# Patient Record
Sex: MALE | Race: WHITE | NOT HISPANIC OR LATINO | Employment: OTHER | URBAN - METROPOLITAN AREA
[De-identification: names, ages, dates, MRNs, and addresses within clinical notes are randomized per-mention and may not be internally consistent; named-entity substitution may affect disease eponyms.]

---

## 2017-02-16 ENCOUNTER — OFFICE VISIT - RIVER FALLS (OUTPATIENT)
Dept: FAMILY MEDICINE | Facility: CLINIC | Age: 72
End: 2017-02-16
Payer: MEDICARE

## 2017-02-16 ASSESSMENT — MIFFLIN-ST. JEOR: SCORE: 1486.96

## 2017-02-23 ENCOUNTER — OFFICE VISIT (OUTPATIENT)
Dept: DERMATOLOGY | Facility: CLINIC | Age: 72
End: 2017-02-23

## 2017-02-23 VITALS — HEART RATE: 63 BPM | DIASTOLIC BLOOD PRESSURE: 89 MMHG | SYSTOLIC BLOOD PRESSURE: 142 MMHG

## 2017-02-23 DIAGNOSIS — L11.1 GROVER'S DISEASE: ICD-10-CM

## 2017-02-23 DIAGNOSIS — L08.9 BACTERIAL INFECTION OF SKIN: Primary | ICD-10-CM

## 2017-02-23 DIAGNOSIS — D48.5 NEOPLASM OF UNCERTAIN BEHAVIOR OF SKIN: ICD-10-CM

## 2017-02-23 DIAGNOSIS — B96.89 BACTERIAL INFECTION OF SKIN: Primary | ICD-10-CM

## 2017-02-23 DIAGNOSIS — R21 RASH: ICD-10-CM

## 2017-02-23 PROCEDURE — 87070 CULTURE OTHR SPECIMN AEROBIC: CPT | Performed by: DERMATOLOGY

## 2017-02-23 PROCEDURE — 87077 CULTURE AEROBIC IDENTIFY: CPT | Performed by: DERMATOLOGY

## 2017-02-23 PROCEDURE — 88305 TISSUE EXAM BY PATHOLOGIST: CPT | Performed by: DERMATOLOGY

## 2017-02-23 ASSESSMENT — PAIN SCALES - GENERAL
PAINLEVEL: NO PAIN (0)
PAINLEVEL: NO PAIN (0)

## 2017-02-23 NOTE — LETTER
"2/23/2017       RE: Gaurang Saeed  [address] [place] [id number]     Dear Colleague,    Thank you for referring your patient, Gaurang Saeed, to the [facility] DERMATOLOGY at [facility]. Please see a copy of my visit note below.                [facility] Dermatology Note    Dermatology Problem List:  1. Erythematous plaques on bilateral malar cheeks, with overlying yellow crust present on L cheek. Differential favors contact dermatitis with possible secondary bacterial infection vs photosensitive drug reaction from HCTZ and recent travel to [place] vs. Granuloma faciale   -aerobic bacterial culture perform on L cheek  -currently treatment: TAC 0.1% ointment daily  -previous treatment: prednisone 50 mg daily for 3 days    2. Violaceous macules and papules with some containing an overlying scale present on abdomen and lower back. Differential Freddy's disease vs. Guttate psoriasis vs other  -s/p punch bx (2/23/17) on R lower back    3. BCC R upper lip  -s/p Mohs 2013    4. Actinic keratoses- face and ears  -s/p cryotherapy   -bx 5/20/16 of R antitragus consistent with an AK    Encounter Date: Feb 23, 2017    CC:  Chief Complaint   Patient presents with     Derm Problem     Patient comes to clinic today for a skin exam. States concern with some \"things\" on his face.     History of Present Illness:  Mr. Gaurang Saeed is a 71 year old male who presents as a follow-up for a regular skin check. The patient was last seen by Dr. Lopez 5/20/16 when a biopsy was perform of his R antitragus that was consistent with an AK. At his last visit, multiple AKs on the face were treated with cryotherapy.  Since his last visit, Mr. Saeed states that he had a flu shot in late December, and shortly after receiving the vaccination, he developed a rash all over his abdomen and back. The rash was initially pruritic, but symptoms have improved over time.  He has not treated the rash, but " states that the rash has not resolved over the past 2 months.      Two weeks ago, Mr. Saeed developed red bumps on both of his cheeks.  The lesions are very pruritic, and one night he was scratching the one on his L cheek which then started oozing serous fluid.  Last week, he was seen in  urgent care for the lesions of his cheek, and was placed on prednisone 50 mg for 5 days. Ms. Saeed stopped taking the medication after 3 days due to poor sleep while on the medication, but does note improvement of the red bumps while taking prednisone.  Within the last month, Mr. Saeed was in [place] visiting one of his children, but denies any changes in products used on face or any changes in medication could have precipitated this rash.  He denies any changes in medication or past medical history within the past 6 months.      Past Medical History:   Patient Active Problem List   Diagnosis     Neoplasm of uncertain behavior of skin     BCC (basal cell carcinoma), face     Bacterial infection of skin     Past Medical History   Diagnosis Date     Basal cell carcinoma      Squamous cell carcinoma (H)      Past Surgical History   Procedure Laterality Date     No history of surgery       Mohs micrographic procedure       Social History:  The patient is a retired .  He lives in [place].     Family History:  There is no family history of melanoma.    Medications:  Current Outpatient Prescriptions   Medication Sig Dispense Refill     lisinopril (PRINIVIL,ZESTRIL) 20 MG tablet Take 20 mg by mouth daily.       hydrochlorothiazide 12.5 MG TABS Take 25 mg by mouth daily.       No Known Allergies    Review of Systems:  -Constitutional: The patient denies fatigue, fevers, chills, unintended weight loss, and night sweats.  -HEENT: Patient denies nonhealing oral sores.  -Skin: As above in HPI. No additional skin concerns.    Physical exam:  Vitals: /89 (BP Location: Right arm, Patient Position: Chair, Cuff Size: Adult Regular)   Pulse 63  GEN: This is a well developed, well-nourished male in no acute distress, in a pleasant mood.    SKIN: Total skin excluding the undergarment areas was performed. The exam included the head/face, neck, both arms, chest, back, abdomen, both legs, digits and/or nails.   -erythematous plaques on bilateral malar cheeks with overlying yellow crust present on L cheek  -Violaceous macules and papules with some containing an overlying scale present on abdomen and lower back  -erythematous gritty papules present along bilateral forehead and temples.    -No other lesions of concern on areas examined.     Impression/Plan:  1. Pruritic, erythematous plaques on bilateral malar cheeks with overlying yellow crust present on L cheek. Differential diagnosis difficult to determine given partially treated with prednisone but includes: Allergic contact dermatitis with a secondary bacterial infection vs. Photosensitive drug reaction (Ms. Saeed takes HCTZ for HTN and was recently in [place]) vs. Granuloma faciale.     Apply TAC 0.1% cream to cheeks daily for a couple of weeks    Aerobic bacterial culture obtained from L cheek    Photographs taken for future reference.    2. Violaceous macules and papules with some containing an overlying scale present on abdomen and lower back. Differential Freddy's disease vs. Guttate psoriasis vs other    Punch biopsy(performed by faculty with medical student assistance): After discussion of benefits and risks including but not limited to bleeding, infection, scar, incomplete removal, recurrence, and non-diagnostic biopsy, written consent and photographs were obtained. Time-out was performed. The area was cleaned with isopropyl alcohol. 2 mL of 1% lidocaine with epinephrine was injected to obtain adequate anesthesia of the lesion on the R lower back. A 4 mm punch biopsy was performed.  4-0 prolene sutures were utilized to approximate the epidermal edges.  White petroleum jelly/VaselineTM and a  bandage was applied to the wound.  Explicit verbal and written wound care instructions were provided.  The patient left the Dermatology Clinic in good condition.     Photographs taken for future reference    3. Actinic keratoses on bilateral forehead and temples. Will not treat today given erythematous plaques on bilateral cheeks. Plan to re-evaluate at his next visit.    4. History of BCC on R upper lip, no evidence of recurrence present on examination today.    Sun precaution was advised including the use of sun screens of SPF 30 or higher, sun protective clothing, and avoidance of tanning beds.      Follow-up in 2 weeks, earlier for new or changing lesions.     Staff Involved:  Scriewbed by Evelia Eugene, MS4 for Dr. Cortez.      I agree with the PFSH and ROS as completed by the Medical Student. The remainder of the encounter was performed by me and scribed by the Medical Student. The scribed note accurately reflects my personal services and the medical decisions made by me. Skin biopsy procedure was done together as noted above.    Mariza Cortez MD  Professor and Chair  Department of Dermatology  [facility]

## 2017-02-23 NOTE — PATIENT INSTRUCTIONS
Wound Care After a Biopsy    What is a skin biopsy?  A skin biopsy allows the doctor to examine a very small piece of tissue under the microscope to determine the diagnosis and the best treatment for the skin condition. A local anesthetic (numbing medicine)  is injected with a very small needle into the skin area to be tested. A small piece of skin is taken from the area. Sometimes a suture (stitch) is used.     What are the risks of a skin biopsy?  I will experience scar, bleeding, swelling, pain, crusting and redness. I may experience incomplete removal or recurrence. Risks of this procedure are excessive bleeding, bruising, infection, nerve damage, numbness, thick (hypertrophic or keloidal) scar and non-diagnostic biopsy.    How should I care for my wound for the first 24 hours?    Keep the wound dry and covered for 24 hours    If it bleeds, hold direct pressure on the area for 15 minutes. If bleeding does not stop then go to the emergency room    Avoid strenuous exercise the first 1-2 days or as your doctor instructs you    How should I care for the wound after 24 hours?    After 24 hours, remove the bandage    You may bathe or shower as normal    If you had a scalp biopsy, you can shampoo as usual and can use shower water to clean the biopsy site daily    Clean the wound twice a day with gentle soap and water    Do not scrub, be gentle    Apply white petroleum/Vaseline after cleaning the wound with a cotton swab or a clean finger, and keep the site covered with a Bandaid /bandage. Bandages are not necessary with a scalp biopsy    If you are unable to cover the site with a Bandaid /bandage, re-apply ointment 2-3 times a day to keep the site moist. Moisture will help with healing    Avoid strenuous activity for first 1-2 days    Avoid lakes, rivers, pools, and oceans until the stitches are removed or the site is healed    How do I clean my wound?    Wash hands for 15 minutes with soap or use hand  before  all wound care    Clean the wound with gentle soap and water    Apply white petroleum/Vaseline  to wound after it is clean    Replace the Bandaid /bandage to keep the wound covered for the first few days or as instructed by your doctor    If you had a scalp biopsy, warm shower water to the area on a daily basis should suffice    What should I use to clean my wound?     Cotton-tipped applicators (Qtips )    White petroleum jelly (Vaseline ). Use a clean new container and use Q-tips to apply.    Bandaids   as needed    Gentle soap     How should I care for my wound long term?    Do not get your wound dirty    Keep up with wound care for one week or until the area is healed.    A small scab will form and fall off by itself when the area is completely healed. The area will be red and will become pink in color as it heals. Sun protection is very important for how your scar will turn out. Sunscreen with an SPF 30 or greater is recommended once the area is healed.    If you have stitches, stitches need to be removed in 14 days. You may return to our clinic for this or you may have it done locally at your doctor s office.    You should have some soreness but it should be mild and slowly go away over several days. Talk to your doctor about using tylenol for pain,    When should I call my doctor?  If you have increased:     Pain or swelling    Pus or drainage (clear or slightly yellow drainage is ok)    Temperature over 100F    Spreading redness or warmth around wound    When will I hear about my results?  The biopsy results can take 2-3 weeks to come back. The clinic will call you with the results, send you a Figgu message, or have you schedule a follow-up clinic or phone time to discuss the results. Contact our clinics if you do not hear from us in 3 weeks.     Who should I call with questions?    [facility]: [phone]     [facility]: [phone]    For  urgent needs outside of business hours call the [facility] at [phone] and ask for the dermatology resident on call

## 2017-02-23 NOTE — MR AVS SNAPSHOT
After Visit Summary   2/23/2017    Gaurang Saeed    MRN: [id number]           Patient Information     Date Of Birth          1945        Visit Information        Provider Department      2/23/2017 3:15 PM Mariza Cortez MD [facility] Dermatology        Today's Diagnoses     Bacterial infection of skin    -  1    Rash          Care Instructions    Wound Care After a Biopsy    What is a skin biopsy?  A skin biopsy allows the doctor to examine a very small piece of tissue under the microscope to determine the diagnosis and the best treatment for the skin condition. A local anesthetic (numbing medicine)  is injected with a very small needle into the skin area to be tested. A small piece of skin is taken from the area. Sometimes a suture (stitch) is used.     What are the risks of a skin biopsy?  I will experience scar, bleeding, swelling, pain, crusting and redness. I may experience incomplete removal or recurrence. Risks of this procedure are excessive bleeding, bruising, infection, nerve damage, numbness, thick (hypertrophic or keloidal) scar and non-diagnostic biopsy.    How should I care for my wound for the first 24 hours?    Keep the wound dry and covered for 24 hours    If it bleeds, hold direct pressure on the area for 15 minutes. If bleeding does not stop then go to the emergency room    Avoid strenuous exercise the first 1-2 days or as your doctor instructs you    How should I care for the wound after 24 hours?    After 24 hours, remove the bandage    You may bathe or shower as normal    If you had a scalp biopsy, you can shampoo as usual and can use shower water to clean the biopsy site daily    Clean the wound twice a day with gentle soap and water    Do not scrub, be gentle    Apply white petroleum/Vaseline after cleaning the wound with a cotton swab or a clean finger, and keep the site covered with a Bandaid /bandage. Bandages are not necessary with a scalp biopsy    If you are  unable to cover the site with a Bandaid /bandage, re-apply ointment 2-3 times a day to keep the site moist. Moisture will help with healing    Avoid strenuous activity for first 1-2 days    Avoid lakes, rivers, pools, and oceans until the stitches are removed or the site is healed    How do I clean my wound?    Wash hands for 15 minutes with soap or use hand  before all wound care    Clean the wound with gentle soap and water    Apply white petroleum/Vaseline  to wound after it is clean    Replace the Bandaid /bandage to keep the wound covered for the first few days or as instructed by your doctor    If you had a scalp biopsy, warm shower water to the area on a daily basis should suffice    What should I use to clean my wound?     Cotton-tipped applicators (Qtips )    White petroleum jelly (Vaseline ). Use a clean new container and use Q-tips to apply.    Bandaids   as needed    Gentle soap     How should I care for my wound long term?    Do not get your wound dirty    Keep up with wound care for one week or until the area is healed.    A small scab will form and fall off by itself when the area is completely healed. The area will be red and will become pink in color as it heals. Sun protection is very important for how your scar will turn out. Sunscreen with an SPF 30 or greater is recommended once the area is healed.    If you have stitches, stitches need to be removed in 14 days. You may return to our clinic for this or you may have it done locally at your doctor s office.    You should have some soreness but it should be mild and slowly go away over several days. Talk to your doctor about using tylenol for pain,    When should I call my doctor?  If you have increased:     Pain or swelling    Pus or drainage (clear or slightly yellow drainage is ok)    Temperature over 100F    Spreading redness or warmth around wound    When will I hear about my results?  The biopsy results can take 2-3 weeks to come  back. The clinic will call you with the results, send you a [employer]AGORAt message, or have you schedule a follow-up clinic or phone time to discuss the results. Contact our clinics if you do not hear from us in 3 weeks.     Who should I call with questions?    [facility]: [phone]     [facility]: [phone]    For urgent needs outside of business hours call the [facility] at [phone] and ask for the dermatology resident on call          Follow-ups after your visit        Your next 10 appointments already scheduled     Mar 07, 2017  1:30 PM CST   (Arrive by 1:15 PM)   Return Visit with Mariza Cortez MD   [facility] Dermatology ([facility])    [address] [place] [id number] [phone]              Who to contact     Please call your clinic at [phone] to:    Ask questions about your health    Make or cancel appointments    Discuss your medicines    Learn about your test results    Speak to your doctor   If you have compliments or concerns about an experience at your clinic, or if you wish to file a complaint, please contact [facility] Patient Relations at [phone] or email us at [email or web address]         Additional Information About Your Visit        [employer] Information     [employer] is an electronic gateway that provides easy, online access to your medical records. With [employer], you can request a clinic appointment, read your test results, renew a prescription or communicate with your care team.     To sign up for addwisht visit the website at [email or web address]   You will be asked to enter the access code listed below, as well as some personal information. Please follow the directions to create your username and password.     Your access code is: [email or web address]  Expires: 2017  6:30 AM     Your access code will   in 90 days. If you need help or a new code, please contact your [facility] or call [phone] for assistance.        Care EveryWhere ID     This is your Care EveryWhere ID. This could be used by other organizations to access your [facility] medical records  [id number]        Your Vitals Were     Pulse                   63            Blood Pressure from Last 3 Encounters:   02/23/17 142/89   01/15/13 (!) 149/92    Weight from Last 3 Encounters:   No data found for Wt              We Performed the Following     BIOPSY SKIN/SUBQ/MUC MEM, SINGLE LESION     Skin Culture Aerobic Bacterial     Surgical pathology exam        Primary Care Provider Office Phone # Fax #    Eddie De La Torre [phone]       [facility] [address] [facility] [address]        Thank you!     Thank you for choosing [facility] DERMATOLOGY  for your care. Our goal is always to provide you with excellent care. Hearing back from our patients is one way we can continue to improve our services. Please take a few minutes to complete the written survey that you may receive in the mail after your visit with us. Thank you!             Your Updated Medication List - Protect others around you: Learn how to safely use, store and throw away your medicines at www.disposemymeds.org.          This list is accurate as of: 2/23/17  4:25 PM.  Always use your most recent med list.                   Brand Name Dispense Instructions for use    hydrochlorothiazide 12.5 MG Tabs tablet      Take 25 mg by mouth daily.       lisinopril 20 MG tablet    PRINIVIL/ZESTRIL     Take 20 mg by mouth daily.

## 2017-02-24 RX ORDER — TRIAMCINOLONE ACETONIDE 1 MG/G
OINTMENT TOPICAL
Qty: 80 G | Refills: 1 | Status: SHIPPED | OUTPATIENT
Start: 2017-02-24 | End: 2022-06-21

## 2017-02-24 NOTE — PROGRESS NOTES
"[facility] Dermatology Note    Dermatology Problem List:  1. Erythematous plaques on bilateral malar cheeks, with overlying yellow crust present on L cheek. Differential favors contact dermatitis with possible secondary bacterial infection vs photosensitive drug reaction from HCTZ and recent travel to [place] vs. Granuloma faciale   -aerobic bacterial culture perform on L cheek  -currently treatment: TAC 0.1% ointment daily  -previous treatment: prednisone 50 mg daily for 3 days    2. Violaceous macules and papules with some containing an overlying scale present on abdomen and lower back. Differential Sumerco's disease vs. Guttate psoriasis vs other  -s/p punch bx (2/23/17) on R lower back    3. BCC R upper lip  -s/p Mohs 2013    4. Actinic keratoses- face and ears  -s/p cryotherapy   -bx 5/20/16 of R antitragus consistent with an AK    Encounter Date: Feb 23, 2017    CC:  Chief Complaint   Patient presents with     Derm Problem     Patient comes to clinic today for a skin exam. States concern with some \"things\" on his face.     History of Present Illness:  Mr. Gaurang Saeed is a 71 year old male who presents as a follow-up for a regular skin check. The patient was last seen by Dr. Lopez 5/20/16 when a biopsy was perform of his R antitragus that was consistent with an AK. At his last visit, multiple AKs on the face were treated with cryotherapy.  Since his last visit, Mr. Saeed states that he had a flu shot in late December, and shortly after receiving the vaccination, he developed a rash all over his abdomen and back. The rash was initially pruritic, but symptoms have improved over time.  He has not treated the rash, but states that the rash has not resolved over the past 2 months.      Two weeks ago, Mr. Saeed developed red bumps on both of his cheeks.  The lesions are very pruritic, and one night he was scratching the one on his L cheek which then started oozing serous fluid.  Last week, he was seen " in  urgent care for the lesions of his cheek, and was placed on prednisone 50 mg for 5 days. Ms. Saeed stopped taking the medication after 3 days due to poor sleep while on the medication, but does note improvement of the red bumps while taking prednisone.  Within the last month, Mr. Saeed was in [place] visiting one of his children, but denies any changes in products used on face or any changes in medication could have precipitated this rash.  He denies any changes in medication or past medical history within the past 6 months.      Past Medical History:   Patient Active Problem List   Diagnosis     Neoplasm of uncertain behavior of skin     BCC (basal cell carcinoma), face     Bacterial infection of skin     Past Medical History   Diagnosis Date     Basal cell carcinoma      Squamous cell carcinoma (H)      Past Surgical History   Procedure Laterality Date     No history of surgery       Mohs micrographic procedure       Social History:  The patient is a retired .  He lives in [place].     Family History:  There is no family history of melanoma.    Medications:  Current Outpatient Prescriptions   Medication Sig Dispense Refill     lisinopril (PRINIVIL,ZESTRIL) 20 MG tablet Take 20 mg by mouth daily.       hydrochlorothiazide 12.5 MG TABS Take 25 mg by mouth daily.       No Known Allergies    Review of Systems:  -Constitutional: The patient denies fatigue, fevers, chills, unintended weight loss, and night sweats.  -HEENT: Patient denies nonhealing oral sores.  -Skin: As above in HPI. No additional skin concerns.    Physical exam:  Vitals: /89 (BP Location: Right arm, Patient Position: Chair, Cuff Size: Adult Regular)  Pulse 63  GEN: This is a well developed, well-nourished male in no acute distress, in a pleasant mood.    SKIN: Total skin excluding the undergarment areas was performed. The exam included the head/face, neck, both arms, chest, back, abdomen, both legs, digits and/or nails.    -erythematous plaques on bilateral malar cheeks with overlying yellow crust present on L cheek  -Violaceous macules and papules with some containing an overlying scale present on abdomen and lower back  -erythematous gritty papules present along bilateral forehead and temples.    -No other lesions of concern on areas examined.     Impression/Plan:  1. Pruritic, erythematous plaques on bilateral malar cheeks with overlying yellow crust present on L cheek. Differential diagnosis difficult to determine given partially treated with prednisone but includes: Allergic contact dermatitis with a secondary bacterial infection vs. Photosensitive drug reaction (Ms. Saeed takes HCTZ for HTN and was recently in [place]) vs. Granuloma faciale.     Apply TAC 0.1% cream to cheeks daily for a couple of weeks    Aerobic bacterial culture obtained from L cheek    Photographs taken for future reference.    2. Violaceous macules and papules with some containing an overlying scale present on abdomen and lower back. Differential Freddy's disease vs. Guttate psoriasis vs other    Punch biopsy(performed by faculty with medical student assistance): After discussion of benefits and risks including but not limited to bleeding, infection, scar, incomplete removal, recurrence, and non-diagnostic biopsy, written consent and photographs were obtained. Time-out was performed. The area was cleaned with isopropyl alcohol. 2 mL of 1% lidocaine with epinephrine was injected to obtain adequate anesthesia of the lesion on the R lower back. A 4 mm punch biopsy was performed.  4-0 prolene sutures were utilized to approximate the epidermal edges.  White petroleum jelly/VaselineTM and a bandage was applied to the wound.  Explicit verbal and written wound care instructions were provided.  The patient left the Dermatology Clinic in good condition.     Photographs taken for future reference    3. Actinic keratoses on bilateral forehead and temples. Will not  treat today given erythematous plaques on bilateral cheeks. Plan to re-evaluate at his next visit.    4. History of BCC on R upper lip, no evidence of recurrence present on examination today.    Sun precaution was advised including the use of sun screens of SPF 30 or higher, sun protective clothing, and avoidance of tanning beds.      Follow-up in 2 weeks, earlier for new or changing lesions.     Staff Involved:  Scriewbed by Evelia Eugene, MS4 for Dr. Cortez.      I agree with the PFSH and ROS as completed by the Medical Student. The remainder of the encounter was performed by me and scribed by the Medical Student. The scribed note accurately reflects my personal services and the medical decisions made by me. Skin biopsy procedure was done together as noted above.    Mariza Cortez MD  Professor and Chair  Department of Dermatology  [facility]      Mariza Cortez MD  Professor and Chair  Department of Dermatology  [facility]

## 2017-02-25 LAB
BACTERIA SPEC CULT: ABNORMAL
MICRO REPORT STATUS: ABNORMAL
SPECIMEN SOURCE: ABNORMAL

## 2017-02-27 LAB — COPATH REPORT: NORMAL

## 2017-03-01 DIAGNOSIS — L11.1 GROVER'S DISEASE: Primary | ICD-10-CM

## 2017-03-01 RX ORDER — TRIAMCINOLONE ACETONIDE 5 MG/G
CREAM TOPICAL 2 TIMES DAILY
Qty: 60 G | Refills: 1 | Status: SHIPPED | OUTPATIENT
Start: 2017-03-01 | End: 2022-06-21

## 2017-03-01 NOTE — PROGRESS NOTES
Biopsy report suggests Hermleigh's - recommend a trial for 3-4 weeks with 0.1% TMC cream and a follow-up visit after this.

## 2017-03-14 ENCOUNTER — TELEPHONE (OUTPATIENT)
Dept: DERMATOLOGY | Facility: CLINIC | Age: 72
End: 2017-03-14

## 2017-03-14 NOTE — TELEPHONE ENCOUNTER
Called pt regarding his June appointment. Pt states he has chosen a different provider to handle his care.

## 2017-03-14 NOTE — TELEPHONE ENCOUNTER
----- Message from Mariza Cortez MD sent at 3/12/2017  9:44 AM CDT -----  Regarding: needs to be seen  - not sure what is going on with his appointments or the letter that was sent to him  Very confusing notes in his chart.     He needs to be seen if at all possible  - not sure what happened to the appointment he had scheduled and why the one in his chart is way out in June.     Emma, please call him and ask him about his facial rash and how it is doing. He should not be using the strong topical steroid we prescribed for this long period of time.     Please connect with me in clinic Monday on this situation.    Regards,  Mariza Cortez MD

## 2017-04-04 ENCOUNTER — OFFICE VISIT - RIVER FALLS (OUTPATIENT)
Dept: FAMILY MEDICINE | Facility: CLINIC | Age: 72
End: 2017-04-04
Payer: MEDICARE

## 2017-04-06 ENCOUNTER — OFFICE VISIT - RIVER FALLS (OUTPATIENT)
Dept: FAMILY MEDICINE | Facility: CLINIC | Age: 72
End: 2017-04-06
Payer: MEDICARE

## 2017-04-06 ASSESSMENT — MIFFLIN-ST. JEOR: SCORE: 1468.82

## 2018-04-24 ENCOUNTER — AMBULATORY - RIVER FALLS (OUTPATIENT)
Dept: FAMILY MEDICINE | Facility: CLINIC | Age: 73
End: 2018-04-24
Payer: MEDICARE

## 2018-04-25 LAB
CHOLEST SERPL-MCNC: 220 MG/DL
CHOLEST/HDLC SERPL: 2.5 {RATIO}
CREAT SERPL-MCNC: 1.04 MG/DL (ref 0.7–1.18)
GLUCOSE BLD-MCNC: 93 MG/DL (ref 65–99)
HDLC SERPL-MCNC: 88 MG/DL
LDLC SERPL CALC-MCNC: 116 MG/DL
NONHDLC SERPL-MCNC: 132 MG/DL
PSA SERPL-MCNC: 0.8 NG/ML
TRIGL SERPL-MCNC: 64 MG/DL

## 2018-04-26 ENCOUNTER — OFFICE VISIT - RIVER FALLS (OUTPATIENT)
Dept: FAMILY MEDICINE | Facility: CLINIC | Age: 73
End: 2018-04-26
Payer: MEDICARE

## 2018-04-26 ASSESSMENT — MIFFLIN-ST. JEOR: SCORE: 1459.74

## 2019-04-22 ENCOUNTER — AMBULATORY - RIVER FALLS (OUTPATIENT)
Dept: FAMILY MEDICINE | Facility: CLINIC | Age: 74
End: 2019-04-22
Payer: MEDICARE

## 2019-04-23 LAB
BUN SERPL-MCNC: 16 MG/DL (ref 7–25)
BUN/CREAT RATIO - HISTORICAL: NORMAL (ref 6–22)
CALCIUM SERPL-MCNC: 9.2 MG/DL (ref 8.6–10.3)
CHLORIDE BLD-SCNC: 105 MMOL/L (ref 98–110)
CHOLEST SERPL-MCNC: 203 MG/DL
CHOLEST/HDLC SERPL: 2.4 {RATIO}
CO2 SERPL-SCNC: 30 MMOL/L (ref 20–32)
CREAT SERPL-MCNC: 0.9 MG/DL (ref 0.7–1.18)
EGFRCR SERPLBLD CKD-EPI 2021: 84 ML/MIN/1.73M2
GLUCOSE BLD-MCNC: 91 MG/DL (ref 65–99)
HDLC SERPL-MCNC: 86 MG/DL
LDLC SERPL CALC-MCNC: 96 MG/DL
NONHDLC SERPL-MCNC: 117 MG/DL
POTASSIUM BLD-SCNC: 4.3 MMOL/L (ref 3.5–5.3)
PSA SERPL-MCNC: 0.9 NG/ML
SODIUM SERPL-SCNC: 141 MMOL/L (ref 135–146)
TRIGL SERPL-MCNC: 110 MG/DL

## 2019-04-29 ENCOUNTER — OFFICE VISIT - RIVER FALLS (OUTPATIENT)
Dept: FAMILY MEDICINE | Facility: CLINIC | Age: 74
End: 2019-04-29
Payer: MEDICARE

## 2019-04-29 ASSESSMENT — MIFFLIN-ST. JEOR: SCORE: 1441.6

## 2019-04-30 ENCOUNTER — COMMUNICATION - RIVER FALLS (OUTPATIENT)
Dept: FAMILY MEDICINE | Facility: CLINIC | Age: 74
End: 2019-04-30
Payer: MEDICARE

## 2019-04-30 ENCOUNTER — AMBULATORY - RIVER FALLS (OUTPATIENT)
Dept: FAMILY MEDICINE | Facility: CLINIC | Age: 74
End: 2019-04-30
Payer: MEDICARE

## 2019-04-30 LAB
BUN SERPL-MCNC: 21 MG/DL (ref 7–25)
BUN/CREAT RATIO - HISTORICAL: NORMAL (ref 6–22)
CALCIUM SERPL-MCNC: 9.3 MG/DL (ref 8.6–10.3)
CHLORIDE BLD-SCNC: 101 MMOL/L (ref 98–110)
CO2 SERPL-SCNC: 28 MMOL/L (ref 20–32)
CREAT SERPL-MCNC: 0.98 MG/DL (ref 0.7–1.18)
EGFRCR SERPLBLD CKD-EPI 2021: 76 ML/MIN/1.73M2
GLUCOSE BLD-MCNC: 85 MG/DL (ref 65–99)
HGB BLD-MCNC: 14.1 GM/DL (ref 13.2–17.1)
POTASSIUM BLD-SCNC: 3.9 MMOL/L (ref 3.5–5.3)
SODIUM SERPL-SCNC: 137 MMOL/L (ref 135–146)
TSH SERPL DL<=0.005 MIU/L-ACNC: 2.38 MIU/L (ref 0.4–4.5)

## 2019-05-02 ENCOUNTER — OFFICE VISIT - RIVER FALLS (OUTPATIENT)
Dept: FAMILY MEDICINE | Facility: CLINIC | Age: 74
End: 2019-05-02
Payer: MEDICARE

## 2019-05-07 ENCOUNTER — COMMUNICATION - RIVER FALLS (OUTPATIENT)
Dept: FAMILY MEDICINE | Facility: CLINIC | Age: 74
End: 2019-05-07
Payer: MEDICARE

## 2019-05-19 ENCOUNTER — COMMUNICATION - RIVER FALLS (OUTPATIENT)
Dept: FAMILY MEDICINE | Facility: CLINIC | Age: 74
End: 2019-05-19
Payer: MEDICARE

## 2019-05-28 ENCOUNTER — OFFICE VISIT - RIVER FALLS (OUTPATIENT)
Dept: FAMILY MEDICINE | Facility: CLINIC | Age: 74
End: 2019-05-28
Payer: MEDICARE

## 2019-05-29 LAB — B BURGDOR IGG+IGM SER QL: <0.9

## 2019-05-30 ENCOUNTER — COMMUNICATION - RIVER FALLS (OUTPATIENT)
Dept: FAMILY MEDICINE | Facility: CLINIC | Age: 74
End: 2019-05-30
Payer: MEDICARE

## 2019-09-11 ENCOUNTER — OFFICE VISIT - RIVER FALLS (OUTPATIENT)
Dept: FAMILY MEDICINE | Facility: CLINIC | Age: 74
End: 2019-09-11
Payer: MEDICARE

## 2019-09-30 ENCOUNTER — HEALTH MAINTENANCE LETTER (OUTPATIENT)
Age: 74
End: 2019-09-30

## 2019-10-14 ENCOUNTER — COMMUNICATION - RIVER FALLS (OUTPATIENT)
Dept: FAMILY MEDICINE | Facility: CLINIC | Age: 74
End: 2019-10-14
Payer: MEDICARE

## 2019-10-25 ENCOUNTER — OFFICE VISIT - RIVER FALLS (OUTPATIENT)
Dept: FAMILY MEDICINE | Facility: CLINIC | Age: 74
End: 2019-10-25
Payer: MEDICARE

## 2019-10-30 ENCOUNTER — OFFICE VISIT - RIVER FALLS (OUTPATIENT)
Dept: FAMILY MEDICINE | Facility: CLINIC | Age: 74
End: 2019-10-30
Payer: MEDICARE

## 2019-10-30 ASSESSMENT — MIFFLIN-ST. JEOR: SCORE: 1443.41

## 2019-11-18 ENCOUNTER — OFFICE VISIT - RIVER FALLS (OUTPATIENT)
Dept: FAMILY MEDICINE | Facility: CLINIC | Age: 74
End: 2019-11-18
Payer: MEDICARE

## 2019-11-18 ENCOUNTER — COMMUNICATION - RIVER FALLS (OUTPATIENT)
Dept: FAMILY MEDICINE | Facility: CLINIC | Age: 74
End: 2019-11-18
Payer: MEDICARE

## 2019-11-18 LAB
FLUAV AG SPEC QL IA: NEGATIVE
FLUBV AG SPEC QL IA: NEGATIVE

## 2019-11-18 ASSESSMENT — MIFFLIN-ST. JEOR: SCORE: 1432.53

## 2019-11-19 ENCOUNTER — COMMUNICATION - RIVER FALLS (OUTPATIENT)
Dept: FAMILY MEDICINE | Facility: CLINIC | Age: 74
End: 2019-11-19
Payer: MEDICARE

## 2019-11-19 LAB — B BURGDOR IGG+IGM SER QL: <0.9

## 2019-12-12 ENCOUNTER — OFFICE VISIT - RIVER FALLS (OUTPATIENT)
Dept: FAMILY MEDICINE | Facility: CLINIC | Age: 74
End: 2019-12-12
Payer: MEDICARE

## 2019-12-12 ASSESSMENT — MIFFLIN-ST. JEOR: SCORE: 1410.02

## 2019-12-13 ENCOUNTER — COMMUNICATION - RIVER FALLS (OUTPATIENT)
Dept: FAMILY MEDICINE | Facility: CLINIC | Age: 74
End: 2019-12-13
Payer: MEDICARE

## 2019-12-15 LAB
ANAPLASMA PHAGOCYTOPHILUM - HISTORICAL: NORMAL
ANAPLASMA PHAGOCYTOPHILUM - HISTORICAL: NORMAL
ANAPLASMA PHAGOCYTOPHILUM - HISTORICAL: NOT DETECTED
ANAPLASMA PHAGOCYTOPHILUM AB, IGG, S: NORMAL

## 2019-12-17 ENCOUNTER — COMMUNICATION - RIVER FALLS (OUTPATIENT)
Dept: FAMILY MEDICINE | Facility: CLINIC | Age: 74
End: 2019-12-17
Payer: MEDICARE

## 2019-12-18 ENCOUNTER — AMBULATORY - RIVER FALLS (OUTPATIENT)
Dept: FAMILY MEDICINE | Facility: CLINIC | Age: 74
End: 2019-12-18
Payer: MEDICARE

## 2019-12-19 LAB
BASOPHILS # BLD MANUAL: 10 10*3/UL (ref 0–200)
BASOPHILS NFR BLD MANUAL: 0.3 %
EOSINOPHIL # BLD MANUAL: 92 10*3/UL (ref 15–500)
EOSINOPHIL NFR BLD MANUAL: 2.8 %
ERYTHROCYTE [DISTWIDTH] IN BLOOD BY AUTOMATED COUNT: 13.2 % (ref 11–15)
HCT VFR BLD AUTO: 32.9 % (ref 38.5–50)
HGB BLD-MCNC: 11.4 GM/DL (ref 13.2–17.1)
LYMPHOCYTES # BLD MANUAL: 1066 10*3/UL (ref 850–3900)
LYMPHOCYTES NFR BLD MANUAL: 32.3 %
MCH RBC QN AUTO: 30.3 PG (ref 27–33)
MCHC RBC AUTO-ENTMCNC: 34.7 GM/DL (ref 32–36)
MCV RBC AUTO: 87.5 FL (ref 80–100)
MONOCYTES # BLD MANUAL: 518 10*3/UL (ref 200–950)
MONOCYTES NFR BLD MANUAL: 15.7 %
NEUTROPHILS # BLD MANUAL: 1614 10*3/UL (ref 1500–7800)
NEUTROPHILS NFR BLD MANUAL: 48.9 %
PLATELET # BLD AUTO: 189 10*3/UL (ref 140–400)
PMV BLD: 11.2 FL (ref 7.5–12.5)
POTASSIUM BLD-SCNC: 4.7 MMOL/L (ref 3.5–5.3)
RBC # BLD AUTO: 3.76 10*6/UL (ref 4.2–5.8)
WBC # BLD AUTO: 3.3 10*3/UL (ref 3.8–10.8)

## 2019-12-31 ENCOUNTER — COMMUNICATION - RIVER FALLS (OUTPATIENT)
Dept: FAMILY MEDICINE | Facility: CLINIC | Age: 74
End: 2019-12-31
Payer: MEDICARE

## 2020-01-06 ENCOUNTER — OFFICE VISIT - RIVER FALLS (OUTPATIENT)
Dept: FAMILY MEDICINE | Facility: CLINIC | Age: 75
End: 2020-01-06
Payer: MEDICARE

## 2020-01-06 ASSESSMENT — MIFFLIN-ST. JEOR: SCORE: 1414.38

## 2020-01-07 ENCOUNTER — COMMUNICATION - RIVER FALLS (OUTPATIENT)
Dept: FAMILY MEDICINE | Facility: CLINIC | Age: 75
End: 2020-01-07
Payer: MEDICARE

## 2020-01-07 LAB
BASOPHILS # BLD MANUAL: 11 10*3/UL (ref 0–200)
BASOPHILS NFR BLD MANUAL: 0.3 %
EOSINOPHIL # BLD MANUAL: 158 10*3/UL (ref 15–500)
EOSINOPHIL NFR BLD MANUAL: 4.5 %
ERYTHROCYTE [DISTWIDTH] IN BLOOD BY AUTOMATED COUNT: 17.1 % (ref 11–15)
HCT VFR BLD AUTO: 34.3 % (ref 38.5–50)
HGB BLD-MCNC: 11.9 GM/DL (ref 13.2–17.1)
LYMPHOCYTES # BLD MANUAL: 665 10*3/UL (ref 850–3900)
LYMPHOCYTES NFR BLD MANUAL: 19 %
MCH RBC QN AUTO: 31.1 PG (ref 27–33)
MCHC RBC AUTO-ENTMCNC: 34.7 GM/DL (ref 32–36)
MCV RBC AUTO: 89.6 FL (ref 80–100)
MONOCYTES # BLD MANUAL: 368 10*3/UL (ref 200–950)
MONOCYTES NFR BLD MANUAL: 10.5 %
NEUTROPHILS # BLD MANUAL: 2300 10*3/UL (ref 1500–7800)
NEUTROPHILS NFR BLD MANUAL: 65.7 %
PLATELET # BLD AUTO: 223 10*3/UL (ref 140–400)
PMV BLD: 9.3 FL (ref 7.5–12.5)
RBC # BLD AUTO: 3.83 10*6/UL (ref 4.2–5.8)
WBC # BLD AUTO: 3.5 10*3/UL (ref 3.8–10.8)

## 2020-01-09 ENCOUNTER — AMBULATORY - RIVER FALLS (OUTPATIENT)
Dept: FAMILY MEDICINE | Facility: CLINIC | Age: 75
End: 2020-01-09
Payer: MEDICARE

## 2020-01-09 ENCOUNTER — COMMUNICATION - RIVER FALLS (OUTPATIENT)
Dept: FAMILY MEDICINE | Facility: CLINIC | Age: 75
End: 2020-01-09
Payer: MEDICARE

## 2020-01-13 ENCOUNTER — COMMUNICATION - RIVER FALLS (OUTPATIENT)
Dept: FAMILY MEDICINE | Facility: CLINIC | Age: 75
End: 2020-01-13
Payer: MEDICARE

## 2020-01-16 ENCOUNTER — AMBULATORY - RIVER FALLS (OUTPATIENT)
Dept: FAMILY MEDICINE | Facility: CLINIC | Age: 75
End: 2020-01-16
Payer: MEDICARE

## 2020-01-17 ENCOUNTER — COMMUNICATION - RIVER FALLS (OUTPATIENT)
Dept: FAMILY MEDICINE | Facility: CLINIC | Age: 75
End: 2020-01-17
Payer: MEDICARE

## 2020-01-17 LAB
BASOPHILS # BLD MANUAL: 21 10*3/UL (ref 0–200)
BASOPHILS NFR BLD MANUAL: 0.5 %
EOSINOPHIL # BLD MANUAL: 109 10*3/UL (ref 15–500)
EOSINOPHIL NFR BLD MANUAL: 2.6 %
ERYTHROCYTE [DISTWIDTH] IN BLOOD BY AUTOMATED COUNT: 16.8 % (ref 11–15)
HCT VFR BLD AUTO: 35.4 % (ref 38.5–50)
HGB BLD-MCNC: 12.6 GM/DL (ref 13.2–17.1)
LYMPHOCYTES # BLD MANUAL: 865 10*3/UL (ref 850–3900)
LYMPHOCYTES NFR BLD MANUAL: 20.6 %
MCH RBC QN AUTO: 32.1 PG (ref 27–33)
MCHC RBC AUTO-ENTMCNC: 35.6 GM/DL (ref 32–36)
MCV RBC AUTO: 90.3 FL (ref 80–100)
MONOCYTES # BLD MANUAL: 462 10*3/UL (ref 200–950)
MONOCYTES NFR BLD MANUAL: 11 %
NEUTROPHILS # BLD MANUAL: 2743 10*3/UL (ref 1500–7800)
NEUTROPHILS NFR BLD MANUAL: 65.3 %
PLATELET # BLD AUTO: 349 10*3/UL (ref 140–400)
PMV BLD: 9.4 FL (ref 7.5–12.5)
RBC # BLD AUTO: 3.92 10*6/UL (ref 4.2–5.8)
WBC # BLD AUTO: 4.2 10*3/UL (ref 3.8–10.8)

## 2020-03-15 ENCOUNTER — HEALTH MAINTENANCE LETTER (OUTPATIENT)
Age: 75
End: 2020-03-15

## 2021-01-15 ENCOUNTER — HEALTH MAINTENANCE LETTER (OUTPATIENT)
Age: 76
End: 2021-01-15

## 2021-03-17 ENCOUNTER — AMBULATORY - RIVER FALLS (OUTPATIENT)
Dept: FAMILY MEDICINE | Facility: CLINIC | Age: 76
End: 2021-03-17
Payer: MEDICARE

## 2021-04-14 ENCOUNTER — AMBULATORY - RIVER FALLS (OUTPATIENT)
Dept: FAMILY MEDICINE | Facility: CLINIC | Age: 76
End: 2021-04-14
Payer: MEDICARE

## 2021-05-07 ENCOUNTER — COMMUNICATION - RIVER FALLS (OUTPATIENT)
Dept: FAMILY MEDICINE | Facility: CLINIC | Age: 76
End: 2021-05-07
Payer: MEDICARE

## 2021-05-09 ENCOUNTER — HEALTH MAINTENANCE LETTER (OUTPATIENT)
Age: 76
End: 2021-05-09

## 2021-05-12 ENCOUNTER — OFFICE VISIT - RIVER FALLS (OUTPATIENT)
Dept: FAMILY MEDICINE | Facility: CLINIC | Age: 76
End: 2021-05-12
Payer: MEDICARE

## 2021-05-14 ENCOUNTER — TRANSFERRED RECORDS (OUTPATIENT)
Dept: HEALTH INFORMATION MANAGEMENT | Facility: CLINIC | Age: 76
End: 2021-05-14

## 2021-06-15 ENCOUNTER — TRANSFERRED RECORDS (OUTPATIENT)
Dept: MULTI SPECIALTY CLINIC | Facility: CLINIC | Age: 76
End: 2021-06-15
Payer: MEDICARE

## 2021-06-15 ENCOUNTER — OFFICE VISIT - RIVER FALLS (OUTPATIENT)
Dept: FAMILY MEDICINE | Facility: CLINIC | Age: 76
End: 2021-06-15
Payer: MEDICARE

## 2021-06-15 ASSESSMENT — MIFFLIN-ST. JEOR: SCORE: 1412.57

## 2021-06-16 ENCOUNTER — COMMUNICATION - RIVER FALLS (OUTPATIENT)
Dept: FAMILY MEDICINE | Facility: CLINIC | Age: 76
End: 2021-06-16
Payer: MEDICARE

## 2021-06-16 LAB
BUN SERPL-MCNC: 20 MG/DL (ref 7–25)
BUN/CREAT RATIO - HISTORICAL: ABNORMAL (ref 6–22)
CALCIUM SERPL-MCNC: 9.7 MG/DL (ref 8.6–10.3)
CHLORIDE BLD-SCNC: 104 MMOL/L (ref 98–110)
CHOLEST SERPL-MCNC: 222 MG/DL
CHOLEST/HDLC SERPL: 2.7 {RATIO}
CO2 SERPL-SCNC: 28 MMOL/L (ref 20–32)
CREAT SERPL-MCNC: 1.09 MG/DL (ref 0.7–1.18)
EGFRCR SERPLBLD CKD-EPI 2021: 66 ML/MIN/1.73M2
GLUCOSE BLD-MCNC: 129 MG/DL (ref 65–99)
HDLC SERPL-MCNC: 83 MG/DL
LDLC SERPL CALC-MCNC: 117 MG/DL
NONHDLC SERPL-MCNC: 139 MG/DL
POTASSIUM BLD-SCNC: 4.2 MMOL/L (ref 3.5–5.3)
PSA SERPL-MCNC: 1 NG/ML
SODIUM SERPL-SCNC: 140 MMOL/L (ref 135–146)
TRIGL SERPL-MCNC: 108 MG/DL

## 2021-06-18 ENCOUNTER — COMMUNICATION - RIVER FALLS (OUTPATIENT)
Dept: FAMILY MEDICINE | Facility: CLINIC | Age: 76
End: 2021-06-18
Payer: MEDICARE

## 2021-07-21 ENCOUNTER — COMMUNICATION - RIVER FALLS (OUTPATIENT)
Dept: FAMILY MEDICINE | Facility: CLINIC | Age: 76
End: 2021-07-21
Payer: MEDICARE

## 2021-10-24 ENCOUNTER — HEALTH MAINTENANCE LETTER (OUTPATIENT)
Age: 76
End: 2021-10-24

## 2021-10-31 ENCOUNTER — NURSE TRIAGE (OUTPATIENT)
Dept: NURSING | Facility: CLINIC | Age: 76
End: 2021-10-31

## 2021-10-31 NOTE — TELEPHONE ENCOUNTER
Patient is calling to find out what to do, as he had a home covid test turn up positive.  He wanted to know how accurate they were and how he can be re tested in a clinic. I explained that an e visit on my chart is the easiest way to get an order for one.  He then informed me he was outside of the urgent care and was just going to go in and have the test done there.    Gissell Bruce RN/ [facility]      Reason for Disposition    COVID-19 Testing, questions about    Protocols used: CORONAVIRUS (COVID-19) DIAGNOSED OR [id number] 8.25.2021

## 2021-11-01 ENCOUNTER — COMMUNICATION - RIVER FALLS (OUTPATIENT)
Dept: FAMILY MEDICINE | Facility: CLINIC | Age: 76
End: 2021-11-01
Payer: MEDICARE

## 2021-11-01 ENCOUNTER — OFFICE VISIT - RIVER FALLS (OUTPATIENT)
Dept: FAMILY MEDICINE | Facility: CLINIC | Age: 76
End: 2021-11-01
Payer: MEDICARE

## 2021-12-09 ENCOUNTER — OFFICE VISIT - RIVER FALLS (OUTPATIENT)
Dept: FAMILY MEDICINE | Facility: CLINIC | Age: 76
End: 2021-12-09
Payer: MEDICARE

## 2021-12-09 ASSESSMENT — MIFFLIN-ST. JEOR: SCORE: 1447.95

## 2021-12-16 ENCOUNTER — OFFICE VISIT - RIVER FALLS (OUTPATIENT)
Dept: FAMILY MEDICINE | Facility: CLINIC | Age: 76
End: 2021-12-16
Payer: MEDICARE

## 2021-12-16 ASSESSMENT — MIFFLIN-ST. JEOR: SCORE: 1437.06

## 2022-02-12 VITALS — HEART RATE: 70 BPM | SYSTOLIC BLOOD PRESSURE: 109 MMHG | DIASTOLIC BLOOD PRESSURE: 74 MMHG

## 2022-02-12 VITALS
HEIGHT: 69 IN | SYSTOLIC BLOOD PRESSURE: 118 MMHG | BODY MASS INDEX: 24.14 KG/M2 | DIASTOLIC BLOOD PRESSURE: 74 MMHG | HEART RATE: 66 BPM | WEIGHT: 163 LBS

## 2022-02-12 VITALS
BODY MASS INDEX: 23.6 KG/M2 | RESPIRATION RATE: 16 BRPM | HEART RATE: 71 BPM | HEART RATE: 64 BPM | BODY MASS INDEX: 23.6 KG/M2 | WEIGHT: 159.8 LBS | HEIGHT: 69 IN | TEMPERATURE: 97.8 F | DIASTOLIC BLOOD PRESSURE: 95 MMHG | TEMPERATURE: 97.6 F | WEIGHT: 157 LBS | OXYGEN SATURATION: 97 % | TEMPERATURE: 97.2 F | SYSTOLIC BLOOD PRESSURE: 168 MMHG | HEIGHT: 69 IN | TEMPERATURE: 97.9 F | DIASTOLIC BLOOD PRESSURE: 70 MMHG | BODY MASS INDEX: 23.61 KG/M2 | HEART RATE: 61 BPM | DIASTOLIC BLOOD PRESSURE: 82 MMHG | WEIGHT: 159.4 LBS | HEIGHT: 69 IN | BODY MASS INDEX: 23.6 KG/M2 | SYSTOLIC BLOOD PRESSURE: 136 MMHG | DIASTOLIC BLOOD PRESSURE: 82 MMHG | SYSTOLIC BLOOD PRESSURE: 126 MMHG | HEIGHT: 69 IN | BODY MASS INDEX: 23.25 KG/M2 | SYSTOLIC BLOOD PRESSURE: 112 MMHG

## 2022-02-12 VITALS
HEIGHT: 69 IN | DIASTOLIC BLOOD PRESSURE: 77 MMHG | BODY MASS INDEX: 22.6 KG/M2 | SYSTOLIC BLOOD PRESSURE: 135 MMHG | TEMPERATURE: 97.9 F | HEART RATE: 73 BPM | WEIGHT: 152.6 LBS

## 2022-02-12 VITALS
SYSTOLIC BLOOD PRESSURE: 118 MMHG | HEIGHT: 69 IN | WEIGHT: 153 LBS | TEMPERATURE: 97.4 F | HEART RATE: 81 BPM | DIASTOLIC BLOOD PRESSURE: 66 MMHG | WEIGHT: 152.04 LBS | SYSTOLIC BLOOD PRESSURE: 114 MMHG | DIASTOLIC BLOOD PRESSURE: 74 MMHG | BODY MASS INDEX: 22.66 KG/M2 | HEIGHT: 69 IN | HEART RATE: 65 BPM | BODY MASS INDEX: 22.52 KG/M2 | TEMPERATURE: 98.4 F

## 2022-02-12 VITALS
TEMPERATURE: 97.2 F | HEART RATE: 60 BPM | DIASTOLIC BLOOD PRESSURE: 91 MMHG | SYSTOLIC BLOOD PRESSURE: 153 MMHG | BODY MASS INDEX: 23.4 KG/M2 | HEIGHT: 69 IN | TEMPERATURE: 96.4 F | HEART RATE: 66 BPM | DIASTOLIC BLOOD PRESSURE: 90 MMHG | HEIGHT: 69 IN | OXYGEN SATURATION: 98 % | SYSTOLIC BLOOD PRESSURE: 156 MMHG | WEIGHT: 160.4 LBS | BODY MASS INDEX: 23.76 KG/M2 | WEIGHT: 158 LBS

## 2022-02-12 VITALS — BODY MASS INDEX: 22.54 KG/M2 | HEIGHT: 69 IN

## 2022-02-12 VITALS
SYSTOLIC BLOOD PRESSURE: 130 MMHG | HEART RATE: 64 BPM | DIASTOLIC BLOOD PRESSURE: 80 MMHG | WEIGHT: 165 LBS | SYSTOLIC BLOOD PRESSURE: 124 MMHG | DIASTOLIC BLOOD PRESSURE: 86 MMHG | HEIGHT: 69 IN | BODY MASS INDEX: 24.44 KG/M2

## 2022-02-12 VITALS
OXYGEN SATURATION: 98 % | BODY MASS INDEX: 22.98 KG/M2 | HEART RATE: 73 BPM | SYSTOLIC BLOOD PRESSURE: 124 MMHG | WEIGHT: 155.6 LBS | DIASTOLIC BLOOD PRESSURE: 78 MMHG | TEMPERATURE: 97.9 F

## 2022-02-12 VITALS
HEART RATE: 70 BPM | HEIGHT: 69 IN | BODY MASS INDEX: 23.55 KG/M2 | SYSTOLIC BLOOD PRESSURE: 136 MMHG | WEIGHT: 159 LBS | DIASTOLIC BLOOD PRESSURE: 76 MMHG

## 2022-02-12 VITALS
WEIGHT: 169 LBS | TEMPERATURE: 97 F | HEIGHT: 69 IN | SYSTOLIC BLOOD PRESSURE: 146 MMHG | HEART RATE: 66 BPM | BODY MASS INDEX: 25.03 KG/M2 | DIASTOLIC BLOOD PRESSURE: 84 MMHG

## 2022-02-12 VITALS
TEMPERATURE: 97.8 F | SYSTOLIC BLOOD PRESSURE: 159 MMHG | HEART RATE: 62 BPM | BODY MASS INDEX: 23.41 KG/M2 | WEIGHT: 158.5 LBS | DIASTOLIC BLOOD PRESSURE: 95 MMHG

## 2022-02-15 NOTE — PROGRESS NOTES
Chief Complaint    Patient is here today c/o persistent coughing, very little energy, headaches, night sweats, c/o rash on chest and back x 1 week  History of Present Illness      Patient developed a nonproductive cough and fatigue 8 days ago 4 days later noted night sweats and a maculopapular rash on his chest.  Nasal congestion and rhinitis.  Headache and myalgia.  No fever or chills.      He had an engorged tick removed October 30 and subsequently complained fever of fever fatigue chills nausea when he was seen October 18 and had negative Lyme screen and influenza testing.  He did have single dose prophylaxis for Lyme's disease at the time of the tick removal.  Review of Systems      No sputum production or dyspnea.  Appetite poor.  No weight loss.  No sinus pressure purulent drainage.  No severe headache or neurologic symptoms.  No chest pain or dyspnea.  No diarrhea or abdominal pain.  Physical Exam   Vitals & Measurements    T: 98.4   F (Tympanic)  HR: 81(Peripheral)  BP: 118/66     HT: 69 in  WT: 152.04 lb  BMI: 22.45       Patient is a healthy-appearing young man in no distress.  His wife is here with him.  He is alert and oriented.  Eyes are normal.  HEENT exam reveals nontender sinuses, unremarkable oropharynx, no erythema of the TMs.  Neck is supple no nodes or thyromegaly.  No meningeal signs.  Chest is clear to auscultation and percussion.  Heart sounds are regular no murmur gallop.  Abdomen is soft and nontender.  Liver seems perhaps a little bit generous as it is palpable at the costal margin.  Maculopapular rash of the chest and a little bit of the low back.  To cupping lesions of the upper back.  No hot joints.  Neurologic exam is normal.  Assessment/Plan       Anaplasmosis (A77.49)        Patient with recent tick exposure, pancytopenia, and nonspecific illness not atypical for Anaplasma infection.  We will start the patient on doxycycline twice daily and get testing for anaplasmosis.  He will call  if not promptly better otherwise follow-up in 2 weeks.          Ordered:          doxycycline, = 1 cap(s) ( 100 mg ), Oral, bid, x 14 day(s), # 28 cap(s), 0 Refill(s), Type: Acute, Pharmacy: [facility], 1 cap(s) Oral bid,x14 day(s), (Ordered)                Hypertension (I10)         Controlled.         Ordered:          02542 office outpatient visit 25 minutes (Charge), Quantity: 1, Hypertension  Rash of body  Acute respiratory infection          Basic Metabolic Panel (Request), Priority: Urgent, Hypertension  Acute respiratory infection          CBC w/ Diff/Plt (Request), Priority: Urgent, Hypertension  Rash of body          Hepatic Function Panel (Request), Priority: Urgent, Hypertension  Rash of body          XR Chest PA/Lat (Request), Priority: STAT, Hypertension                Hypokalemia (E87.6)         Due to hydrochlorothiazide.  Discussed adding a supplement he would prefer to increase potassium-containing foods and we discussed these foods.                Pancytopenia (D61.818)         Seems to be acute.  Not inconsistent with anaplasmosis infection.  Need to consider acute hematologic disorders and this will need to be repeated in the short-term.  Plan to repeat the CBC in a week.                Rash of body (R21)        Have been reported and anaplasmosis.          Ordered:          [id number] office outpatient visit 25 minutes (Charge), Quantity: 1, Hypertension  Rash of body  Acute respiratory infection          CBC w/ Diff/Plt (Request), Priority: Urgent, Hypertension  Rash of body          Hepatic Function Panel (Request), Priority: Urgent, Hypertension  Rash of body                Orders:         Anaplasma Phagocytophilum Antibodies (IGG,IGM)* ([facility]), Specimen Type: Serum, Collection Date: 12/12/19 16:11:00 CST         Anaplasma Phagocytophilum Dna, Ql Real Time Pcr* ([facility]), Specimen Type: Blood, Collection Date: 12/12/19 16:11:00 CST         Return to Clinic (Request), Return in 2 weeks          Return to Clinic (Request), RFV: BP check with [facility]  Patient Information     Name:CHANA BENITEZ      Address:      [address] [id number]     Sex:Male     YOB: 1945     [phone]     Emergency Contact:KIA GE     MRN:[id number]     FIN:[id number]     Location:[facility]     Date of Service:12/12/2019      Primary Care Physician:       Macario Frances MD, [phone]      Attending Physician:       Macario Frances MD, [phone]  Problem List/Past Medical History    Ongoing     Agatston coronary artery calcium score less than 100       Comments: 62     Dyslipidemia, goal LDL below 130       Comments: 10 year estimated risk 17.5% improved to 14.1% with optimal care 10 year estimated risk 21% 10 year risk 14.8%. Moderate to high intensity statin indicated.     Family history of heart disease       Comments: Father 53     Hearing loss in left ear     Hypertension     Low back pain     Palpitations     Seasonal allergies    Historical     No qualifying data  Procedure/Surgical History     Cardiac computed tomography for calcium scoring (03/25/2014)      Comments: Total Agatston calcium score is 62.     Colonoscopy (08/17/2011)      Comments: Negative. Repeat in 10 years..     Knee Arthrosopy (2007)     Colonoscopy (03/15/1996)      Comments: normal.     Rectal biopsy (03/15/1996)     Appendectomy (1960)  Medications    hydroCHLOROthiazide 12.5 mg oral capsule, 12.5 mg= 1 cap(s), Oral, daily, 3 refills    lisinopril 40 mg oral tablet, 40 mg= 1 tab(s), Oral, daily, 3 refills  Allergies    HYDROcodone (Nausea & vomiting., nausea)    oxyCODONE (Nausea)  Social History    Smoking Status - 12/12/2019     Never smoker     Alcohol - Current, 12/28/2010      Current, Daily, 2 drinks/episode average. 2 drinks/episode maximum., 04/06/2017     Employment/School      Employed, Work/School description: ., 12/28/2010     Exercise - Regular  exercise, 12/28/2010     Home/Environment      Marital status: . Spouse/Partner name: Alona Simons. Living situation: Home/Independent., 04/29/2019     Substance Abuse - Denies Substance Abuse, 12/28/2010     Tobacco - Denies Tobacco Use, 12/28/2010      Never smoker, 04/06/2017  Family History    Hypertension: Mother (Dx at 60).    Hypothyroidism: Mother and Daughter.    MI - Myocardial infarction: Father.    Myocardial infarction: Father.    Tobacco abuse: Father.  Immunizations      Vaccine Date Status      influenza virus vaccine, inactivated 10/30/2019 Given      influenza virus vaccine, inactivated 12/08/2018 Recorded      influenza 01/04/2018 Recorded      influenza virus vaccine, inactivated 01/07/2017 Recorded      Td 08/11/2016 Recorded      pneumococcal (PCV13) 08/11/2016 Recorded      influenza virus vaccine, inactivated 10/16/2015 Given      pneumococcal (PCV13) 04/16/2015 Given      influenza virus vaccine, inactivated 10/31/2013 Given      influenza virus vaccine, inactivated 01/10/2013 Given      pneumococcal (PPSV23) 03/31/2011 Given      ZOS, shingles 03/31/2011 Recorded      Td 03/03/1998 Recorded      typhoid, inactivated 04/06/1995 Recorded      OPV 04/06/1995 Recorded      yellow fever 04/06/1995 Recorded      Td 01/01/1988 Recorded  Lab Results          Lab Results (Last 4 results within 90 days)           Sodium Level: 136 [135 mmol/L - 145 mmol/L] (12/12/19 15:12:00)          Potassium Level: 3.4 Low [3.5 mmol/L - 5 mmol/L] (12/12/19 15:12:00)          Chloride Level: 100 [98 mmol/L - 110 mmol/L] (12/12/19 15:12:00)          CO2 Level: 29 [21 mmol/L - 31 mmol/L] (12/12/19 15:12:00)          AGAP: 7 [5  - 18] (12/12/19 15:12:00)          Glucose Level: 105 High [65 mg/dL - 100 mg/dL] (12/12/19 15:12:00)          BUN: 15 [8 mg/dL - 25 mg/dL] (12/12/19 15:12:00)          Creatinine Level: 0.90 [0.72 mg/dL - 1.25 mg/dL] (12/12/19 15:12:00)          BUN/Creat Ratio: 17 [10  - 20]  (12/12/19 15:12:00)          eGFR : >60 (12/12/19 15:12:00)          eGFR Non-: >60 (12/12/19 15:12:00)          Calcium Level: 8.7 [8.5 mg/dL - 10.5 mg/dL] (12/12/19 15:12:00)          Bilirubin Total: 1.1 [0.2 mg/dL - 1.2 mg/dL] (12/12/19 15:12:00)          Bilirubin Direct: 0.4 [0.1 mg/dL - 0.5 mg/dL] (12/12/19 15:12:00)          Bilirubin Indirect: 0.7 [0.2 mg/dL - 0.8 mg/dL] (12/12/19 15:12:00)          Alkaline Phosphatase: 118 [50 IU/L - 136 IU/L] (12/12/19 15:12:00)          AST/SGOT: 25 [2 IU/L - 40 IU/L] (12/12/19 15:12:00)          ALT/SGPT: 18 [8 IU/L - 45 IU/L] (12/12/19 15:12:00)          Protein Total: 7.0 [6 g/dL - 8 g/dL] (12/12/19 15:12:00)          Albumin Level: 3.6 [3.2 g/dL - 4.6 g/dL] (12/12/19 15:12:00)          Globulin: 3.4 [2 g/dL - 3.7 g/dL] (12/12/19 15:12:00)          A/G Ratio: 1.1 [1  - 2] (12/12/19 15:12:00)          WBC: 2.6 Low [4.5  - 11] (12/12/19 15:12:00)          RBC: 4.08 Low [4.3  - 5.9] (12/12/19 15:12:00)          Hgb: 12.3 Low [13.5 g/dL - 17.5 g/dL] (12/12/19 15:12:00)          Hct: 35.6 Low [37 % - 53 %] (12/12/19 15:12:00)          MCV: 87 [80 fL - 100 fL] (12/12/19 15:12:00)          MCH: 30.1 [26 pg - 34 pg] (12/12/19 15:12:00)          MCHC: 34.6 [32 g/dL - 36 g/dL] (12/12/19 15:12:00)          RDW: 13.9 [11.5 % - 15.5 %] (12/12/19 15:12:00)          Platelet: 54 Low [140  - 440] (12/12/19 15:12:00)          MPV: 11.5 High [6.5 fL - 11 fL] (12/12/19 15:12:00)          Metamyelocytes Man: 0.0 (12/12/19 15:12:00)          Myelocytes Man: 0.0 (12/12/19 15:12:00)          Promyelocytes Man: 0.0 (12/12/19 15:12:00)          Blasts Man: 0.0 (12/12/19 15:12:00)          Other Cells Man: 0.0 (12/12/19 15:12:00)          Lymphocytes: 22.0 (12/12/19 15:12:00)          Abs Lymphocytes: 0.6 Low [0.9  - 2.9] (12/12/19 15:12:00)          Neutrophils: 54.0 (12/12/19 15:12:00)          Abs Neutrophils: 1.4 Low [1.7  - 7] (12/12/19 15:12:00)           Monocytes: 21.0 (12/12/19 15:12:00)          Abs Monocytes: 0.5 (12/12/19 15:12:00)          Eosinophils: 3.0 (12/12/19 15:12:00)          Abs Eosinophils: 0.1 (12/12/19 15:12:00)          Basophils: 0.0 (12/12/19 15:12:00)          Abs Basophils: 0.0 (12/12/19 15:12:00)          Plasma Cells: 0.0 (12/12/19 15:12:00)          Platelet Estimate: Decreased (12/12/19 15:12:00)          RBC Comments: RBC morphology appears normal (12/12/19 15:12:00)          Plt Large: Present (12/12/19 15:12:00)          Influenza A: NEGATIVE [NEGATIVE  - NEGATIVE] (11/18/19 13:01:00)          Influenza B: NEGATIVE [NEGATIVE  - NEGATIVE] (11/18/19 13:01:00)          Lyme Ab IgG/IgM WB: <0.90 (11/18/19 12:59:00)  Diagnostic Results   Chest x-ray negative.

## 2022-02-15 NOTE — LETTER
(Inserted Image. Unable to display) October 31, 2019Re: CHANA FUNESRICARDODOB:  1945 [address] [place], [place] [address]Zd:  Hoa Prado following patient has been referred to your office/practice: CHANA FUNESRICARDO  Appointment scheduled for 11/4/19 at 10:40 a.m. with Dr. Field at [facility].Please refer to the attached clinical documentation for a summary of CHANA's care.  Please do not hesitate to contact our office if any additional clinical questions arise.  All relevant records and transition of care documents should be mailed or faxed.  Your assistance in providing continuity of care is appreciated.Sincerely, [facility] & [address] [address](P) [phone](F) [phone]

## 2022-02-15 NOTE — NURSING NOTE
Medicare Visit Entered On:  6/15/2021 1:43 PM CDT    Performed On:  6/15/2021 1:40 PM CDT by Trinidad Ward               Summary   Chief Complaint :   AWV   Advance Directive :   Yes   Weight Measured :   152.6 lb(Converted to: 152 lb 10 oz, 69.218 kg)    Height Measured :   69 in(Converted to: 5 ft 9 in, 175.26 cm)    Body Mass Index :   22.53 kg/m2   Body Surface Area :   1.83 m2   Systolic Blood Pressure :   135 mmHg (HI)    Diastolic Blood Pressure :   77 mmHg   Mean Arterial Pressure :   96 mmHg   Peripheral Pulse Rate :   73 bpm   BP Method :   Electronic   HR Method :   Electronic   Temperature Tympanic :   97.9 DegF(Converted to: 36.6 DegC)    Trindiad Ward - 6/15/2021 1:40 PM CDT   Health Status   Allergies Verified? :   Yes   Medication History Verified? :   Yes   Trinidad Ward - 6/15/2021 1:40 PM CDT   Social History   Social History   (As Of: 6/15/2021 1:43:05 PM CDT)   Alcohol:  Current      Current, Daily, 2 drinks/episode average.  2 drinks/episode maximum.   (Last Updated: 4/6/2017 3:51:42 PM CDT by Marya Morrison)          Tobacco:        Never (less than 100 in lifetime)   (Last Updated: 5/12/2021 11:21:10 AM CDT by Trinidad Ward)          Electronic Cigarette/Vaping:        Electronic Cigarette Use: Never.   (Last Updated: 5/12/2021 11:21:14 AM CDT by Trinidad Ward)          Substance Abuse:  Denies Substance Abuse      (Last Updated: 12/28/2010 9:23:43 AM CST by Laura Justice )         Employment/School:        Employed, Work/School description: .   (Last Updated: 12/28/2010 9:23:52 AM CST by Laura Justice)          Home/Environment:        Marital status: .  Spouse/Partner name: Alona Simons.  Living situation: Home/Independent.   (Last Updated: 4/29/2019 2:17:37 PM CDT by Monet Salas)          Exercise:  Regular exercise      (Last Updated: 12/28/2010 9:23:54 AM CST by Laura Justice )           Geriatric Depression Screening   Geriatric Depression  Satisfied Life :   Yes   Geriatric Depression Dropped Activities :   No   Geriatric Depression Life Empty :   No   Geriatric Depression Bored :   No   Geriatric Depression Good Spirits :   Yes   Geriatric Depression Afraid Bad Things :   No   Geriatric Depression Feel Happy :   Yes   Geriatric Depression Feel Helpless :   No   Geriatric Depression Prefer to Stay Home :   No   Geriatric Depression Memory Problems :   No   Geriatric Depression Wonderful Be Alive :   Yes   Geriatric Depression Feel Worthless :   No   Geriatric Depression Situation Hopeless :   No   Geriatric Depression Others Better Off :   No   Geriatric Depression Full of Energy :   Yes   Geriatric Depression Total Score :   0    Amparo DELUNA Trinidad - 6/15/2021 1:40 PM CDT   Home Safety Screen   Emergency Numbers Kept/Updated :   No   Aware of Smoking Dangers :   Yes   Smoke Alarms/Fire Extinguisher Available :   Yes   Household Members Fire Safety Knowledge :   Yes   Floor Rugs Removed or Fastened :   Yes   Mats in Bathtub/Shower :   Yes   Stairway Rails or Banisters :   Yes   Outdoor Clutter Safety :   Yes   Indoor Clutter Safety :   Yes   Electrical Cord Safety :   Yes   Amparo DELUNA Trinidad - 6/15/2021 1:40 PM CDT   Advance Directives   Advance Directive :   Yes   Advanced Directives Status :   Current and verified   Advance Directive Type :   Living will, Medical durable power of    Medical Power of  Name :   Alona Ronak  Aisha Christophe   Advance Directive Intent Stated By :   Self   Advance Directive Additional Information :   No   Amparo CHAOGREGORY Trinidad - 6/15/2021 1:40 PM CDT   Naylor Fall Risk   History of Fall in Last 3 Months Naylor :   No   Amparo CHAOGREGORY Trinidad - 6/15/2021 1:40 PM CDT   Functional Assessment   Focused Functional Assessment Grid   Bathing :   Independent (2)   Dressing :   Independent (2)   Toileting :   Independent (2)   Transferring Bed or Chair :   Independent (2)   Continence :   Independent (2)   Feeding :    Independent (2)   Trinidad Ward - 6/15/2021 1:40 PM CDT   ADL Index Score :   12    Capable of Shopping :   Yes   Capable of Walking :   Yes   Capable of Housekeeping :   Yes   Capable of Managing Medications :   Yes   Capable of Handling Finances :   Yes   Trinidad Ward - 6/15/2021 1:40 PM CDT

## 2022-02-15 NOTE — TELEPHONE ENCOUNTER
"---------------------  From: Rosey Warner CMA (Phone Messages Pool ([id number]_[facility]))   To: [employer] Message Pool ([id number]_[place] [facility] [place]);     Sent: 5/19/2019 9:21:21 AM CDT  Subject: CONSUMER MESSAGE FW: Attchristo Frances           ---------------------  From: GAURANG BENITEZ  To: [facility]  Sent: 05/19/2019 09:12 a.m. CDT  Subject: Attchristo Frances,    Last night I stopped using the telemetry.  The reason was that I was experiencing signigicant skin irritation in the areas of electrode placement.  Also, I have not experienced any of the \"fluttering\" that I had noticed last month.  I did record a few \"manual events\", not because I was necessarily  experiencing any symptoms ... just to help you determine whether you spot any irregularities.    Gaurang Benitez---------------------  From: Alyssa Zambrano CMA ([employer] Message Pool ([id number]_[place] [facility] [place]))   To: Macario Frances MD;     Sent: 5/20/2019 7:37:33 AM CDT  Subject: FW: CONSUMER MESSAGE FW: Attchristo Frances---------------------  From: Macario Frances MD   To: [employer] Message Pool ([id number]_[place] [facility] [place]);     Sent: 5/20/2019 8:40:53 AM CDT  Subject: RE: CONSUMER MESSAGE FW: Attchristo Frances     Call if symptoms recurr.[facility] at 0859. Advised patient to call if symptoms reoccur.  "

## 2022-02-15 NOTE — TELEPHONE ENCOUNTER
"---------------------  From: Domitila Crawford LPN (Phone Messages Pool ([id number]))   To: [facility] Practice Provider [place] ([id number]); GAURANG BENITEZ    Sent: 11/18/2019 10:58:57 AM CST  Subject: FW: Question for Dr. Allie Merlos, Allie Santos is not in clinic today but this is being forwarded to another provider for advise.      ---------------------  From: GAURANG BENITEZ  To: [facility]  Sent: 11/18/2019 09:42 a.m. CST  Subject: Question for Dr. Kelley  Gerardo -- I took the one dosage of doxycylcline the day I last saw you.  Last week, I experienced significant loss of energy on Tuesday, was better on Wed., and then the same loss of energy on Thursday.  After that I was OK, but Sat. night I woke up with a splitting headache and upset stomach, and sweats.  Sunday, \"sick in bed\", nausea, little appetite, low energy and continuous low-grade headache.  This continues into today.    I have been guessing that I have the flu, but in view of the recent tick bite, and now wondering if this is Lyme-related.    So my question:  should I take another dose of the doxy?    Let me know if you would prefer to have me come in.    Gaurang---------------------  From: Meliton Esparza PA-C (Advanced Practice Provider Pool ([id number]))   To: Phone [employer] Pool ([id number]); GAURANG BENITEZ    Sent: 11/18/2019 11:06:28 AM CST  Subject: RE: Question for Dr. Kelley     It would be best if he came in for a clinic visit for exam and possible lab testing---------------------  From: Domitila Crawford LPN (Phone Messages Pool ([id number]_[place]))   To: GAURANG BENITEZ    Sent: 11/18/2019 11:15:56 AM CST  Subject: FW: Question for Dr. Kelley     Sent message in new portal message  "

## 2022-02-15 NOTE — PROGRESS NOTES
Patient:   CHANA BENITEZ            MRN: [id number]            FIN: [id number]               Age:   76 years     Sex:  Male     :  1945   Associated Diagnoses:   COVID-19 virus infection   Author:   Isaias DAY, Meliton REBOLLEDO      Visit Information      Date of Service: 2021 09:12 am  Performing Location: [facility]  Encounter#: [id number]      Primary Care Provider (PCP):  Jason Kelley MD# [id number]   Visit type:  Telephone Encounter.    Source of history:  Patient.    Location of patient:  _home  Call Start Time:   _  Call End Time:    _1015      Chief Complaint   2021 9:57 AM CDT    Verbal consent given for a telephone visit.  Pt states he had a positive Home covid test yesterday(10/31/21) Pt is c/o cough and fever x 5-6 days     _      History of Present Illness   Today's visit was conducted via telephone due to the COVID-19 pandemic. Patient's consent to telephone visit was obtained and documented.      Reason for visit:  _ Chief complaint and symptoms noted above and confirmed with patient   on 10/27 he started developing cold sxs with cough, fever, post nasal drip  he did a home Covid test yesterday that was positive  he started feeling worse yesterday  he completed the Moderna Covid vaccine series in 2021      Review of Systems   Constitutional:  Fever.    Respiratory:  Cough, Sputum production, No shortness of breath.       Impression and Plan   Diagnosis     COVID-19 virus infection ([id number] U07.1).     Summary:  because of his age and hx of HTN, will order monoclonal antibody treatment, follow up as needed.    Orders     Orders   Charges (Evaluation and Management):  [id number] office o/p est low 20-29 min (Charge) (Order): Quantity: 1, COVID-19 virus infection.        Health Status   Allergies:    Nonallergic Reactions (Selected)  Severity Not Documented  HYDROcodone (Nausea and nausea & vomiting.)  OxyCODONE (Nausea)   Medications:  (Selected)    Prescriptions  Prescribed  hydroCHLOROthiazide 12.5 mg oral capsule: = 1 cap(s), Oral, daily, Instructions: dose decrease., # 90 cap(s), 0 Refill(s), Pharmacy: [facility], TAKE 1 CAPSULE BY MOUTH ONCE DAILY - dose decrease, 69, in, 06/15/21 13:40:00 CDT, Height Measured, 152.6, lb, 06/15/21 13:40:00 CDT, Weight M...  lisinopril 40 mg oral tablet: = 1 tab(s), Oral, daily, # 90 tab(s), 0 Refill(s), Pharmacy: [facility], TAKE 1 TABLET BY MOUTH ONCE DAILY, 69, in, 06/15/21 13:40:00 CDT, Height Measured, 152.6, lb, 06/15/21 13:40:00 CDT, Weight Measured  tamsulosin 0.4 mg oral capsule: = 1 cap(s), Oral, daily, # 90 EA, 3 Refill(s), Type: Maintenance, Pharmacy: [facility], TAKE ONE CAPSULE BY MOUTH ONCE DAILY, 69, in, 06/15/21 13:40:00 CDT, Height Measured, 152.6, lb, 06/15/21 13:40:00 CDT, Weight Measured  Documented Medications  Documented  timolol: Oral, daily, Instructions: Rx'd by Opthamology, 0 Refill(s), Type: Maintenance   Problem list:    All Problems  Hypertension / SNOMED CT 8901933909 / Confirmed  Palpitations / SNOMED CT 808399446 / Confirmed  Hearing loss in left ear / SNOMED CT 814266410 / Confirmed  Agatston coronary artery calcium score less than 100 / SNOMED CT 6295097486 / Confirmed  H/O babesiosis / SNOMED CT 6072984252 / Confirmed  Seasonal allergies / SNOMED CT 728176595 / Confirmed  Family history of heart disease / SNOMED CT [id number] / Confirmed  Dyslipidemia, goal LDL below 130 / SNOMED CT 82455283 / Confirmed      Histories   Past Medical History:    Active  Hypertension (8868343793)  Dyslipidemia, goal LDL below 130 (26524409)  Comments:  4/15/2014 CDT 11:01 AM CDMacario Valdovinos MD  10 year risk 14.8%. Moderate to high intensity statin indicated.    4/6/2017 CDT 2:49 PM Macario Callejas MD  10 year estimated risk 21%    4/26/2018 CDT 10:27 AM Macario Callejas MD  10 year estimated risk 17.5% improved to 14.1% with optimal  [facility]  Agatston coronary artery calcium score less than 100 (0640921443)  Comments:  10/16/2015 CDT 8:45 AM CDT - Macario Frances MD  62  Hearing loss in left ear (648198951)  Seasonal allergies (938238294)  Palpitations (615711339)   Family History:    Hypertension  Mother (): onset at 60 .  Hypothyroidism  Mother ()  Daughter (Aisha)  Myocardial infarction  Father ()  Tobacco abuse  Father ()  MI - Myocardial infarction  Father ()  Comments:  2013 5:04 PM CST - Petty Raymond  First MI late 40s.     Procedure history:    Screening for malignant neoplasm of colon (3782842824) on 2021 at 76 Years.  Comments:  2021 9:26 AM CDT - Felecia Nicole - Negative  Cardiac computed tomography for calcium scoring (2492102182) on 3/25/2014 at 68 Years.  Comments:  2017 3:44 PM CDT - Marya Morrison  Total Agatston calcium score is 62  Colonoscopy (095321032) on 2011 at 66 Years.  Comments:  2011 12:58 PM CDT - Macario Frances MD  Negative. Repeat in 10 years.  Knee Arthrosopy in  at 62 Years.  Colonoscopy (412373002) on 3/15/1996 at 50 Years.  Comments:  3/31/2011 9:45 AM Macario Callejas MD  normal  Rectal biopsy (987265528) on 3/15/1996 at 50 Years.  Appendectomy (269374995) in  at 15 Years.   Social History:        Electronic Cigarette/Vaping Assessment            Electronic Cigarette Use: Never.      Alcohol Assessment: Current            Current, Daily, 2 drinks/episode average.  2 drinks/episode maximum.      Tobacco Assessment            Never (less than 100 in lifetime)      Substance Abuse Assessment: Denies Substance Abuse      Employment and Education Assessment            Employed, Work/School description: .      Home and Environment Assessment            Marital status: .  Spouse/Partner name: Alona Simons.  Living situation: Home/Independent.      Exercise and Physical Activity Assessment: Regular  exercise        Physical Examination   Measurements from flowsheet : Measurements   11/1/2021 9:57 AM CDT    Height Measured - Standard                69 in

## 2022-02-15 NOTE — LETTER
(Inserted Image. Unable to display)   January 17, 2020      CHANA BENITEZ       [address] [place], [place] [id number]        Dear CHANA,    Thank you for selecting [facility] for your healthcare needs.  Below you will find the results of your recent tests done at our clinic.     Counts are improving. Have a good time in [place]. Repeat blood work when your return.      Result Name Current Result Reference Range   WBC  4.2 1/16/2020 3.8 - 10.8   Hgb (gm/dL) ((L)) 12.6 1/16/2020 13.2 - 17.1   Platelet  349 1/16/2020 140 - 400       Please contact me or my assistant at [phone] if you have any questions.     Sincerely,        Macario Frances MD

## 2022-02-15 NOTE — RESULTS
(Inserted Image. Unable to display)          (Inserted Image. Unable to display)     [address] [place], [place] [address]  Phone [phone]  Fax [phone]       ACT REPORT    CHANA BENITEZ :  1945   Recording Period:   2019 MRN:  [id number]  Ordering HCP:  Macario Frances MD, FACP       INDICATION:  Palpitations    FINDINGS:  There were 719 hours and 13 minutes of recording, of which 378 hours and 35 minutes of data were usable.      Average heart rate of 68. Tachycardia present for 1% of readable data; bradycardia present for 31% of readable data.  No pauses of 3 seconds or longer.    There were 4 manually triggered events with symptoms including palpitation, flutter, rapid heart rate.  No dysrhythmia noted.  No atrial fibrillation.    IMPRESSION:  Negative study.                                                                                Macario Frances MD, FACP  Interpreting HCP                     ELIZABETH/giana  D:  2019                                                                          T:  2019    AMBULATORY CARDIAC TELEMETRY (ACT) REPORT

## 2022-02-15 NOTE — PROGRESS NOTES
Chief Complaint  rash on face started yesterday. Abd rash last week.  History of Present Illness  Had a flu shot one month ago. Developed a rash on abdomen and chest within a couple days. Has a residual rash. One week ago had itching on left cheek. Had some oozing from the left cheek clear liquid. Noticed the right cheek getting red today. Rash on abdomen and back does not itch  Review of Systems  Denies fevers and vomiting. Energy is good.  Problem List/Past Medical History  Ongoing  Agatston coronary artery calcium score less than 100  Dyslipidemia, goal LDL below 130  Family history of heart disease  Hearing loss in left ear  Hypertension  Resolved  No resolved problems  Procedure/Surgical History  Colonoscopy (08/17/2011),  Knee Arthrosopy (2007),  Colonoscopy (03/15/1996),  Rectal biopsy (03/15/1996),  Appendectomy (1960).  Home Medications  doxycycline hyclate 100 mg oral tablet, 100 mg, 1 tab(s), po, bid  hydrochlorothiazide-lisinopril 12.5 mg-20 mg oral tablet, 1 tab(s), po, daily, 3 refills  predniSONE 50 mg oral tablet, 50 mg, 1 tab(s), po, daily  traMADol 50 mg oral tablet, 50 mg, 1 tab(s), po, q4-6 hrs, PRN  Allergies  HYDROcodone?(nausea, Nausea & vomiting.)  oxyCODONE?(Nausea)  Social History  Exercise and Physical Activity - Regular exercise  Home and Environment  Marital status: .  Other  Marital status  Family History  Hypertension: Mother.  Hypothyroidism: Mother  and Daughter.  MI - Myocardial infarction: Father.  Myocardial infarction: Father.  Tobacco abuse: Father.  Immunizations  Physical Exam  Vitals & Measurements  T:?97.0?(Tympanic)?  HR:?66?(Peripheral)?  BP:?142/81?  HT:?69?in?  WT:?169?lb?  BMI:?24.95?  General: No acute distress  Muscle skeletal: Normal gait  Lungs: Clear  Heart: Regular rate and rhythm  Skin: 3 mm macular lesions on the chest, abdomen and back. ?Also to 1 cm lesions on the cheeks. ?Lesions are blanching erythema with some crusting.  Lab Results  Diagnostic  Results  Assessment/Plan  Rash  We will start prednisone for possible allergy. ?If not improving over the next few days will start doxycycline. ?Follow-up if not getting better or getting worse. ?Does have an appointment with a dermatologist next week and will check in at that appointment as well.  Orders:  doxycycline, 1 tab(s) ( 100 mg ), PO, bid, Instructions: fill if not improving over the next 7 days, # 14 tab(s), 0 Refill(s), Type: Maintenance, Pharmacy: [facility], 1 tab(s) po bid,x7 day(s),Instr:fill if not improving over the next 7 days  predniSONE, 1 tab(s) ( 50 mg ), PO, Daily, # 10 tab(s), 0 Refill(s), Type: Maintenance, Pharmacy: [facility], 1 tab(s) po daily,x10 day(s)

## 2022-02-15 NOTE — TELEPHONE ENCOUNTER
"---------------------  From: Domitila gonzales LPN (Phone Messages Pool ([id number]))   To: [employer] Message Pool ([id number]);     Sent: 1/13/2020 8:28:37 AM CST  Subject: FW: Attn:  Dr. Frances           ---------------------  From: GAURANG BENITEZ  To: [facility]  Sent: 01/11/2020 11:30 a.m. CST  Subject: Attn: Dr. Juan Daniel Montez,    I saw the results of the recent blood smear.  I note that it s referred to as a \"malaria smear\", but assume it is meant to cover the search for babesia.  Am I right about that?    Assuming it does cover babesia, iooks like it was negative.  But the report conditions that finding -  \"one negative specimen does not rule out ...\".  I am feeling better each day, so do  you think we re OK now?  Or are more specimens called for?    Also, we leave town on Jan. 23.  Do you think I should line up another blood test to see how blood cell counts, etc are doing?  (We ll be gone for two months).    Thanks    Gaurang Benitez  [birth year]---------------------  From: An/Darling RODRIGUEZ ([employer] Message Pool ([id number]))   To: Macario Frances MD;     Sent: 1/13/2020 8:45:51 AM CST  Subject: FW: Attn:  Dr. Frances---------------------  From: Macario Frances MD   To: [employer] Message Pool ([id number]);     Sent: 1/13/2020 9:08:54 AM CST  Subject: RE: Attn:  Dr. Frances     Yes, It is a blood smear to look for parasites like malaria and babesiosis-just goes by that name. I would recommend another smear only if not continuing to get better. Another CBC a few days before you leave would be a good idea.---------------------  From: An/Darling RODRIGUEZ ([facility] Message Pool ([id number]_[facility]))   To: GAURANG BENITEZ    Sent: 1/13/2020 9:32:46 AM CST  Subject: FW: Attn:  Dr. Juan Daniel Merlos,    Dr. Frances's response is below.     Thank You!    Darling"

## 2022-02-15 NOTE — LETTER
(Inserted Image. Unable to display)   January 07, 2020      CHANA BENITEZ       [address] [place], [place] [id number]        Dear CHANA,    Thank you for selecting [facility] ([facility] for your healthcare needs.  Below you will find the results of your recent tests done at our clinic.     Improving. Repeat in 3 months.      Result Name Current Result Reference Range   WBC ((L)) 3.5 1/6/2020 3.8 - 10.8   Hgb (gm/dL) ((L)) 11.9 1/6/2020 13.2 - 17.1   Platelet  223 1/6/2020 140 - 400       Please contact me or my assistant at [phone] if you have any questions.     Sincerely,        Macario Frances MD

## 2022-02-15 NOTE — NURSING NOTE
Medicare Visit Entered On:  4/29/2019 11:58 AM CDT    Performed On:  4/29/2019 11:53 AM CDT by Alyson Muñoz MA               Summary   Chief Complaint :   AWV   Weight Measured :   159 lb(Converted to: 159 lb 0 oz, 72.12 kg)    Height Measured :   69 in(Converted to: 5 ft 9 in, 175.26 cm)    Body Mass Index :   23.48 kg/m2   Body Surface Area :   1.87 m2   Systolic Blood Pressure :   145 mmHg (HI)    Diastolic Blood Pressure :   87 mmHg (HI)    Mean Arterial Pressure :   106 mmHg   Peripheral Pulse Rate :   70 bpm   BP Site :   Right arm   Alyson Muñoz MA - 4/29/2019 11:53 AM CDT   Health Status   Allergies Verified? :   Yes   Medication History Verified? :   Yes   Tobacco Use? :   Never smoker   Alyson Muñoz MA - 4/29/2019 11:53 AM CDT   Meds / Allergies   (As Of: 4/29/2019 11:58:06 AM CDT)   Allergies (Active)   HYDROcodone  Estimated Onset Date:   Unspecified ; Reactions:   nausea, Nausea & vomiting. ; Created By:   Macario Frances MD; Reaction Status:   Active ; Category:   Drug ; Substance:   HYDROcodone ; Type:   Intolerance ; Updated By:   Macario Frances MD; Reviewed Date:   6/28/2016 4:59 PM CDT      oxyCODONE  Estimated Onset Date:   <not entered> 8/3/2012 ; Reactions:   Nausea ; Created By:   Macario Frances MD; Reaction Status:   Active ; Category:   Drug ; Substance:   oxyCODONE ; Type:   Side Effect ; Updated By:   Macario Frances MD; Reviewed Date:   6/28/2016 4:59 PM CDT        Medication List   (As Of: 4/29/2019 11:58:06 AM CDT)   Prescription/Discharge Order    hydroCHLOROthiazide  :   hydroCHLOROthiazide ; Status:   Prescribed ; Ordered As Mnemonic:   hydroCHLOROthiazide 12.5 mg oral capsule ; Simple Display Line:   12.5 mg, 1 cap(s), po, daily, for 90 day(s), 90 cap(s), 3 Refill(s) ; Ordering Provider:   Macario Frances MD; Catalog Code:   hydroCHLOROthiazide ; Order Dt/Tm:   4/26/2018 10:23:37 AM          lisinopril  :   lisinopril ; Status:   Prescribed ; Ordered As Mnemonic:   lisinopril 40 mg  oral tablet ; Simple Display Line:   40 mg, 1 tab(s), po, daily, for 90 day(s), 90 tab(s), 3 Refill(s) ; Ordering Provider:   Macario Frances MD; Catalog Code:   lisinopril ; Order Dt/Tm:   4/26/2018 10:23:40 AM            Social History   Social History   (As Of: 4/29/2019 11:58:06 AM CDT)   Alcohol:  Current      Current, Daily, 2 drinks/episode average.  2 drinks/episode maximum.   (Last Updated: 4/6/2017 3:51:42 PM CDT by Marya Morrison)          Tobacco:  Denies Tobacco Use      Never smoker   (Last Updated: 4/6/2017 3:52:06 PM CDT by Marya Morrison)          Substance Abuse:  Denies Substance Abuse      (Last Updated: 12/28/2010 9:23:43 AM CST by Laura Justice )         Employment and Education:        Employed, Work/School description: .   (Last Updated: 12/28/2010 9:23:52 AM CST by Laura Justice)          Home and Environment:        Marital status: .  Spouse/Partner name: Alona Simons.   (Last Updated: 4/6/2017 3:52:50 PM CDT by Marya Morrison)          Exercise and Physical Activity:  Regular exercise      (Last Updated: 12/28/2010 9:23:54 AM CST by Laura Justice )           Geriatric Depression Screening   Geriatric Depression Satisfied Life :   Yes   Geriatric Depression Dropped Activities :   No   Geriatric Depression Life Empty :   No   Geriatric Depression Bored :   No   Geriatric Depression Good Spirits :   Yes   Geriatric Depression Afraid Bad Things :   No   Geriatric Depression Feel Happy :   Yes   Geriatric Depression Feel Helpless :   No   Geriatric Depression Prefer to Stay Home :   No   Geriatric Depression Memory Problems :   No   Geriatric Depression Wonderful Be Alive :   Yes   Geriatric Depression Feel Worthless :   No   Geriatric Depression Situation Hopeless :   No   Geriatric Depression Others Better Off :   No   Geriatric Depression Full of Energy :   Yes   Geriatric Depression Total Score :   0    Alyson Muñoz MA - 4/29/2019 11:53 AM CDT   Hearing and Vision  Screening   Audiogram Result Right Ear :   Fail   Audiogram Result Left Ear :   Fail   Hearing Screen Comments :   hearing aides   Alyson Muñoz MA - 4/29/2019 11:53 AM CDT   Home Safety Screen   Emergency Numbers Kept/Updated :   No   Aware of Smoking Dangers :   Yes   Smoke Alarms/Fire Extinguisher Available :   Yes   Household Members Fire Safety Knowledge :   Yes   Floor Rugs Removed or Fastened :   No   Mats in Bathtub/Shower :   Yes   Stairway Rails or Banisters :   Yes   Outdoor Clutter Safety :   Yes   Indoor Clutter Safety :   Yes   Electrical Cord Safety :   Yes   Alyson Muñoz MA - 4/29/2019 11:53 AM CDT   Naylor Fall Risk   History of Fall in Last 3 Months Naylor :   No   Alyson Muñoz MA - 4/29/2019 11:53 AM CDT   Functional Assessment   Focused Functional Assessment Grid   Bathing :   Independent (2)   Dressing :   Independent (2)   Toileting :   Independent (2)   Transferring Bed or Chair :   Independent (2)   Feeding :   Independent (2)   Alyson Muñoz MA - 4/29/2019 11:53 AM CDT   Capable of Shopping :   Yes   Capable of Walking :   Yes   Capable of Housekeeping :   Yes   Capable of Managing Medications :   Yes   Capable of Handling Finances :   Yes   Alyson Muñoz MA 4/29/2019 11:53 AM CDT

## 2022-02-15 NOTE — RESULTS
(Inserted Image. Unable to display)          (Inserted Image. Unable to display)     [address] [place], [place] [address]  Phone [phone]  Fax [phone]        HOLTER MONITOR REPORT    CHANA BENITEZ   : 1945  Date of Exam: 2019   MRN: [id number]  Ordering HCP: Macario Frances MD, FACP      INDICATION:  Palpitations.    FINDINGS:  Holter monitor recording started on 2019 at 3:47 p.m. with 23 hours and 55 minutes of recording time and 23 hours and 14 minutes of analysis time.  The rhythm is sinus.  Average rate of 69, maximum rate of 105, and minimum rate of 43.   There were 717 ventricular ectopics including 8 pairs.     There were 539 supraventricular ectopics including 9 short runs the longest of 7 beats.     No patient symptom events.   No atrial fibrillation.   No pauses.       IMPRESSION:  1. Short asymptomatic self-limited runs of SVT.          2. Modestly increased PVCs.         3. Await results of 30 day monitoring with regards to his symptoms as he was asymptomatic today.                                                                             Macario Frances MD, FACP  Interpreting HCP     ELIZABETH/karen                 D:  2019                                                               T:  2019

## 2022-02-15 NOTE — NURSING NOTE
Comprehensive Intake Entered On:  5/12/2021 11:22 AM CDT    Performed On:  5/12/2021 11:20 AM CDT by Trinidad Ward               Summary   Chief Complaint :   Left thumb pain, possible cyst.    Advance Directive :   Yes   Weight Measured :   158.5 lb(Converted to: 158 lb 8 oz, 71.894 kg)    Systolic Blood Pressure :   159 mmHg (HI)    Diastolic Blood Pressure :   95 mmHg (HI)    Mean Arterial Pressure :   116 mmHg   Peripheral Pulse Rate :   62 bpm   BP Method :   Electronic   HR Method :   Electronic   Temperature Tympanic :   97.8 DegF(Converted to: 36.6 DegC)  (LOW)    Trinidad Ward - 5/12/2021 11:20 AM CDT   Health Status   Allergies Verified? :   Yes   Medication History Verified? :   Yes   Tobacco Use? :   Never smoker   Trinidad Ward - 5/12/2021 11:20 AM CDT   Meds / Allergies   (As Of: 5/12/2021 11:22:33 AM CDT)   Allergies (Active)   HYDROcodone  Estimated Onset Date:   Unspecified ; Reactions:   nausea, Nausea & vomiting. ; Created By:   Macario Frances MD; Reaction Status:   Active ; Category:   Drug ; Substance:   HYDROcodone ; Type:   Intolerance ; Updated By:   Macario Frances MD; Reviewed Date:   1/6/2020 10:18 AM CST      oxyCODONE  Estimated Onset Date:   <not entered> 8/3/2012 ; Reactions:   Nausea ; Created By:   Macario Frances MD; Reaction Status:   Active ; Category:   Drug ; Substance:   oxyCODONE ; Type:   Side Effect ; Updated By:   Macario Frances MD; Reviewed Date:   1/6/2020 10:18 AM CST        Medication List   (As Of: 5/12/2021 11:22:33 AM CDT)   Prescription/Discharge Order    hydroCHLOROthiazide  :   hydroCHLOROthiazide ; Status:   Prescribed ; Ordered As Mnemonic:   hydroCHLOROthiazide 12.5 mg oral capsule ; Simple Display Line:   12.5 mg, 1 cap(s), po, daily, for 90 day(s), 90 cap(s), 0 Refill(s) ; Ordering Provider:   Macario Frances MD; Catalog Code:   hydroCHLOROthiazide ; Order Dt/Tm:   3/27/2020 12:07:38 PM CDT          lisinopril  :   lisinopril ; Status:    Prescribed ; Ordered As Mnemonic:   lisinopril 40 mg oral tablet ; Simple Display Line:   40 mg, 1 tab(s), po, daily, for 90 day(s), 90 tab(s), 0 Refill(s) ; Ordering Provider:   Macario Frances MD; Catalog Code:   lisinopril ; Order Dt/Tm:   3/27/2020 12:07:21 PM CDT            ID Risk Screen   Recent Travel History :   No recent travel   Family Member Travel History :   No recent travel   Other Exposure to Infectious Disease :   Unknown   COVID-19 Testing Status :   No positive COVID-19 test   Trinidad Ward - 5/12/2021 11:20 AM CDT   Social History   Social History   (As Of: 5/12/2021 11:22:33 AM CDT)   Alcohol:  Current      Current, Daily, 2 drinks/episode average.  2 drinks/episode maximum.   (Last Updated: 4/6/2017 3:51:42 PM CDT by Marya Morrison)          Tobacco:        Never (less than 100 in lifetime)   (Last Updated: 5/12/2021 11:21:10 AM CDT by Trinidad Ward)          Electronic Cigarette/Vaping:        Electronic Cigarette Use: Never.   (Last Updated: 5/12/2021 11:21:14 AM CDT by Trinidad Ward)          Substance Abuse:  Denies Substance Abuse      (Last Updated: 12/28/2010 9:23:43 AM CST by Laura Justice )         Employment/School:        Employed, Work/School description: .   (Last Updated: 12/28/2010 9:23:52 AM CST by Laura Justice)          Home/Environment:        Marital status: .  Spouse/Partner name: Alona Simons.  Living situation: Home/Independent.   (Last Updated: 4/29/2019 2:17:37 PM CDT by Monet Salas)          Exercise:  Regular exercise      (Last Updated: 12/28/2010 9:23:54 AM CST by Laura Justice )

## 2022-02-15 NOTE — TELEPHONE ENCOUNTER
---------------------  From: Zo Hernandez RN   To: INR Pool ( [id number]_[facility]); Phone Messages Pool (32224_[facility]);     Cc: Appointment Pool ([address]224_[place] [facility] [place]);      Sent: 4/29/2019 1:35:26 PM CDT  Subject: Holter monitor scheduling     Order received from [facility] for patient to have 30 day holter.    I called and LVM on pt home phone asking for a call back to discuss/schedule placement.    No removal appointment necessary.    Waiting for call back.this patient was just called (see below), will call again on 4/30.this has been scheduled

## 2022-02-15 NOTE — TELEPHONE ENCOUNTER
---------------------  From: Keena Linda CMA (eRx Pool ([id number]_[facility]))   To: [facility] Message Pool ([address]_[place] [facility] [place]);     Sent: 7/21/2021 5:50:52 AM CDT  Subject: FW: Medication Management   Due Date/Time: 7/21/2021 11:21:00 AM CDT       Documented med - please advise    Last visit 6/15/21 [facility] December        ** Patient matched by Keena Linda CMA on 7/21/2021 5:49:22 AM CDT **      ------------------------------------------  From: [place]  To: Jason Kelley MD  Sent: July 19, 2021 11:21:47 AM CDT  Subject: Medication Management  Due: July 17, 2021 12:54:51 PM CDT     ** On Hold Pending Signature **     Drug: tamsulosin (tamsulosin 0.4 mg oral capsule), TAKE ONE CAPSULE BY MOUTH ONCE DAILY  Quantity: 90 EA  Days Supply: 90  Refills: 3  Substitutions Allowed  Notes from Pharmacy:     Dispensed Drug: tamsulosin (tamsulosin 0.4 mg oral capsule), TAKE ONE CAPSULE BY MOUTH ONCE DAILY  Quantity: 90 EA  Days Supply: 90  Refills: 3  Substitutions Allowed  Notes from Pharmacy:  ---------------------------------------------------------------  From: Ignacia Mercado CMA ([facility] Message Pool ([id number]_[facility]))   To: Jason Kelley MD;     Sent: 7/21/2021 9:09:05 AM CDT  Subject: FW: Medication Management   Due Date/Time: 7/21/2021 11:21:00 AM CDT---------------------  From: Jason Kelley MD   To: [facility]    Sent: 7/21/2021 9:31:33 AM CDT  Subject: FW: Medication Management     ** Documented **  Complete:tamsulosin (tamsulosin 0.4 mg oral capsule)   Signed by Jason Kelley MD  7/21/2021 2:31:00 PM [facility]    ** Approved with modifications: **  tamsulosin (TAMSULOSIN HYDROCHLORIDE 0.4MG CAPSULE)  TAKE ONE CAPSULE BY MOUTH ONCE DAILY  Qty:  90 EA        Days Supply:  90        Refills:  3          Substitutions Allowed     Route To Pharmacy - [facility]

## 2022-02-15 NOTE — LETTER
(Inserted Image. Unable to display)   May 14, 2019      CHANA BENITEZ   [address] [place], [place] [id number]        Dear CHANA,      Thank you for selecting [facility] ([facility]viously [facility]) for your healthcare needs.    Our records indicate you are due for the following services:     Clinical Support Staff (CSS)-Only Blood Pressure Check ~ Please stop in anytime to have your blood pressure rechecked. This is a free service and no appointment necessary.    So we can best determine if your medications are effective in lowering your blood pressure, please make sure your blood pressure medicine has been in your system for at least 1-2 hours prior to coming in.  We encourage you to avoid caffeine or other stimulants prior to having your blood pressure checked and come at a time when you are not feeling rushed.     If you check your blood pressure at home, please bring in your blood pressure monitor and home blood pressure readings.  We will check your machine for accuracy and also share your home readings with your Healthcare Provider.    To schedule an appointment or if you have further questions, please contact your primary clinic:   [facility]       [phone]   [facility]       [phone]              [facility]     [phone]      Powered by [employer] and [employer]    Sincerely,    Macario Frances MD, FACP

## 2022-02-15 NOTE — TELEPHONE ENCOUNTER
---------------------  From: Jason Kelley MD   To: CHANA BENITEZ    Sent: 6/16/2021 8:41:17 AM CDT  Subject: General Message     All labs acceptable.  Looks like I did not remove the PSA from the lab request.  Please let us know you received this message by either selecting Forward or Reply at the top.  Thank you    Results:  Date Result Name Ind Value Ref Range   6/15/2021 2:15 PM Potassium Level  4.2 mmol/L (3.5 - 5.3)   6/15/2021 2:15 PM Glucose Level ((H)) 129 mg/dL (65 - 99)   6/15/2021 2:15 PM Creatinine Level  1.09 mg/dL (0.70 - 1.18)   6/15/2021 2:15 PM Cholesterol ((H)) 222 mg/dL ( - <200)   6/15/2021 2:15 PM HDL  83 mg/dL (> OR = 40 - )   6/15/2021 2:15 PM LDL ((H)) 117    6/15/2021 2:15 PM Triglyceride  108 mg/dL ( - <150)   6/15/2021 2:15 PM PSA  1.0 ng/mL ( - < OR = 4.0)   Epidural abscess

## 2022-02-15 NOTE — NURSING NOTE
Comprehensive Intake Entered On:  9/11/2019 10:58 AM CDT    Performed On:  9/11/2019 10:55 AM CDT by Trinidad Ward               Summary   Chief Complaint :   Had a cold which has resolved, but taste and smell have not returned. What he does smell is rancid.    Weight Measured :   159.8 lb(Converted to: 159 lb 13 oz, 72.48 kg)    Systolic Blood Pressure :   168 mmHg (HI)    Diastolic Blood Pressure :   95 mmHg (HI)    Mean Arterial Pressure :   119 mmHg   Peripheral Pulse Rate :   61 bpm   Vital Signs Comments :   Currently on OTC allergy meds.    BP Method :   Electronic   HR Method :   Electronic   Temperature Tympanic :   97.6 DegF(Converted to: 36.4 DegC)  (LOW)    Trinidad Ward - 9/11/2019 10:55 AM CDT   Health Status   Allergies Verified? :   Yes   Medication History Verified? :   Yes   Tobacco Use? :   Never smoker   Trinidad Ward - 9/11/2019 10:55 AM CDT   Meds / Allergies   (As Of: 9/11/2019 10:58:12 AM CDT)   Allergies (Active)   HYDROcodone  Estimated Onset Date:   Unspecified ; Reactions:   nausea, Nausea & vomiting. ; Created By:   Macario Frances MD; Reaction Status:   Active ; Category:   Drug ; Substance:   HYDROcodone ; Type:   Intolerance ; Updated By:   Macario Frances MD; Reviewed Date:   6/28/2016 4:59 PM CDT      oxyCODONE  Estimated Onset Date:   <not entered> 8/3/2012 ; Reactions:   Nausea ; Created By:   Macario Frances MD; Reaction Status:   Active ; Category:   Drug ; Substance:   oxyCODONE ; Type:   Side Effect ; Updated By:   Macario Frances MD; Reviewed Date:   6/28/2016 4:59 PM CDT        Medication List   (As Of: 9/11/2019 10:58:12 AM CDT)   Prescription/Discharge Order    lisinopril  :   lisinopril ; Status:   Prescribed ; Ordered As Mnemonic:   lisinopril 40 mg oral tablet ; Simple Display Line:   40 mg, 1 tab(s), po, daily, for 90 day(s), 90 tab(s), 3 Refill(s) ; Ordering Provider:   Macario Frances MD; Catalog Code:   lisinopril ; Order Dt/Tm:   4/29/2019 12:42:00 PM           hydroCHLOROthiazide  :   hydroCHLOROthiazide ; Status:   Prescribed ; Ordered As Mnemonic:   hydroCHLOROthiazide 12.5 mg oral capsule ; Simple Display Line:   12.5 mg, 1 cap(s), po, daily, for 90 day(s), 90 cap(s), 3 Refill(s) ; Ordering Provider:   Macario Frances MD; Catalog Code:   hydroCHLOROthiazide ; Order Dt/Tm:   4/29/2019 12:41:59 PM

## 2022-02-15 NOTE — NURSING NOTE
Pt monoclonal antibody tx form was completed by DAVID and faxed to [facility] [phone].  confirmation received.  Ede Nair CM<A

## 2022-02-15 NOTE — TELEPHONE ENCOUNTER
"---------------------  From: Marilee Dowling (Phone Messages Pool ([id number]))   To: [facility] Message Pool ([facility]);     Sent: 1/7/2020 9:49:29 AM CST  Subject: FW: Attention:  Dr. Frances or his Nurse           ---------------------  From: GAURANG BENITEZ  To: [facility]  Sent: 01/07/2020 09:43 a.m. CST  Subject: Attention: Dr. Frances or his Nurse  Good morning.  I took a look at yesterday s blood test results and note there are still abnormalities.  So my question:  is there something in particular I should be doing to move those results in the direction of \"normal\"?    Thanks.    Gaurang Benitez  [phone]---------------------  From: An/Darling RODRIGUEZ ([facility] Message Pool ([id number]))   To: Macario Frances MD;     Sent: 1/7/2020 10:06:49 AM CST  Subject: FW: Attention:  Dr. Frances or his Nurse---------------------  From: Macario Frances MD   To: [facility] Message Pool ([id number]);     Sent: 1/7/2020 5:26:07 PM CST  Subject: RE: Attention:  Dr. Frances or his Nurse     Patient will come in for blood smear. Test ordered.  "

## 2022-02-15 NOTE — NURSING NOTE
Comprehensive Intake Entered On:  10/25/2019 11:59 AM CDT    Performed On:  10/25/2019 11:56 AM CDT by Ignacia Esteves LPN               Summary   Chief Complaint :   had a cold over the summer and now smell and taste are not back to normal    Advance Directive :   Yes   Height Measured :   69 in(Converted to: 5 ft 9 in, 175.26 cm)    Temperature Tympanic :   97.2 DegF(Converted to: 36.2 DegC)  (LOW)    Ignacia Esteves LPN - 10/25/2019 11:56 AM CDT   Health Status   Allergies Verified? :   Yes   Medication History Verified? :   Yes   Pre-Visit Planning Status :   Completed   Ignacia Esteves LPN - 10/25/2019 11:56 AM CDT   Consents   Consent for Immunization Exchange :   Consent Granted   Consent for Immunizations to Providers :   Consent Granted   Ignacia Esteves LPN - 10/25/2019 11:56 AM CDT   Meds / Allergies   (As Of: 10/25/2019 11:59:44 AM CDT)   Allergies (Active)   HYDROcodone  Estimated Onset Date:   Unspecified ; Reactions:   nausea, Nausea & vomiting. ; Created By:   Macario Frances MD; Reaction Status:   Active ; Category:   Drug ; Substance:   HYDROcodone ; Type:   Intolerance ; Updated By:   Macario Frances MD; Reviewed Date:   10/25/2019 11:57 AM CDT      oxyCODONE  Estimated Onset Date:   <not entered> 8/3/2012 ; Reactions:   Nausea ; Created By:   Macario Frances MD; Reaction Status:   Active ; Category:   Drug ; Substance:   oxyCODONE ; Type:   Side Effect ; Updated By:   Macario Frances MD; Reviewed Date:   10/25/2019 11:57 AM CDT        Medication List   (As Of: 10/25/2019 11:59:44 AM CDT)   Prescription/Discharge Order    predniSONE  :   predniSONE ; Status:   Processing ; Ordered As Mnemonic:   predniSONE 10 mg oral tablet ; Ordering Provider:   Jason Kelley MD; Action Display:   Complete ; Catalog Code:   predniSONE ; Order Dt/Tm:   10/25/2019 11:59:05 AM CDT          fluticasone nasal  :   fluticasone nasal ; Status:   Prescribed ; Ordered As Mnemonic:   Flonase 50 mcg/inh  nasal spray ; Simple Display Line:   2 spray(s), Nasal, daily, 1 EA, 6 Refill(s) ; Ordering Provider:   Jason Kelley MD; Catalog Code:   fluticasone nasal ; Order Dt/Tm:   9/11/2019 11:04:38 AM CDT          lisinopril  :   lisinopril ; Status:   Prescribed ; Ordered As Mnemonic:   lisinopril 40 mg oral tablet ; Simple Display Line:   40 mg, 1 tab(s), po, daily, for 90 day(s), 90 tab(s), 3 Refill(s) ; Ordering Provider:   Macario Frances MD; Catalog Code:   lisinopril ; Order Dt/Tm:   4/29/2019 12:42:00 PM CDT          hydroCHLOROthiazide  :   hydroCHLOROthiazide ; Status:   Prescribed ; Ordered As Mnemonic:   hydroCHLOROthiazide 12.5 mg oral capsule ; Simple Display Line:   12.5 mg, 1 cap(s), po, daily, for 90 day(s), 90 cap(s), 3 Refill(s) ; Ordering Provider:   Macario Frances MD; Catalog Code:   hydroCHLOROthiazide ; Order Dt/Tm:   4/29/2019 12:41:59 PM CDT

## 2022-02-15 NOTE — TELEPHONE ENCOUNTER
---------------------  From: Joya Allen CMA (Phone Messages Pool ([id number]_[facility]))   To: Phone VHX ([address]_[facility]); CHANA BENITEZ    Sent: 12/17/2019 10:02:16 AM CST  Subject: RE: For Dr. Frances     pt called earlier see note in chart        ---------------------  From: CHANA BENITEZ  To: [facility]  Sent: 12/17/2019 08:59 a.m. CST  Subject: For Dr. Frances  I saw an infectious disease physician at [place] ([place] several years ago and would like to have him involved in what we re dealing with.  His name is Pablo Moreno (Jack) and he is in the infectious disease department.  He is willing to consult and has asked for the test result used for the diagnosis of babesiosis.    I will call the Clinic this morning and make this request, but wanted you to know what s happening on this end.    Feel free to call if there are any questions ([phone]).

## 2022-02-15 NOTE — NURSING NOTE
Healthy Active Living  An initiative of the American Academy of Pediatrics    Fact Sheet: Healthy Active Living for Families    Healthy nutrition starts as early as infancy with breastfeeding.  Once your baby begins eating solid foods, introduce nutritiou Quick Intake Entered On:  4/29/2019 12:15 PM CDT    Performed On:  4/29/2019 12:15 PM CDT by Macario Frances MD               Summary   Systolic Blood Pressure :   136 mmHg (HI)    Diastolic Blood Pressure :   76 mmHg   Mean Arterial Pressure :   96 mmHg   BP Site :   Right arm   BP Method :   Manual   Macario Frances MD - 4/29/2019 12:15 PM CDT   Stay involved in your teen’s life. Make sure your teen knows you’re always there when he or she needs to talk. During the teen years, it’s important to keep having yearly checkups. Your teen may be embarrassed about having a checkup.  Reassure your teen t · Body changes. The body grows and matures during puberty. Hair will grow in the pubic area and on other parts of the body. Girls grow breasts and menstruate (have monthly periods). A boy’s voice changes, becoming lower and deeper.  As the penis matures, er · Eat healthy. Your child should eat fruits, vegetables, lean meats, and whole grains every day. Less healthy foods—like french fries, candy, and chips—should be eaten rarely.  Some teens fall into the trap of snacking on junk food and fast food throughout · Encourage your teen to keep a consistent bedtime, even on weekends. Sleeping is easier when the body follows a routine. Don’t let your teen stay up too late at night or sleep in too long in the morning. · Help your teen wake up, if needed.  Go into the b · Set rules and limits around driving and use of the car. If your teen gets a ticket or has an accident, there should be consequences. Driving is a privilege that can be taken away if your child doesn’t follow the rules.   · Teach your child to make good de © 7108-9592 [employer] [address]. [address] [facility], [facility] [place]. All rights reserved. This information is not intended as a substitute for professional medical care. Always follow your healthcare professional's instructions.

## 2022-02-15 NOTE — TELEPHONE ENCOUNTER
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    P:  I reviewed this finding with the patient, recent tick with systemic illness.  No febrile currently.  He will discontinue doxycycline and substitute atovaquone 750 mg q.12h. for 7 days and azithromycin for 10 days.    D:  2019  T:  2019                                                Macario Frances MD, FACP/sq

## 2022-02-15 NOTE — LETTER
(Inserted Image. Unable to display)   November 19, 2019      CHANA BENITEZ       [address] [place] [id number]        Dear CHANA,    Thank you for selecting [facility] for your healthcare needs.  Below you will find the results of you recent tests done at our clinic.     Screening test for Lyme disease was negative.      Result Name Current Result Previous Result   Lyme Ab IgG/IgM WB  <0.90 11/18/2019  <0.90 5/28/2019       Please contact me or my assistant at [phone] if you have any questions.     Sincerely,        Meliton Esparza PA-C    What do your labs mean?  Below is a glossary of commonly ordered labs:  LDL   Bad Cholesterol   HDL   Good Cholesterol  AST/ALT   Liver Function   Cr/Creatinine   Kidney Function  Microalbumin   Kidney Function  BUN   Kidney Function  PSA   Prostate    TSH   Thyroid Hormone  HgbA1c   Diabetes Test   Hgb (Hemoglobin)   Red Blood Cells

## 2022-02-15 NOTE — NURSING NOTE
Comprehensive Intake Entered On:  5/28/2019 2:43 PM CDT    Performed On:  5/28/2019 2:41 PM CDT by Trinidad Ward               Summary   Chief Complaint :   Tick removed Sunday night.    Weight Measured :   155.6 lb(Converted to: 155 lb 10 oz, 70.58 kg)    Systolic Blood Pressure :   124 mmHg   Diastolic Blood Pressure :   78 mmHg   Mean Arterial Pressure :   93 mmHg   Peripheral Pulse Rate :   73 bpm   Temperature Tympanic :   97.9 DegF(Converted to: 36.6 DegC)    Oxygen Saturation :   98 %   Trinidad Ward - 5/28/2019 2:41 PM CDT   Health Status   Allergies Verified? :   Yes   Medication History Verified? :   Yes   Tobacco Use? :   Never smoker   Trinidad Ward - 5/28/2019 2:41 PM CDT   Meds / Allergies   (As Of: 5/28/2019 2:43:08 PM CDT)   Allergies (Active)   HYDROcodone  Estimated Onset Date:   Unspecified ; Reactions:   nausea, Nausea & vomiting. ; Created By:   Macario Frances MD; Reaction Status:   Active ; Category:   Drug ; Substance:   HYDROcodone ; Type:   Intolerance ; Updated By:   Macario Frances MD; Reviewed Date:   6/28/2016 4:59 PM CDT      oxyCODONE  Estimated Onset Date:   <not entered> 8/3/2012 ; Reactions:   Nausea ; Created By:   Macario Frances MD; Reaction Status:   Active ; Category:   Drug ; Substance:   oxyCODONE ; Type:   Side Effect ; Updated By:   Macario Frances MD; Reviewed Date:   6/28/2016 4:59 PM CDT        Medication List   (As Of: 5/28/2019 2:43:08 PM CDT)   Prescription/Discharge Order    lisinopril  :   lisinopril ; Status:   Prescribed ; Ordered As Mnemonic:   lisinopril 40 mg oral tablet ; Simple Display Line:   40 mg, 1 tab(s), po, daily, for 90 day(s), 90 tab(s), 3 Refill(s) ; Ordering Provider:   Macario Frances MD; Catalog Code:   lisinopril ; Order Dt/Tm:   4/29/2019 12:42:00 PM          hydroCHLOROthiazide  :   hydroCHLOROthiazide ; Status:   Prescribed ; Ordered As Mnemonic:   hydroCHLOROthiazide 12.5 mg oral capsule ; Simple Display Line:   12.5 mg, 1  cap(s), po, daily, for 90 day(s), 90 cap(s), 3 Refill(s) ; Ordering Provider:   Macario Frances MD; Catalog Code:   hydroCHLOROthiazide ; Order Dt/Tm:   4/29/2019 12:41:59 PM

## 2022-02-15 NOTE — NURSING NOTE
Pt called stating he was told there were no refills on his Lisinopril. This was to be filled for a year. Changed in the chart and informed pt.

## 2022-02-15 NOTE — PROGRESS NOTES
Patient:   CHANA BENITEZ            MRN: [id number]            FIN: [id number]               Age:   76 years     Sex:  Male     :  1945   Associated Diagnoses:   Dupuytren's contracture of left hand; Digital mucinous cyst of finger   Author:   Jason Kelley MD      Visit Information      Date of Service: 2021 11:16 am  Performing Location: [facility]  Encounter#: [id number]      Primary Care Provider (PCP):  Macario Frances MD    NPI# [id number]      Referring Provider:  Jason Kelley MD    NPI# [id number]      Chief Complaint   2021 11:20 AM CDT   Left thumb pain, possible cyst.        History of Present Illness   Patient is here for cyst on finger.  Cyst over the dorsal aspect of his left thumb distally.  Getting bigger.  He also has Dupuytren s contracture on that hand.         Review of Systems   Constitutional:  Negative except as documented in history of present illness.    Musculoskeletal:  Negative except as documented in history of present illness.    Integumentary:  Negative except as documented in history of present illness.    Neurologic:  Negative.       Health Status   Allergies:    Nonallergic Reactions (Selected)  Severity Not Documented  HYDROcodone (Nausea and nausea & vomiting.)  OxyCODONE (Nausea)   Medications:  (Selected)   Prescriptions  Prescribed  hydroCHLOROthiazide 12.5 mg oral capsule: = 1 cap(s) ( 12.5 mg ), po, daily, # 90 cap(s), 0 Refill(s), Type: Soft Stop, Pharmacy: [facility], 1 cap(s) Oral daily,x90 day(s)  lisinopril 40 mg oral tablet: = 1 tab(s) ( 40 mg ), po, daily, # 90 tab(s), 0 Refill(s), Type: Soft Stop, Pharmacy: [facility], 1 tab(s) Oral daily,x90 day(s)   Problem list:    All Problems (Selected)  Agatston coronary artery calcium score less than 100 / SNOMED CT 6807817666 / Confirmed  Family history of heart disease / SNOMED CT [id number] / Confirmed  H/O babesiosis / SNOMED CT 3672105998 /  Confirmed  Dyslipidemia, goal LDL below 130 / SNOMED CT 96440090 / Confirmed  Hypertension / SNOMED CT 6606031696 / Confirmed  Palpitations / SNOMED CT 868293974 / Confirmed  Seasonal allergies / SNOMED CT 682653329 / Confirmed  Hearing loss in left ear / SNOMED CT 159527215 / Confirmed      Histories   Past Medical History:    Active  Hypertension (7208726475)  Dyslipidemia, goal LDL below 130 (74901080)  Comments:  4/15/2014 CDT 11:01 AM Macario Callejas MD  10 year risk 14.8%. Moderate to high intensity statin indicated.    2017 CDT 2:49 PM Macario Callejas MD  10 year estimated risk 21%    2018 CDT 10:27 AM Macario Callejas MD  10 year estimated risk 17.5% improved to 14.1% with optimal care  Agatston coronary artery calcium score less than 100 (4897452410)  Comments:  10/16/2015 CDT 8:45 AM Macario Callejas MD  62  Hearing loss in left ear (146258057)  Seasonal allergies (159809973)  Palpitations (915430680)   Family History:    Hypertension  Mother (): onset at 60 .  Hypothyroidism  Mother ()  Daughter (Aisha)  Myocardial infarction  Father ()  Tobacco abuse  Father ()  MI - Myocardial infarction  Father ()  Comments:  2013 5:04 PM CST - Petty Raymond  First MI late 40s.     Procedure history:    Cardiac computed tomography for calcium scoring (SNOMED CT 8353341741) on 3/25/2014 at 68 Years.  Comments:  2017 3:44 PM CDT - Marya Morrison  Total Agatston calcium score is 62  Colonoscopy (SNOMED CT 157322561) on 2011 at 66 Years.  Comments:  2011 12:58 PM Macario Callejas MD  Negative. Repeat in 10 years.  Knee Arthrosopy in  at 62 Years.  Colonoscopy (SNOMED CT 694788031) on 3/15/1996 at 50 Years.  Comments:  3/31/2011 9:45 AM Macario Callejas MD  normal  Rectal biopsy (SNOMED CT 990228367) on 3/15/1996 at 50 Years.  Appendectomy (SNOMED CT 341553223) in  at 15 Years.   Social History:        Electronic  Cigarette/Vaping Assessment            Electronic Cigarette Use: Never.      Alcohol Assessment: Current            Current, Daily, 2 drinks/episode average.  2 drinks/episode maximum.      Tobacco Assessment            Never (less than 100 in lifetime)      Substance Abuse Assessment: Denies Substance Abuse      Employment and Education Assessment            Employed, Work/School description: .      Home and Environment Assessment            Marital status: .  Spouse/Partner name: Alona Simons.  Living situation: Home/Independent.      Exercise and Physical Activity Assessment: Regular exercise        Physical Examination   Vital Signs   5/12/2021 11:20 AM CDT Temperature Tympanic 97.8 DegF  LOW    Peripheral Pulse Rate 62 bpm    HR Method Electronic    Systolic Blood Pressure 159 mmHg  HI    Diastolic Blood Pressure 95 mmHg  HI    Mean Arterial Pressure 116 mmHg    BP Method Electronic      Measurements from flowsheet : Measurements   5/12/2021 11:20 AM CDT   Weight Measured - Standard                158.5 lb     Musculoskeletal:  Mucous cyst over the dorsal distal phalanx left thumb CMS otherwise intact Dupuytren's contracture same hand   .    Neurologic:  Alert, Oriented.       Impression and Plan   Diagnosis     Dupuytren's contracture of left hand (QTQ68-PU M72.0).     Digital mucinous cyst of finger (QEO12-KT M67.449).     Plan:  Patient with mucous cyst of finger along with Dupuytren's contracture we will send him to [facility] hand  .

## 2022-02-15 NOTE — TELEPHONE ENCOUNTER
---------------------  From: Jennifer Rangel CMA (Phone Messages Pool ([id number]_[facility]))   To: GAURANG BENITEZ    Sent: 5/30/2019 10:49:35 AM CDT  Subject: RE: test result     Colt MerlosFrankie wanted to let you know that your lyme titer was negative but because of your rash he would like you to finish the Rx of Doxycycline. Please let us know if any other symptoms develop.     Jennifer Lim  ---------------------  From: GAURANG BENITEZ  To[facility]  Sent: 05/30/2019 10:07 a.m. CDT  Subject: test result  I received a phone message regarding the results of a recent blood test.  I may be hard to reach today, so if you can get me the results through your messaging system, please do.    The phone number to be used for me is [phone].    Thanks.    Gaurang Benitez  [birth year]Returned Call  Time: 11:30 am  Note:  Called & notified pt of the results as well.

## 2022-02-15 NOTE — NURSING NOTE
Placed LifeWatch holter @ 1520 per [facility] for dx of palpitations. Gave patient written & verbal instructions and sent patient home with all LifeWatch supplies. Patient indicated he understood. End of service date is 5/31/19.Printed 4/30/19 Holter Monitor Report and routed to [facility] for interpretation.  5/1/19 to 5/30/19 MCT-3 Event Monitor pending.Printed and routed MCT 3 Lead ACT End of Service Report to [facility] for interpretation.

## 2022-02-15 NOTE — PROGRESS NOTES
Patient:   CHANA BENITEZ            MRN: [id number]            FIN: [id number]               Age:   74 years     Sex:  Male     :  1945   Associated Diagnoses:   Tick bite   Author:   Bubba DAY, Frankie      Report Summary   Diagnosis  Tick bite ([id number]-ZB W57.XXXA).  Patient InstructionsOrders   Visit Information   Visit type:  New symptom.    Accompanied by:  No one.    Source of history:  Self.    Referral source:  Self.    History limitation:  None.       Chief Complaint   2019 2:41 PM CDT    Tick removed Ray night.        History of Present Illness             The patient presents with tick bite(s).  The location of the bite(s) is left.  The bite(s) is described as red.  The severity of symptom(s) related to the bite(s) is mild.  The bite symptom(s) is constant.  The symptom(s) related to the bite(s) has lasted for 3 day(s).  The context of the bite(s): occurred while outdoors.  Associated symptoms consist of rash.  Deer tick bite. Noticed this weekend. Wife removed the tick. Now has erythematous rash.  CC above noted and confirmed with the patient..        Review of Systems   Constitutional:  Negative.    Respiratory:  Negative.       Health Status   Allergies:    Nonallergic Reactions (All)  Severity Not Documented  HYDROcodone (Nausea and nausea & vomiting.)  OxyCODONE (Nausea)  Canceled/Inactive Reactions (All)  No known allergies   Problem list:    All Problems (Selected)  Seasonal allergies / SNOMED CT 183051385 / Confirmed  Palpitations / SNOMED CT 576355141 / Confirmed  Hypertension / SNOMED CT 9031999633 / Confirmed  Hearing loss in left ear / SNOMED CT 938852835 / Confirmed  Family history of heart disease / SNOMED CT [id number] / Confirmed  Dyslipidemia, goal LDL below 130 / SNOMED CT 78461673 / Confirmed  Agatston coronary artery calcium score less than 100 / SNOMED CT 0766307040 / Confirmed   Medications:  (Selected)   Prescriptions  Prescribed  doxycycline  hyclate 100 mg oral tablet: = 1 tab(s) ( 100 mg ), PO, bid, x 10 day(s), Instructions: May fill with cheapest Doxy, # 20 tab(s), 0 Refill(s), Type: Acute, Pharmacy: [facility], 1 tab(s) Oral bid,x10 day(s),Instr:May fill with cheapest Doxy  hydroCHLOROthiazide 12.5 mg oral capsule: = 1 cap(s) ( 12.5 mg ), po, daily, # 90 cap(s), 3 Refill(s), Type: Soft Stop, Pharmacy: [facility], 1 cap(s) Oral daily,x90 day(s)  lisinopril 40 mg oral tablet: = 1 tab(s) ( 40 mg ), po, daily, # 90 tab(s), 3 Refill(s), Type: Soft Stop, Pharmacy: [facility], 1 tab(s) Oral daily,x90 day(s)      Histories   Past Medical History:    Active  Hypertension (5998728683)  Dyslipidemia, goal LDL below 130 (35242950)  Comments:  4/15/2014 CDT 11:01 AM Macario Callejas MD  10 year risk 14.8%. Moderate to high intensity statin indicated.    2017 CDT 2:49 PM Macario Callejas MD  10 year estimated risk 21%    2018 CDT 10:27 AM Macario Callejas MD  10 year estimated risk 17.5% improved to 14.1% with optimal care  Agatston coronary artery calcium score less than 100 (4644336647)  Comments:  10/16/2015 CDT 8:45 AM Macario Callejas MD  62  Hearing loss in left ear (707678870)  Seasonal allergies (188701243)  Palpitations (455000158)   Family History:    Hypertension  Mother (): onset at 60 .  Hypothyroidism  Mother ()  Daughter (Aisha)  Myocardial infarction  Father ()  Tobacco abuse  Father ()  MI - Myocardial infarction  Father ()  Comments:  2013 5:04 PM Petty Loya  First MI late 40s.        Physical Examination   Vital Signs   2019 2:41 PM CDT Temperature Tympanic 97.9 DegF    Peripheral Pulse Rate 73 bpm    Systolic Blood Pressure 124 mmHg    Diastolic Blood Pressure 78 mmHg    Mean Arterial Pressure 93 mmHg    Oxygen Saturation 98 %      Measurements from flowsheet : Measurements   2019 2:41 PM CDT    Weight Measured - Standard                155.6 lb      General:  Alert and oriented, No acute distress.    Integumentary:  Circular, erythematous rash with central clearing on the left superior, medial thigh. No retained tick parts noted..    Neurologic:  Alert, Oriented.       Impression and Plan   Diagnosis     Tick bite (MPH01-SP W57.XXXA).     Patient Instructions:       Counseled: Patient, Regarding diagnosis, Regarding medications, Activity, Verbalized understanding.    Orders     Orders (Selected)   Outpatient Orders  Ordered (Dispatched)  Lyme disease antibody, total, eia with reflex to western blot (igg,igm)* ([facility]): Specimen Type: Serum, Collection Date: 05/28/19 14:49:00 CDT  Prescriptions  Prescribed  doxycycline hyclate 100 mg oral tablet: = 1 tab(s) ( 100 mg ), PO, bid, x 10 day(s), Instructions: May fill with cheapest Doxy, # 20 tab(s), 0 Refill(s), Type: Acute, Pharmacy: [facility], 1 tab(s) Oral bid,x10 day(s),Instr:May fill with cheapest Doxy.     Diagnosis   Follow as we discussed.

## 2022-02-15 NOTE — NURSING NOTE
Comprehensive Intake Entered On:  12/16/2021 9:45 AM CST    Performed On:  12/16/2021 9:40 AM CST by Judi Corrigan               Summary   Chief Complaint :   HTN f/u   Advance Directive :   Yes   Weight Measured :   158 lb(Converted to: 158 lb 0 oz, 71.668 kg)    Height Measured :   69 in(Converted to: 5 ft 9 in, 175.26 cm)    Body Mass Index :   23.33 kg/m2   Body Surface Area :   1.87 m2   Systolic Blood Pressure :   156 mmHg (HI)    Diastolic Blood Pressure :   90 mmHg (HI)    Mean Arterial Pressure :   112 mmHg   Peripheral Pulse Rate :   66 bpm   Temperature Tympanic :   96.4 DegF(Converted to: 35.8 DegC)  (LOW)    Judi Corrigan - 12/16/2021 9:40 AM CST   Health Status   Allergies Verified? :   Yes   Medication History Verified? :   Yes   Medical History Verified? :   Yes   Pre-Visit Planning Status :   Completed   Tobacco Use? :   Never smoker   Judi Corrigan - 12/16/2021 9:40 AM CST   Consents   Consent for Immunization Exchange :   Consent Granted   Consent for Immunizations to Providers :   Consent Granted   Judi Corrigan - 12/16/2021 9:40 AM CST   Meds / Allergies   (As Of: 12/16/2021 9:46:00 AM CST)   Allergies (Active)   HYDROcodone  Estimated Onset Date:   Unspecified ; Reactions:   nausea, Nausea & vomiting. ; Created By:   Macario Frances MD; Reaction Status:   Active ; Category:   Drug ; Substance:   HYDROcodone ; Type:   Intolerance ; Updated By:   Macario Frances MD; Reviewed Date:   12/16/2021 9:42 AM CST      oxyCODONE  Estimated Onset Date:   <not entered> 8/3/2012 ; Reactions:   Nausea ; Created By:   Macario Frances MD; Reaction Status:   Active ; Category:   Drug ; Substance:   oxyCODONE ; Type:   Side Effect ; Updated By:   Macario Frances MD; Reviewed Date:   12/16/2021 9:42 AM CST        Medication List   (As Of: 12/16/2021 9:46:00 AM CST)   Prescription/Discharge Order    lisinopril  :   lisinopril ; Status:   Prescribed ; Ordered As Mnemonic:   lisinopril 40 mg oral  tablet ; Simple Display Line:   1 tab(s), Oral, daily, 90 tab(s), 1 Refill(s) ; Ordering Provider:   Jason Kelley MD; Catalog Code:   lisinopril ; Order Dt/Tm:   12/9/2021 10:52:54 AM CST          hydroCHLOROthiazide  :   hydroCHLOROthiazide ; Status:   Prescribed ; Ordered As Mnemonic:   hydroCHLOROthiazide 12.5 mg oral capsule ; Simple Display Line:   1 cap(s), Oral, daily, dose decrease., 90 cap(s), 1 Refill(s) ; Ordering Provider:   Jason Kelley MD; Catalog Code:   hydroCHLOROthiazide ; Order Dt/Tm:   12/9/2021 10:52:30 AM CST          triamcinolone topical  :   triamcinolone topical ; Status:   Prescribed ; Ordered As Mnemonic:   triamcinolone 0.1% topical cream ; Simple Display Line:   1 ariel, Topical, tid, apply a thin film  to affected area, 60 gm, 6 Refill(s) ; Ordering Provider:   Jason Kelley MD; Catalog Code:   triamcinolone topical ; Order Dt/Tm:   12/9/2021 10:37:31 AM CST          tamsulosin  :   tamsulosin ; Status:   Prescribed ; Ordered As Mnemonic:   tamsulosin 0.4 mg oral capsule ; Simple Display Line:   1 cap(s), Oral, daily, 90 EA, 3 Refill(s) ; Ordering Provider:   Jason Kelley MD; Catalog Code:   tamsulosin ; Order Dt/Tm:   7/21/2021 9:31:29 AM CDT            Home Meds    timolol  :   timolol ; Status:   Documented ; Ordered As Mnemonic:   timolol ; Simple Display Line:   Oral, daily, Rx'd by Opthamology, 0 Refill(s) ; Catalog Code:   timolol ; Order Dt/Tm:   6/15/2021 1:39:22 PM CDT

## 2022-02-15 NOTE — TELEPHONE ENCOUNTER
---------------------  From: Domitila Crawford LPN   To: Phone Messages Pool ([id number]_[facility]); GAURANG BENITEZ    Sent: 11/18/2019 11:16:22 AM CST  Subject: General Message     Gaurang,   One of out other Providers advised that you be seen for an appointment for further evaluation. Please call us at [phone] and schedule and appointment.   Domitila Crawford LPN

## 2022-02-15 NOTE — LETTER
(Inserted Image. Unable to display)   April 30, 2019      CHANA BENITEZ       [address] [place], [place] [id number]        Dear CHANA,    Thank you for selecting [facility] ([facility]viously [facility] for your healthcare needs.  Below you will find the results of your recent tests done at our clinic.     Normal results      Result Name Current Result Previous Result Reference Range   Sodium Level (mmol/L)  137 4/29/2019  141 4/22/2019 135 - 146   Potassium Level (mmol/L)  3.9 4/29/2019  4.3 4/22/2019 3.5 - 5.3   Chloride Level (mmol/L)  101 4/29/2019  105 4/22/2019 98 - 110   CO2 Level (mmol/L)  28 4/29/2019  30 4/22/2019 20 - 32   Glucose Level (mg/dL)  85 4/29/2019  91 4/22/2019 65 - 99   BUN (mg/dL)  21 4/29/2019  16 4/22/2019 7 - 25   Creatinine Level (mg/dL)  0.98 4/29/2019  0.90 4/22/2019 0.70 - 1.18   eGFR (mL/min/1.73m2)  76 4/29/2019  84 4/22/2019 > OR = 60 -    Calcium Level (mg/dL)  9.3 4/29/2019  9.2 4/22/2019 8.6 - 10.3   TSH (mIU/L)  2.38 4/29/2019  0.40 - 4.50   Hgb (gm/dL)  14.1 4/29/2019  13.2 - 17.1       Please contact me or my assistant at [phone] if you have any questions.     Sincerely,        Macario Frances MD

## 2022-02-15 NOTE — TELEPHONE ENCOUNTER
"Entered by Ede Nair CMA on May 07, 2021 2:52:37 PM CDT  I called pt and he states he last got these medications filled at [facility] during Px with them last year.  Pt states he was looking for a temporary refill(2 days) because he is in [facility] and forgot his prescription, but states he wanted to request to go through his Doctor through [facility].  I did tell him that i could give a protocol fill but if he needed more he would need to make an appt with ELIZABETH(LOV  1/6/20 tick bite)  Pt voiced understanding but wants to call and get Rx's from [facility].  He will call back if he cant get ahold of them        ** Patient matched by Ede Nair CMA on 5/7/2021 2:33:07 PM CDT **      ------------------------------------------  From: marshallindex  To: Macario Frances MD  Sent: May 7, 2021 2:32:15 PM CDT  Subject: Medication Management  Due: April 27, 2021 12:41:43 AM CDT     ** On Hold Pending Signature **     Drug: lisinopril (lisinopril 40 mg oral tablet), TAKE ONE TABLET BY MOUTH ONCE DAILY $12  Quantity: 90 EA  Days Supply: 90  Refills: 3  Substitutions Allowed  Notes from Pharmacy:     Dispensed Drug: lisinopril (lisinopril 40 mg oral tablet), TAKE ONE TABLET BY MOUTH ONCE DAILY $12  Quantity: 90 EA  Days Supply: 90  Refills: 3  Substitutions Allowed  Notes from Pharmacy:     ** On Hold Pending Signature **     Drug: lisinopril (lisinopril 40 mg oral tablet), TAKE ONE TABLET BY MOUTH ONCE DAILY $12  Quantity: 90 EA  Days Supply: 90  Refills: 3  Substitutions Allowed  Notes from Pharmacy:     Dispensed Drug: lisinopril (lisinopril 40 mg oral tablet), TAKE ONE TABLET BY MOUTH ONCE DAILY $12  Quantity: 90 EA  Days Supply: 90  Refills: 3  Substitutions Allowed  Notes from Pharmacy:  ------------------------------------------  ** Not Approved: Patient needs appointment **  lisinopril (LISINOPRIL 40MG TABLET)  TAKE ONE TABLET BY MOUTH ONCE DAILY    $12  Qty:  90 EA        Days Supply:  90        Refills:  3        "   Substitutions Allowed     Route To Pharmacy - [facility]   Signed by Ede Nair CMA

## 2022-02-15 NOTE — PROGRESS NOTES
Patient:   CHANA BENITEZ            MRN: [id number]            FIN: [id number]               Age:   73 years     Sex:  Male     :  1945   Associated Diagnoses:   Agatston coronary artery calcium score less than 100; Dyslipidemia, goal LDL below 130; Family history of heart disease; Hearing loss in left ear; Hypertension; Seasonal allergies; Wellness examination; Palpitations   Author:   Macario Frances MD      Visit Information   Visit type:  Annual exam.    Accompanied by:  No one.    Referral source:  Self.    History limitation:  None.       Chief Complaint   2019 11:53 AM CDT   AWV      History of Present Illness   The patient is a generally healthy 73 year old with a history of hypertension, family history of coronary artery disease, Agatston calcium score in the 60s, dyslipidemia, chronic left hearing loss and tinnitus which has previously been evaluated, and rhinitis.  Generally he is very happy with his health.  He had three nights in a row one week ago where he had brief, a minute or two, episodes of palpitation during the night, a sense of heart fluttering.  He has hypertension and nocturia twice per night.  Complete review of systems is otherwise negative.  GDS short form score is 0.  There is nothing worrisome on the health risk assessment form.         Review of Systems   See HPI          Health Status   Allergies:    Nonallergic Reactions (Selected)  Severity Not Documented  HYDROcodone (Nausea and nausea & vomiting.)  OxyCODONE (Nausea)   Medications:  (Selected)   Prescriptions  Prescribed  hydroCHLOROthiazide 12.5 mg oral capsule: 1 cap(s) ( 12.5 mg ), po, daily, # 90 cap(s), 3 Refill(s), Type: Soft Stop, Pharmacy: [facility], 1 cap(s) po daily,x90 day(s)  lisinopril 40 mg oral tablet: 1 tab(s) ( 40 mg ), po, daily, # 90 tab(s), 3 Refill(s), Type: Soft Stop, Pharmacy: [facility], 1 tab(s) po daily,x90 day(s),    Medications          *denotes recorded medication           hydroCHLOROthiazide 12.5 mg oral capsule: 12.5 mg, 1 cap(s), po, daily, for 90 day(s), 90 cap(s), 3 Refill(s).          lisinopril 40 mg oral tablet: 40 mg, 1 tab(s), po, daily, for 90 day(s), 90 tab(s), 3 Refill(s).     Problem list:    All Problems  Agatston coronary artery calcium score less than 100 / SNOMED CT 0720257806 / Confirmed  Dyslipidemia, goal LDL below 130 / SNOMED CT 51791685 / Confirmed  Family history of heart disease / SNOMED CT [id number] / Confirmed  Hearing loss in left ear / SNOMED CT 745020305 / Confirmed  Hypertension / SNOMED CT 8605168461 / Confirmed  Seasonal allergies / SNOMED CT 001643593 / Confirmed  Inactive: Low back pain / SNOMED CT 476817451      Histories   Past Medical History:    Active  Hypertension (6657973514)  Seasonal allergies (264907087)   Family History:    Hypertension  Mother ()  Hypothyroidism  Mother ()  Daughter (Aisha)  Myocardial infarction  Father ()  Tobacco abuse  Father ()  MI - Myocardial infarction  Father ()  Comments:  2013 5:04 PM ROBIN - Petty Raymond  First MI late 40s.     Procedure history:    Cardiac computed tomography for calcium scoring (9767750441) on 3/25/2014 at 68 Years.  Comments:  2017 3:44 PM CDT - Marya Morrison  Total Agatston calcium score is 62  Colonoscopy (674026920) on 2011 at 66 Years.  Comments:  2011 12:58 PM Macario Callejas MD  Negative. Repeat in 10 years.  Knee Arthrosopy in  at 62 Years.  Colonoscopy (447528753) on 3/15/1996 at 50 Years.  Comments:  3/31/2011 9:45 AM Macario Callejas MD  normal  Rectal biopsy (506153777) on 3/15/1996 at 50 Years.  Appendectomy (875180882) in  at 15 Years.   Social History:        Alcohol Assessment: Current            Current, Daily, 2 drinks/episode average.  2 drinks/episode maximum.      Tobacco Assessment: Denies Tobacco Use            Never smoker      Substance Abuse Assessment: Denies  Substance Abuse      Employment and Education Assessment            Employed, Work/School description: .      Home and Environment Assessment            Marital status: .  Spouse/Partner name: Alona Simons.      Exercise and Physical Activity Assessment: Regular exercise      Physical Examination   Vital Signs   4/29/2019 12:15 PM CDT Systolic Blood Pressure 136 mmHg  HI    Diastolic Blood Pressure 76 mmHg    Mean Arterial Pressure 96 mmHg    BP Site Right arm    BP Method Manual   4/29/2019 11:53 AM CDT Peripheral Pulse Rate 70 bpm    Systolic Blood Pressure 145 mmHg  HI    Diastolic Blood Pressure 87 mmHg  HI    Mean Arterial Pressure 106 mmHg    BP Site Right arm      Measurements from flowsheet : Measurements   4/29/2019 11:53 AM CDT Height Measured - Standard 69 in    Weight Measured - Standard 159 lb    BSA 1.87 m2    Body Mass Index 23.48 kg/m2      General:  Alert and oriented, No acute distress.    Eye:  Normal conjunctiva.    HENT:  Normocephalic, Normal hearing, Oral mucosa is moist, No pharyngeal erythema, No sinus tenderness.    Neck:  Supple, Non-tender, No carotid bruit, No lymphadenopathy, No thyromegaly.    Respiratory:  Lungs are clear to auscultation, Respirations are non-labored.    Cardiovascular:  Normal rate, Regular rhythm, No murmur, No gallop, Good pulses equal in all extremities, Normal peripheral perfusion, No edema.    Gastrointestinal:  Soft, Non-tender, Non-distended, No organomegaly.    Genitourinary:       Prostate: Exam declined after a discussion. Negative Evaluation at [facility] 2017.    Lymphatics:  No lymphadenopathy neck, axilla, groin.    Musculoskeletal:  No deformity, Normal gait.    Integumentary:  No pallor.    Neurologic:  Normal sensory, Normal motor function, No focal deficits, Cranial Nerves II-XII are grossly intact.    Cognition and Speech:  Speech clear and coherent, Functional cognition intact.    Psychiatric:  Cooperative, Appropriate mood & affect,  Normal judgment, Non-suicidal.       Review / Management   Results review:  Lab results   4/22/2019 8:24 AM CDT Sodium Level 141 mmol/L    Potassium Level 4.3 mmol/L    Chloride Level 105 mmol/L    CO2 Level 30 mmol/L    Glucose Level 91 mg/dL    BUN 16 mg/dL    Creatinine Level 0.90 mg/dL    BUN/Creat Ratio NOT APPLICABLE    eGFR 84 mL/min/1.73m2    eGFR African American 98 mL/min/1.73m2    Calcium Level 9.2 mg/dL    Cholesterol 203 mg/dL  HI    Non-HDL Cholesterol 117    HDL 86 mg/dL    Cholesterol/HDL Ratio 2.4    LDL 96    Triglyceride 110 mg/dL    PSA 0.9 ng/mL   .       Impression and Plan   Diagnosis     Agatston coronary artery calcium score less than 100 (HYL53-DX R93.1).     Dyslipidemia, goal LDL below 130 (LBU21-KD E78.5).     Family history of heart disease (NTO32-US Z82.49).     Hearing loss in left ear (MYV02-XE H91.92).     Hypertension (UET30-QP I10).     Seasonal allergies (WMG85-YV J30.2).     Wellness examination (SJQ32-XD Z00.00).     Patient Instructions:       Counseled: Patient, Diet, Activity, Verbalized understanding.    Summary:  BP above goal and 10 year CV risk >17% . Recheck BP. Declines statin..    Orders     Orders (Selected)   Outpatient Orders  Ordered  Return to Clinic (Request): RFV: BP check with CSS  Return to Clinic (Request): RFV: Wellness exam, Return in 1 year  Return to Office (Request): RFV: BMP, Lipid, PSA q 12 mo  Return to Office (Request): RFV: Colonoscopy in 10 years  Prescriptions  Prescribed  hydroCHLOROthiazide 12.5 mg oral capsule: = 1 cap(s) ( 12.5 mg ), po, daily, # 90 cap(s), 3 Refill(s), Type: Soft Stop, Pharmacy: [facility], 1 cap(s) Oral daily,x90 day(s)  lisinopril 40 mg oral tablet: = 1 tab(s) ( 40 mg ), po, daily, # 90 tab(s), 3 Refill(s), Type: Soft Stop, Pharmacy: [facility], 1 tab(s) Oral daily,x90 day(s).     Will call if he wishes to start a statin.     Annual Flu.     Diagnosis     Palpitations (UUM55-QO R00.2).     Course:  no recurrence.     Orders     Orders (Selected)   Outpatient Orders  Ordered  24 Hour Holter Monitor (Request): Instructions: with 30 day mobile cardiac telemtry for palpitions suspicions for PAF, Palpitations  Ordered (In Transit)  Hemoglobin* ([facility]): Specimen Type: Blood, Collection Date: 04/29/19 12:16:00 CDT  TSH* ([facility]): Specimen Type: Serum, Collection Date: 04/29/19 12:16:00 CDT.

## 2022-02-15 NOTE — PROGRESS NOTES
Patient:   CHANA BENITEZ            MRN: [id number]            FIN: [id number]               Age:   74 years     Sex:  Male     :  1945   Associated Diagnoses:   Embedded tick of thigh; Cyst, arachnoid; Bad headache   Author:   Jason Kelley MD      Visit Information      Date of Service: 10/30/2019 01:07 pm  Performing Location: [facility]  Encounter#: [id number]      Primary Care Provider (PCP):  Macario Frances MD    NPI# [id number]      Referring Provider:  Jason Kelley MD    NPI# [id number]      Chief Complaint   10/30/2019 1:12 PM CDT   Possible tick on scrotum.        History of Present Illness   chief complaint and symptoms as noted above confirmed with patient noticed this am  no new sxs  chronic am headache several yrs takes apap  no balance issues  other than the loss of taste/smell no other neuro sxs      Review of Systems   Constitutional:  Negative except as documented in history of present illness.    Eye:  Negative.    Ear/Nose/Mouth/Throat:  Negative except as documented in history of present illness.    Musculoskeletal:  Negative.    Integumentary:  Negative except as documented in history of present illness.    Neurologic:  Negative except as documented in history of present illness.       Health Status   Allergies:    Nonallergic Reactions (Selected)  Severity Not Documented  HYDROcodone (Nausea and nausea & vomiting.)  OxyCODONE (Nausea)   Medications:  (Selected)   Prescriptions  Prescribed  Flonase 50 mcg/inh nasal spray: = 2 spray(s), Nasal, daily, # 1 EA, 6 Refill(s), Type: Maintenance, Pharmacy: [facility], 2 spray(s) Nasal daily  doxycycline monohydrate 100 mg oral capsule: See Instructions, Instructions: take 2 tabs po once for tick bite, # 20 EA, 0 Refill(s), Type: Maintenance, Pharmacy: [facility], take 2 tabs po once for tick bite  hydroCHLOROthiazide 12.5 mg oral capsule: = 1 cap(s) ( 12.5 mg ), po, daily, # 90 cap(s), 3 Refill(s), Type: Soft Stop,  Pharmacy: [facility], 1 cap(s) Oral daily,x90 day(s)  lisinopril 40 mg oral tablet: = 1 tab(s) ( 40 mg ), po, daily, # 90 tab(s), 3 Refill(s), Type: Soft Stop, Pharmacy: [facility], 1 tab(s) Oral daily,x90 day(s),    Medications          *denotes recorded medication          doxycycline monohydrate 100 mg oral capsule: See Instructions, take 2 tabs po once for tick bite, 20 EA, 0 Refill(s).          Flonase 50 mcg/inh nasal spray: 2 spray(s), Nasal, daily, 1 EA, 6 Refill(s).          hydroCHLOROthiazide 12.5 mg oral capsule: 12.5 mg, 1 cap(s), po, daily, for 90 day(s), 90 cap(s), 3 Refill(s).          lisinopril 40 mg oral tablet: 40 mg, 1 tab(s), po, daily, for 90 day(s), 90 tab(s), 3 Refill(s).       Problem list:    All Problems (Selected)  Agatston coronary artery calcium score less than 100 / SNOMED CT 9527059729 / Confirmed  Family history of heart disease / SNOMED CT [id number] / Confirmed  Dyslipidemia, goal LDL below 130 / SNOMED CT 60627133 / Confirmed  Hypertension / SNOMED CT 4889940900 / Confirmed  Palpitations / SNOMED CT 512243721 / Confirmed  Seasonal allergies / SNOMED CT 103491435 / Confirmed  Hearing loss in left ear / SNOMED CT 957849065 / Confirmed      Histories   Past Medical History:    Active  Hypertension (3251836315)  Dyslipidemia, goal LDL below 130 (41168102)  Comments:  4/15/2014 CDT 11:01 AM Macario Callejas MD  10 year risk 14.8%. Moderate to high intensity statin indicated.    2017 CDT 2:49 PM Macario Callejas MD  10 year estimated risk 21%    2018 CDT 10:27 AM Macario Callejas MD  10 year estimated risk 17.5% improved to 14.1% with optimal care  Agatston coronary artery calcium score less than 100 (9651805609)  Comments:  10/16/2015 CDT 8:45 AM CDT - Juan Daniel WHYTE, Macario  62  Hearing loss in left ear (220527908)  Seasonal allergies (009310754)  Palpitations (132216275)   Family History:    Hypertension  Mother (): onset at  60 .  Hypothyroidism  Mother ()  Daughter (Aisha)  Myocardial infarction  Father ()  Tobacco abuse  Father ()  MI - Myocardial infarction  Father ()  Comments:  2013 5:04 PM CST - Petty Raymond  First MI late 40s.     Procedure history:    Cardiac computed tomography for calcium scoring (SNOMED CT 7108628977) on 3/25/2014 at 68 Years.  Comments:  2017 3:44 PM CDT - Marya Morrison  Total Agatston calcium score is 62  Colonoscopy (SNOMED CT 784420523) on 2011 at 66 Years.  Comments:  2011 12:58 PM CDT - Macario Frances MD  Negative. Repeat in 10 years.  Knee Arthrosopy in  at 62 Years.  Colonoscopy (SNOMED CT 492016685) on 3/15/1996 at 50 Years.  Comments:  3/31/2011 9:45 AM CDT - Macario Frances MD  normal  Rectal biopsy (SNOMED CT 385272873) on 3/15/1996 at 50 Years.  Appendectomy (SNOMED CT 594150425) in  at 15 Years.   Social History:        Alcohol Assessment: Current            Current, Daily, 2 drinks/episode average.  2 drinks/episode maximum.      Tobacco Assessment: Denies Tobacco Use            Never smoker      Substance Abuse Assessment: Denies Substance Abuse      Employment and Education Assessment            Employed, Work/School description: .      Home and Environment Assessment            Marital status: .  Spouse/Partner name: Alona Simons.  Living situation: Home/Independent.      Exercise and Physical Activity Assessment: Regular exercise        Physical Examination   Vital Signs   10/30/2019 1:12 PM CDT Temperature Tympanic 97.8 DegF  LOW    Peripheral Pulse Rate 71 bpm    HR Method Electronic    Systolic Blood Pressure 136 mmHg  HI    Diastolic Blood Pressure 82 mmHg  HI    Mean Arterial Pressure 100 mmHg    BP Method Electronic      Measurements from flowsheet : Measurements   10/30/2019 1:12 PM CDT Height Measured - Standard 69 in    Weight Measured - Standard 159.4 lb    BSA 1.87 m2    Body Mass Index 23.54 kg/m2       General:  Alert and oriented, No acute distress.    Eye:  Pupils are equal, round and reactive to light, Extraocular movements are intact.    Integumentary:  imbedded tick right scrotum removed easily with sterile pick ups.    Neurologic:  Alert, Oriented, Cranial Nerves II-XII are grossly intact.       Impression and Plan   Diagnosis     Embedded tick of thigh (OKI32-IJ S70.359A).     Cyst, arachnoid (PAK70-CK G93.0).     Bad headache (DNZ85-AA R51).     Course:  Progressing as expected.    Plan:  Patient was embedded tick status post removal local care is reviewed  Reviewed will take 200 mg of doxycycline x1 wee given in addition with doxycycline for future tick bites.  We did review his MRI with him.  He does have chronic daily headaches in the morning.  He does have a large arachnoid cyst noted as well.  We will get neurology consult on whether or not this may be an issue.  Consider further work-up of the headache per neurology even consider line given her previous tick bites.  .    Patient Instructions:       Counseled: Patient, Regarding diagnosis, Regarding treatment, Regarding medications.

## 2022-02-15 NOTE — TELEPHONE ENCOUNTER
---------------------  From: Romi BIANCHI, Nicole DASILVA (Phone Messages Pool ([address] [place] [address]))   To: TFS Message Pool ([address] [place] [address]); GAURANG BENITEZ    Sent: 10/14/2019 5:10:39 PM CDT  Subject: CONSUMER MESSAGE FW: Follow-up with Dr. Allie Merlos,    Dr. Kelley will be back in clinic tomorrow. I will forward your message for him to receive then.    Thanks,  Nicole BETH LPN        ---------------------  From: GAURANG BENITEZ  To: [facility]  Sent: 10/14/2019 04:58 p.m. CDT  Subject: Follow-up with Dr. Kelley  I saw Dr. Kelley a couple of weeks ago regarding partial loss of taste/smell.  I have completed the oral medication that was prescribed, with little if any change.    I think Dr. Kelley mentioned that the next step might be a consult with an ENT specialist, but I m not sure about that.  I do want to pursue this further, whatever that next step is.    Thanks.    Gaurang Benitez---------------------  From: Charlette Silva CMA ([employer] Message Pool ([address] [place] [address]))   To: Jason Kelley MD;     Sent: 10/15/2019 9:38:00 AM CDT  Subject: FW: CONSUMER MESSAGE FW: Follow-up with Dr. Kelley---------------------  From: Jason Kelley MD   To: [employer] Message Pool ([facility]);     Sent: 10/15/2019 9:45:03 AM CDT  Subject: RE: CONSUMER MESSAGE FW: Follow-up with Dr. Kelley     yes ent consult next---------------------  From: Charlette Silva CMA ([facility] Message Pool ([address] [place] [address]))   To: GAURANG BENITEZ    Sent: 10/15/2019 10:50:44 AM CDT  Subject: RE: Follow-up with Dr. Allie Merlos,     The referral for ENT was placed today. Please expect a call from our referral department in the next day or two.     Kind Regards,    KWAKU Ovalles

## 2022-02-15 NOTE — NURSING NOTE
Comprehensive Intake Entered On:  11/1/2021 10:02 AM CDT    Performed On:  11/1/2021 9:57 AM CDT by Ede Nair CMA               Summary   Chief Complaint :   Verbal consent given for a telephone visit.  Pt states he had a positive Home covid test yesterday(10/31/21) Pt is c/o cough and fever x 5-6 days   Advance Directive :   Yes   Height Measured :   69 in(Converted to: 5 ft 9 in, 175.26 cm)    Ede Nair CMA - 11/1/2021 9:57 AM CDT   Health Status   Allergies Verified? :   Yes   Medication History Verified? :   Yes   Medical History Verified? :   Yes   Pre-Visit Planning Status :   Not completed   Tobacco Use? :   Never smoker   Ede Nair CMA - 11/1/2021 9:57 AM CDT   Meds / Allergies   (As Of: 11/1/2021 10:02:34 AM CDT)   Allergies (Active)   HYDROcodone  Estimated Onset Date:   Unspecified ; Reactions:   nausea, Nausea & vomiting. ; Created By:   Macario Frances MD; Reaction Status:   Active ; Category:   Drug ; Substance:   HYDROcodone ; Type:   Intolerance ; Updated By:   Macario Frances MD; Reviewed Date:   11/1/2021 10:01 AM CDT      oxyCODONE  Estimated Onset Date:   <not entered> 8/3/2012 ; Reactions:   Nausea ; Created By:   Macario Frances MD; Reaction Status:   Active ; Category:   Drug ; Substance:   oxyCODONE ; Type:   Side Effect ; Updated By:   Macario Frances MD; Reviewed Date:   11/1/2021 10:01 AM CDT        Medication List   (As Of: 11/1/2021 10:02:34 AM CDT)   Prescription/Discharge Order    hydroCHLOROthiazide  :   hydroCHLOROthiazide ; Status:   Prescribed ; Ordered As Mnemonic:   hydroCHLOROthiazide 12.5 mg oral capsule ; Simple Display Line:   1 cap(s), Oral, daily, dose decrease., 90 cap(s), 0 Refill(s) ; Ordering Provider:   Jason Kelley MD; Catalog Code:   hydroCHLOROthiazide ; Order Dt/Tm:   10/5/2021 12:45:22 PM CDT          lisinopril  :   lisinopril ; Status:   Prescribed ; Ordered As Mnemonic:   lisinopril 40 mg oral tablet ; Simple Display Line:   1 tab(s), Oral,  daily, 90 tab(s), 0 Refill(s) ; Ordering Provider:   Jason Kelley MD; Catalog Code:   lisinopril ; Order Dt/Tm:   10/5/2021 12:45:10 PM CDT          tamsulosin  :   tamsulosin ; Status:   Prescribed ; Ordered As Mnemonic:   tamsulosin 0.4 mg oral capsule ; Simple Display Line:   1 cap(s), Oral, daily, 90 EA, 3 Refill(s) ; Ordering Provider:   Jason Kelley MD; Catalog Code:   tamsulosin ; Order Dt/Tm:   7/21/2021 9:31:29 AM CDT            Home Meds    timolol  :   timolol ; Status:   Documented ; Ordered As Mnemonic:   timolol ; Simple Display Line:   Oral, daily, Rx'd by Opthamology, 0 Refill(s) ; Catalog Code:   timolol ; Order Dt/Tm:   6/15/2021 1:39:22 PM CDT            Social History   Social History   (As Of: 11/1/2021 10:02:34 AM CDT)   Alcohol:  Current      Current, Daily, 2 drinks/episode average.  2 drinks/episode maximum.   (Last Updated: 4/6/2017 3:51:42 PM CDT by Marya Morrison)          Tobacco:        Never (less than 100 in lifetime)   (Last Updated: 5/12/2021 11:21:10 AM CDT by Trinidad aWrd)          Electronic Cigarette/Vaping:        Electronic Cigarette Use: Never.   (Last Updated: 5/12/2021 11:21:14 AM CDT by Trinidad Ward)          Substance Abuse:  Denies Substance Abuse      (Last Updated: 12/28/2010 9:23:43 AM CST by Laura Justice )         Employment/School:        Employed, Work/School description: .   (Last Updated: 12/28/2010 9:23:52 AM CST by Laura Justice)          Home/Environment:        Marital status: .  Spouse/Partner name: Alona Simons.  Living situation: Home/Independent.   (Last Updated: 4/29/2019 2:17:37 PM CDT by Monet Salas)          Exercise:  Regular exercise      (Last Updated: 12/28/2010 9:23:54 AM CST by Laura Justice )

## 2022-02-15 NOTE — PROGRESS NOTES
Chief Complaint    f/u antibiotic from tick born illness.  History of Present Illness      Presented December 12 with a night sweats, rash, headache, cough, fever and chills and was found to have babesiosis.  Still feels a bit fatigued but the other symptoms have all resolved.  Review of Systems      No fever, chills, night sweats.  Rash is resolving.  No headache or myalgia.  No bleeding.  Physical Exam   Vitals & Measurements    T: 97.4   F (Tympanic)  HR: 65(Peripheral)  BP: 114/74     HT: 69 in  WT: 153.0 lb  BMI: 22.59       Patient appears comfortable and in no distress.  Fading maculopapular rash of the chest most prominently.  Eyes appear normal.  HEENT exam unremarkable.  Neck is supple no adenopathy or thyromegaly.  Chest clear.  Cardiac exam is regular no murmur.  No edema.  Neurologic exam nonfocal.  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  Patient return if he has recurrent symptoms.  CBC today.         Ordered:          [address] office outpatient visit 15 minutes (Charge), Quantity: 1, Babesiosis                Orders:         CBC (includes diff/plt)* ([facility]), Specimen Type: Blood, Collection Date: 01/06/20 10:31:00 CST         Return to Clinic (Request), Return in 2 weeks  Patient Information     Name:CHANA BENITEZ      Address:      [address] [id number]     Sex:Male     YOB: 1945     [phone]     Emergency Contact:KIA GE     MRN:[id number]     FIN:[id number]     Location:[facility]     Date of Service:01/06/2020      Primary Care Physician:       Macario Frances MD, [phone]      Attending Physician:       Macario Frances MD, [phone]  Problem List/Past Medical History    Ongoing     Agatston coronary artery calcium score less than 100       Comments: 62     Dyslipidemia, goal LDL below 130       Comments: 10 year estimated risk 17.5% improved to 14.1% with optimal care 10 year estimated risk 21%  10 year risk 14.8%. Moderate to high intensity statin indicated.     Family history of heart disease       Comments: Father 53     Hearing loss in left ear     Hypertension     Low back pain     Palpitations     Seasonal allergies    Historical     No qualifying data  Procedure/Surgical History     Cardiac computed tomography for calcium scoring (03/25/2014)      Comments: Total Agatston calcium score is 62.     Colonoscopy (08/17/2011)      Comments: Negative. Repeat in 10 years..     Knee Arthrosopy (2007)     Colonoscopy (03/15/1996)      Comments: normal.     Rectal biopsy (03/15/1996)     Appendectomy (1960)  Medications    hydroCHLOROthiazide 12.5 mg oral capsule, 12.5 mg= 1 cap(s), Oral, daily, 3 refills    lisinopril 40 mg oral tablet, 40 mg= 1 tab(s), Oral, daily, 3 refills  Allergies    HYDROcodone (Nausea & vomiting., nausea)    oxyCODONE (Nausea)  Social History    Smoking Status - 01/06/2020     Never smoker     Alcohol - Current, 12/28/2010      Current, Daily, 2 drinks/episode average. 2 drinks/episode maximum., 04/06/2017     Employment/School      Employed, Work/School description: ., 12/28/2010     Exercise - Regular exercise, 12/28/2010     Home/Environment      Marital status: . Spouse/Partner name: Alona Simons. Living situation: Home/Independent., 04/29/2019     Substance Abuse - Denies Substance Abuse, 12/28/2010     Tobacco - Denies Tobacco Use, 12/28/2010      Never smoker, 04/06/2017  Family History    Hypertension: Mother (Dx at 60).    Hypothyroidism: Mother and Daughter.    MI - Myocardial infarction: Father.    Myocardial infarction: Father.    Tobacco abuse: Father.  Immunizations      Vaccine Date Status          influenza virus vaccine, inactivated 10/30/2019 Given          influenza virus vaccine, inactivated 12/08/2018 Recorded          influenza 01/04/2018 Recorded          influenza virus vaccine, inactivated 01/07/2017 Recorded          Td 08/11/2016  Recorded          pneumococcal (PCV13) 08/11/2016 Recorded          influenza virus vaccine, inactivated 10/16/2015 Given          influenza virus vaccine, inactivated 10/09/2015 Recorded          pneumococcal (PCV13) 04/16/2015 Given          influenza virus vaccine, inactivated 10/31/2013 Given          influenza virus vaccine, inactivated 01/10/2013 Given          pneumococcal (PPSV23) 03/31/2011 Given          ZOS, shingles 03/31/2011 Recorded          Td 03/03/1998 Recorded          typhoid, inactivated 04/06/1995 Recorded          OPV 04/06/1995 Recorded          Td 04/06/1995 Recorded          yellow fever 04/06/1995 Recorded          Td 01/01/1988 Recorded  Lab Results          Lab Results (Last 4 results within 90 days)           Sodium Level: 136 [135 mmol/L - 145 mmol/L] (12/12/19 15:12:00)          Potassium Level: 4.7 mmol/L [3.5 mmol/L - 5.3 mmol/L] (12/18/19 09:48:00)          Potassium Level: 3.4 Low [3.5 mmol/L - 5 mmol/L] (12/12/19 15:12:00)          Chloride Level: 100 [98 mmol/L - 110 mmol/L] (12/12/19 15:12:00)          CO2 Level: 29 [21 mmol/L - 31 mmol/L] (12/12/19 15:12:00)          AGAP: 7 [5  - 18] (12/12/19 15:12:00)          Glucose Level: 105 High [65 mg/dL - 100 mg/dL] (12/12/19 15:12:00)          BUN: 15 [8 mg/dL - 25 mg/dL] (12/12/19 15:12:00)          Creatinine Level: 0.90 [0.72 mg/dL - 1.25 mg/dL] (12/12/19 15:12:00)          BUN/Creat Ratio: 17 [10  - 20] (12/12/19 15:12:00)          eGFR : >60 (12/12/19 15:12:00)          eGFR Non-: >60 (12/12/19 15:12:00)          Calcium Level: 8.7 [8.5 mg/dL - 10.5 mg/dL] (12/12/19 15:12:00)          Bilirubin Total: 1.1 [0.2 mg/dL - 1.2 mg/dL] (12/12/19 15:12:00)          Bilirubin Direct: 0.4 [0.1 mg/dL - 0.5 mg/dL] (12/12/19 15:12:00)          Bilirubin Indirect: 0.7 [0.2 mg/dL - 0.8 mg/dL] (12/12/19 15:12:00)          Alkaline Phosphatase: 118 [50 IU/L - 136 IU/L] (12/12/19 15:12:00)          AST/SGOT: 25 [2  IU/L - 40 IU/L] (12/12/19 15:12:00)          ALT/SGPT: 18 [8 IU/L - 45 IU/L] (12/12/19 15:12:00)          Protein Total: 7.0 [6 g/dL - 8 g/dL] (12/12/19 15:12:00)          Albumin Level: 3.6 [3.2 g/dL - 4.6 g/dL] (12/12/19 15:12:00)          Globulin: 3.4 [2 g/dL - 3.7 g/dL] (12/12/19 15:12:00)          A/G Ratio: 1.1 [1  - 2] (12/12/19 15:12:00)          WBC: 3.3 Low [3.8  - 10.8] (12/18/19 09:48:00)          WBC: 2.6 Low [4.5  - 11] (12/12/19 15:12:00)          RBC: 3.76 Low [4.2  - 5.8] (12/18/19 09:48:00)          RBC: 4.08 Low [4.3  - 5.9] (12/12/19 15:12:00)          Hgb: 11.4 gm/dL Low [13.2 gm/dL - 17.1 gm/dL] (12/18/19 09:48:00)          Hgb: 12.3 Low [13.5 g/dL - 17.5 g/dL] (12/12/19 15:12:00)          Hct: 32.9 % Low [38.5 % - 50 %] (12/18/19 09:48:00)          Hct: 35.6 Low [37 % - 53 %] (12/12/19 15:12:00)          MCV: 87.5 fL [80 fL - 100 fL] (12/18/19 09:48:00)          MCV: 87 [80 fL - 100 fL] (12/12/19 15:12:00)          MCH: 30.3 pg [27 pg - 33 pg] (12/18/19 09:48:00)          MCH: 30.1 [26 pg - 34 pg] (12/12/19 15:12:00)          MCHC: 34.7 gm/dL [32 gm/dL - 36 gm/dL] (12/18/19 09:48:00)          MCHC: 34.6 [32 g/dL - 36 g/dL] (12/12/19 15:12:00)          RDW: 13.2 % [11 % - 15 %] (12/18/19 09:48:00)          RDW: 13.9 [11.5 % - 15.5 %] (12/12/19 15:12:00)          Platelet: 189 [140  - 400] (12/18/19 09:48:00)          Platelet: 54 Low [140  - 440] (12/12/19 15:12:00)          MPV: 11.2 fL [7.5 fL - 12.5 fL] (12/18/19 09:48:00)          MPV: 11.5 High [6.5 fL - 11 fL] (12/12/19 15:12:00)          Metamyelocytes Man: 0.0 (12/12/19 15:12:00)          Myelocytes Man: 0.0 (12/12/19 15:12:00)          Promyelocytes Man: 0.0 (12/12/19 15:12:00)          Blasts Man: 0.0 (12/12/19 15:12:00)          Other Cells Man: 0.0 (12/12/19 15:12:00)          Lymphocytes: 32.3 % (12/18/19 09:48:00)          Lymphocytes: 22.0 (12/12/19 15:12:00)          Abs Lymphocytes: 1066 [850  - 3900] (12/18/19 09:48:00)           Abs Lymphocytes: 0.6 Low [0.9  - 2.9] (12/12/19 15:12:00)          Neutrophils: 48.9 % (12/18/19 09:48:00)          Neutrophils: 54.0 (12/12/19 15:12:00)          Abs Neutrophils: 1614 [1500  - 7800] (12/18/19 09:48:00)          Abs Neutrophils: 1.4 Low [1.7  - 7] (12/12/19 15:12:00)          Monocytes: 15.7 % (12/18/19 09:48:00)          Monocytes: 21.0 (12/12/19 15:12:00)          Abs Monocytes: 518 [200  - 950] (12/18/19 09:48:00)          Abs Monocytes: 0.5 (12/12/19 15:12:00)          Eosinophils: 2.8 % (12/18/19 09:48:00)          Eosinophils: 3.0 (12/12/19 15:12:00)          Abs Eosinophils: 92 [15  - 500] (12/18/19 09:48:00)          Abs Eosinophils: 0.1 (12/12/19 15:12:00)          Basophils: 0.3 % (12/18/19 09:48:00)          Basophils: 0.0 (12/12/19 15:12:00)          Abs Basophils: 10 [0  - 200] (12/18/19 09:48:00)          Abs Basophils: 0.0 (12/12/19 15:12:00)          Plasma Cells: 0.0 (12/12/19 15:12:00)          Platelet Estimate: Decreased (12/12/19 15:12:00)          RBC Comments: RBC morphology appears normal (12/12/19 15:12:00)          Plt Large: Present (12/12/19 15:12:00)          Anaplasma phagocytophilum IgG: <1:64 (12/12/19 16:23:00)          Anaplasma phagocytophilum IgM: <1:20 (12/12/19 16:23:00)          Anaplasma phagocytophilum Interp: See comment (12/12/19 16:23:00)          Influenza A: NEGATIVE [NEGATIVE  - NEGATIVE] (11/18/19 13:01:00)          Influenza B: NEGATIVE [NEGATIVE  - NEGATIVE] (11/18/19 13:01:00)          Lyme Ab IgG/IgM WB: <0.90 (11/18/19 12:59:00)          Anaplasma phagocytophilum DNA PCR: NOT DETECTED (12/12/19 16:23:00)

## 2022-02-15 NOTE — TELEPHONE ENCOUNTER
---------------------  From: Tiffany AMESJoya   Sent: 12/17/2019 9:57:14 AM CST  Subject: General Message-test results     Phone Message    PCP:   ELIZABETH      Time of Call:  0902       Person Calling:  self  Phone number:  [phone]    Returned call at: [phone]    Note:   states he has a tick related condition and has been in contact with inf disease at [facility] - Dr Pablo Watkins who is req a copy of his labs.  Contacted pt and offered to fax his 12/16 labs which was positive for Babesia.  Faxed lab along with 12/12 lab to [phone]

## 2022-02-15 NOTE — LETTER
(Inserted Image. Unable to display)   January 10, 2020      CHANA BENITEZ   [address], [place] [id number]        Dear CHANA,      Thank you for selecting [facility] (previously [facility]) for your healthcare needs.       Blood smear negative for parasites.          Please contact me or my assistant at [phone] you have any questions or concerns.     Sincerely,        Macario Frances MD

## 2022-02-15 NOTE — RESULTS
Patient:   CHANA BENITEZ            MRN: [id number]            FIN: [id number]               Age:   73 years     Sex:  Male     :  1945   Associated Diagnoses:   None   Author:   Macario Frances MD      Procedure   EKG procedure   Date/ Time:  2019 12:36:00 PM.     Supervised by: Macario Frances MD.     Indication: palpitations.     Position: supine.     EKG findings   Interpretation: Macario Frances MD.     Rhythm: heart rate  56  beats/min, sinus bradycardia.     Axis: normal axis, normal configuration.     Intervals: normal.     P waves: normal.     QRS complex: normal.     ST-T-U complex: normal.     Interpretation: normal EKG.        Impression and Plan   Diagnosis     Normal EKG.

## 2022-02-15 NOTE — NURSING NOTE
Quick Intake Entered On:  9/19/2019 11:57 AM CDT    Performed On:  9/19/2019 11:57 AM CDT by Gracy Paulino RN               Summary   Advance Directive :   Yes   Systolic Blood Pressure :   152 mmHg (HI)    Diastolic Blood Pressure :   90 mmHg (HI)    Mean Arterial Pressure :   111 mmHg   Gracy Paulino RN - 9/19/2019 11:57 AM CDT   More Vitals   Systolic Blood Pressure Repeat :   138 mmHg   Diastolic Blood Pressure Repeat :   88 mmHg   Gracy Paulino RN - 9/19/2019 11:57 AM CDT

## 2022-02-15 NOTE — NURSING NOTE
Comprehensive Intake Entered On:  1/6/2020 10:20 AM CST    Performed On:  1/6/2020 10:15 AM CST by Darling Oscar               Summary   Chief Complaint :   f/u antibiotic from tick born illness.    Advance Directive :   Yes   Weight Measured :   153.0 lb(Converted to: 153 lb 0 oz, 69.40 kg)    Height Measured :   69 in(Converted to: 5 ft 9 in, 175.26 cm)    Body Mass Index :   22.59 kg/m2   Body Surface Area :   1.84 m2   Systolic Blood Pressure :   114 mmHg   Diastolic Blood Pressure :   74 mmHg   Mean Arterial Pressure :   87 mmHg   Peripheral Pulse Rate :   65 bpm   BP Site :   Right arm   BP Method :   Manual   HR Method :   Electronic   Temperature Tympanic :   97.4 DegF(Converted to: 36.3 DegC)  (LOW)    Darling Oscar - 1/6/2020 10:15 AM CST   Health Status   Allergies Verified? :   Yes   Medication History Verified? :   Yes   Medical History Verified? :   Yes   Pre-Visit Planning Status :   Completed   Tobacco Use? :   Never smoker   Darling Oscar - 1/6/2020 10:15 AM CST   Consents   Consent for Immunization Exchange :   Consent Granted   Consent for Immunizations to Providers :   Consent Granted   Darling Oscar - 1/6/2020 10:15 AM CST   Meds / Allergies   (As Of: 1/6/2020 10:20:23 AM CST)   Allergies (Active)   HYDROcodone  Estimated Onset Date:   Unspecified ; Reactions:   nausea, Nausea & vomiting. ; Created By:   Macario Frances MD; Reaction Status:   Active ; Category:   Drug ; Substance:   HYDROcodone ; Type:   Intolerance ; Updated By:   Macario Frances MD; Reviewed Date:   1/6/2020 10:18 AM CST      oxyCODONE  Estimated Onset Date:   <not entered> 8/3/2012 ; Reactions:   Nausea ; Created By:   Macario Frances MD; Reaction Status:   Active ; Category:   Drug ; Substance:   oxyCODONE ; Type:   Side Effect ; Updated By:   Macario Frances MD; Reviewed Date:   1/6/2020 10:18 AM CST        Medication List   (As Of: 1/6/2020 10:20:23 AM CST)   Prescription/Discharge Order    lisinopril   :   lisinopril ; Status:   Prescribed ; Ordered As Mnemonic:   lisinopril 40 mg oral tablet ; Simple Display Line:   40 mg, 1 tab(s), po, daily, for 90 day(s), 90 tab(s), 3 Refill(s) ; Ordering Provider:   Macario Frnaces MD; Catalog Code:   lisinopril ; Order Dt/Tm:   4/29/2019 12:42:00 PM CDT          hydroCHLOROthiazide  :   hydroCHLOROthiazide ; Status:   Prescribed ; Ordered As Mnemonic:   hydroCHLOROthiazide 12.5 mg oral capsule ; Simple Display Line:   12.5 mg, 1 cap(s), po, daily, for 90 day(s), 90 cap(s), 3 Refill(s) ; Ordering Provider:   Macario Frances MD; Catalog Code:   hydroCHLOROthiazide ; Order Dt/Tm:   4/29/2019 12:41:59 PM CDT

## 2022-02-15 NOTE — NURSING NOTE
Quick Intake Entered On:  5/13/2019 10:20 AM CDT    Performed On:  5/13/2019 10:19 AM CDT by Domitila Crawford LPN               Summary   Chief Complaint :   CSS only BP check. Meds in the AM   Systolic Blood Pressure :   109 mmHg   Diastolic Blood Pressure :   74 mmHg   Mean Arterial Pressure :   86 mmHg   Peripheral Pulse Rate :   70 bpm   BP Site :   Right arm   BP Method :   Electronic   HR Method :   Electronic   Domitila Crawford LPN - 5/13/2019 10:19 AM CDT

## 2022-02-15 NOTE — NURSING NOTE
Comprehensive Intake Entered On:  12/9/2021 10:27 AM CST    Performed On:  12/9/2021 10:23 AM CST by Darling Oscar               Summary   Chief Complaint :   1. Medication refill 2. c/o rash on stomach x 2 weeks.    Advance Directive :   Yes   Weight Measured :   160.4 lb(Converted to: 160 lb 6 oz, 72.756 kg)    Height Measured :   69 in(Converted to: 5 ft 9 in, 175.26 cm)    Body Mass Index :   23.68 kg/m2   Body Surface Area :   1.88 m2   Systolic Blood Pressure :   153 mmHg (HI)    Diastolic Blood Pressure :   91 mmHg (HI)    Mean Arterial Pressure :   112 mmHg   Peripheral Pulse Rate :   60 bpm   BP Site :   Right arm   BP Method :   Electronic   Temperature Tympanic :   97.2 DegF(Converted to: 36.2 DegC)  (LOW)    Oxygen Saturation :   98 %   Darling Oscar - 12/9/2021 10:23 AM CST   Health Status   Allergies Verified? :   Yes   Medication History Verified? :   Yes   Medical History Verified? :   Yes   Pre-Visit Planning Status :   Completed   Tobacco Use? :   Never smoker   Darling Oscar - 12/9/2021 10:23 AM CST   Consents   Consent for Immunization Exchange :   Consent Granted   Consent for Immunizations to Providers :   Consent Granted   Darling Oscar - 12/9/2021 10:23 AM CST   Meds / Allergies   (As Of: 12/9/2021 10:27:13 AM CST)   Allergies (Active)   HYDROcodone  Estimated Onset Date:   Unspecified ; Reactions:   nausea, Nausea & vomiting. ; Created By:   Macario Frances MD; Reaction Status:   Active ; Category:   Drug ; Substance:   HYDROcodone ; Type:   Intolerance ; Updated By:   Macario Frances MD; Reviewed Date:   12/9/2021 10:25 AM CST      oxyCODONE  Estimated Onset Date:   <not entered> 8/3/2012 ; Reactions:   Nausea ; Created By:   Macario Frances MD; Reaction Status:   Active ; Category:   Drug ; Substance:   oxyCODONE ; Type:   Side Effect ; Updated By:   Macario Frances MD; Reviewed Date:   12/9/2021 10:25 AM CST        Medication List   (As Of: 12/9/2021 10:27:13 AM CST)    Prescription/Discharge Order    hydroCHLOROthiazide  :   hydroCHLOROthiazide ; Status:   Prescribed ; Ordered As Mnemonic:   hydroCHLOROthiazide 12.5 mg oral capsule ; Simple Display Line:   1 cap(s), Oral, daily, dose decrease., 90 cap(s), 0 Refill(s) ; Ordering Provider:   Jason Kelley MD; Catalog Code:   hydroCHLOROthiazide ; Order Dt/Tm:   10/5/2021 12:45:22 PM CDT          lisinopril  :   lisinopril ; Status:   Prescribed ; Ordered As Mnemonic:   lisinopril 40 mg oral tablet ; Simple Display Line:   1 tab(s), Oral, daily, 90 tab(s), 0 Refill(s) ; Ordering Provider:   Jason Kelley MD; Catalog Code:   lisinopril ; Order Dt/Tm:   10/5/2021 12:45:10 PM CDT          tamsulosin  :   tamsulosin ; Status:   Prescribed ; Ordered As Mnemonic:   tamsulosin 0.4 mg oral capsule ; Simple Display Line:   1 cap(s), Oral, daily, 90 EA, 3 Refill(s) ; Ordering Provider:   Jason Kelley MD; Catalog Code:   tamsulosin ; Order Dt/Tm:   7/21/2021 9:31:29 AM CDT            Home Meds    timolol  :   timolol ; Status:   Documented ; Ordered As Mnemonic:   timolol ; Simple Display Line:   Oral, daily, Rx'd by Opthamology, 0 Refill(s) ; Catalog Code:   timolol ; Order Dt/Tm:   6/15/2021 1:39:22 PM CDT

## 2022-02-15 NOTE — TELEPHONE ENCOUNTER
Entered by Natalee Johnson on Kinjal 15, 2021 5:54:33 PM CDT  DONE    ---------------------  From: Trinidad Ward   To: Appointment Pool ([id number]);     Sent: 6/15/2021 2:36:00 PM CDT  Subject: General Message     change PCP to [facility]FS

## 2022-02-15 NOTE — TELEPHONE ENCOUNTER
Entered by Jason Kelley MD on October 05, 2021 12:45:26 PM CDT  ---------------------  From: Jason Kelley MD   To: [facility]    Sent: 10/5/2021 12:45:26 PM CDT  Subject: Medication Management     ** Submitted: **  Complete:hydroCHLOROthiazide (hydroCHLOROthiazide 12.5 mg oral capsule)   Signed by Jason Kelley MD  10/5/2021 5:45:00 PM [facility]    ** Approved with modifications: **  hydroCHLOROthiazide (HYDROCHLOROTHIAZIDE 12.5MG CAPSULE)  TAKE 1 CAPSULE BY MOUTH ONCE DAILY - dose decrease  Qty:  90 cap(s)        Days Supply:  90        Refills:  0          Substitutions Allowed     Route To Pharmacy - [facility]               ** Submitted: **  Complete:lisinopril (lisinopril 40 mg oral tablet)   Signed by Jason Kelley MD  10/5/2021 5:45:00 PM [facility]    ** Approved with modifications: **  lisinopril (LISINOPRIL 40MG TABLET)  TAKE 1 TABLET BY MOUTH ONCE DAILY  Qty:  90 tab(s)        Days Supply:  90        Refills:  0          Substitutions Allowed     Route To Pharmacy - [facility]               ------------------------------------------  From: Cued  To: aJson Kelley MD  Sent: October 5, 2021 9:21:12 AM CDT  Subject: Medication Management  Due: October 2, 2021 3:21:24 PM CDT     ** On Hold Pending Signature **     Drug: hydroCHLOROthiazide (hydroCHLOROthiazide 12.5 mg oral capsule), TAKE 1 CAPSULE BY MOUTH ONCE DAILY - dose decrease  Quantity: 90 cap(s)  Days Supply: 90  Refills: 1  Substitutions Allowed  Notes from Pharmacy:     Dispensed Drug: hydroCHLOROthiazide (hydroCHLOROthiazide 12.5 mg oral capsule), TAKE 1 CAPSULE BY MOUTH ONCE DAILY - dose decrease  Quantity: 90 cap(s)  Days Supply: 90  Refills: 1  Substitutions Allowed  Notes from Pharmacy:     ** On Hold Pending Signature **     Drug: lisinopril (lisinopril 40 mg oral tablet), TAKE 1 TABLET BY MOUTH ONCE DAILY  Quantity: 90 tab(s)  Days Supply: 90  Refills: 1  Substitutions Allowed  Notes from Pharmacy:      Dispensed Drug: lisinopril (lisinopril 40 mg oral tablet), TAKE 1 TABLET BY MOUTH ONCE DAILY  Quantity: 90 tab(s)  Days Supply: 90  Refills: 1  Substitutions Allowed  Notes from Pharmacy:  ------------------------------------------

## 2022-02-15 NOTE — TELEPHONE ENCOUNTER
---------------------  From: Joya Allen CMA (Phone Messages Pool ([id number]))   To: Macario Frances MD;     Sent: 5/8/2019 8:01:09 AM CDT  Subject: FW: FW: Question for Dr. Frances on Heart Monitor           ---------------------  From: GAURANG BENITEZ  To: [facility]  Sent: 05/07/2019 08:15 p.m. CDT  Subject: RE: FW: Question for Dr. Frances on Heart Monitor  I have not noticed any irregularities in heart beat since I saw you on April 29.    Gaurang       Addendum by Joya Allen CMA on May 07, 2019 2:02:17 PM CDT  ---------------------  From: Joya Allen CMA (Phone Messages Pool ([id number]))  To: GAURANG BENITEZ  Sent: 5/7/2019 2:02:17 PM CDT  Subject: FW: Question for Dr. Frances on Heart Monitor       Addendum by Macario Frances MD on May 07, 2019 1:33:31 PM CDT  ---------------------  From: Macario Frances MD  To: Get Me Listed Pool ([id number]);  Sent: 5/7/2019 1:33:31 PM CDT  Subject: RE: Question for Dr. Frances on Heart Monitor       Have you had any symptoms? Would liketo get recordings of a couple symptom episodes.  ---------------------  From: Charlette Silva CMA (Phone Messages Pool ([id number]))  To: Macario Frances MD;  Sent: 5/7/2019 11:54:55 AM CDT  Subject: FW: Question for Dr. Frances on Heart Monitor                      ---------------------  From: GAURANG BENITEZ  To: [facility]  Sent: 05/07/2019 11:45 a.m. CDT  Subject: Question for Dr. Frances on Heart Monitor  Hi Dr. Frances,  I am one week into wearing the heart monitor.  When it was first issued to me, the technician mentioned that it might not need to be 30 days, and that it was possible you would have enough information at some point to call it off.  So of course that stuck in my mind, and here I am, one week later, wondering if there s good reason to continue the monitor.  Impatient patient here.  Of course, I ll do whatever you  carmen Benitez---------------------  From: Macario Frances MD   To: Phone Messages Pool ([id number] - [place]);     Sent: 5/8/2019 8:16:49 AM CDT  Subject: RE: FW: Question for Dr. Frances on Heart Monitor     Let's try continue recording until you do, as long as tolerable.---------------------  From: Joya Allen CMA (Phone Messages Pool ([id number]_[facility]))   To: CHANA BENITEZ    Sent: 5/8/2019 8:21:13 AM CDT  Subject: FW: FW: Question for Dr. Frances on Heart Monitor     Colt Merlos,    Please see Dr Frances's response.  Let us know if you have any questions    Joya

## 2022-02-15 NOTE — NURSING NOTE
Called pt at 1210. Asking pt if he need rx refills as it looks like he would need a refill 4/29/20. Patient stated that he would need a refill. Per protocol, medications filled #90 due to COVID-19 Pandemic. RTC placed for 7/1/20.

## 2022-02-15 NOTE — TELEPHONE ENCOUNTER
---------------------  From: Charlette Silva CMA (Phone Messages Pool ([id number] - [place]))   To: Macario Frances MD;     Sent: 5/7/2019 11:54:55 AM CDT  Subject: FW: Question for Dr. Frances on Heart Monitor           ---------------------  From: GAURANG BENITEZ  To: [facility]  Sent: 05/07/2019 11:45 a.m. CDT  Subject: Question for Dr. Frances on Heart Monitor  Hi Dr. Frances,    I am one week into wearing the heart monitor.  When it was first issued to me, the technician mentioned that it might not need to be 30 days, and that it was possible you would have enough information at some point to call it off.    So of course that stuck in my mind, and here I am, one week later, wondering if there s good reason to continue the monitor.    Impatient patient here.    Of course, I ll do whatever you suggest.    Gaurang Benitez---------------------  From: Macario Frances MD   To: Phone Messages Pool ([id number] - [place]);     Sent: 5/7/2019 1:33:31 PM CDT  Subject: RE: Question for Dr. Frances on Heart Monitor     Have you had any symptoms? Would liketo get recordings of a couple symptom episodes.---------------------  From: Joya Allen CMA (Phone Messages Pool ([id number] [facility]))   To: GAURANG BENITEZ    Sent: 5/7/2019 2:02:17 PM CDT  Subject: FW: Question for Dr. Frances on Heart Monitor

## 2022-02-15 NOTE — TELEPHONE ENCOUNTER
---------------------  From: Nicole Llanos LPN (Phone Messages Pool ([address] [place] [address]))   To: [employer] Message Pool ([address] [place] [address]);     Sent: 12/13/2019 4:05:26 PM CST  Subject: CONSUMER MESSAGE FW: Dr. Frances - Question           ---------------------  From: GAURANG BENITEZ  To: [facility]  Sent: 12/13/2019 04:03 p.m. CST  Subject: Dr. Frances - Question  One question about the new medicines I m starting on:  when -- if at all -- do I need to get back to you for followup?  When we thought we were dealing with anaplastosis, it was to be next week.  Still the plan?    Thanks.    Gaurang Benitez---------------------  From: Darling Oscar ([employer] Message Pool ([address] [place] [address]))   To: Macario Frances MD;     Sent: 12/13/2019 4:06:49 PM CST  Subject: FW: CONSUMER MESSAGE FW: Dr. Frances - Question---------------------  From: Macario Frances MD   To: [employer] Message Pool ([address] [place] [address]);     Sent: 12/13/2019 5:13:35 PM CST  Subject: RE: CONSUMER MESSAGE FW: Dr. Frances - Question     Take the medications with food. Have a blood test next week and se me in 2 weeks.Patient called back today @ 0760. Stating that he is not noticing a difference while taking the medications. Patient notified of [facility] response below and transferred to scheduling to make appointments.Patient called @ [phone] stating J should call his cell @ [phone] instead of house phone. No documentation of call to patient. Forwarded to [facility]

## 2022-02-15 NOTE — TELEPHONE ENCOUNTER
---------------------  From: Frankie Mac PA-C   To: Phone Messages Pool ([id number] - [place]);     Sent: 5/29/2019 3:54:10 PM CDT  Subject: General Message     Please call and tell him that his Lyme titer was negative, but with the rash, I would finish the Doxy.  Let us know if any other symptoms develop.    Raven Call    Time: 3:59pm    Note: [facility]

## 2022-02-15 NOTE — LETTER
(Inserted Image. Unable to display)   June 04, 2019      CHANA BENITEZ   [address] [place], [place] [id number]        Dear CHANA,      Thank you for selecting [facility] ([facility]viously [facility] for your healthcare needs.       your 30 day heart monitor showed no abnormalities. Please schedule an appointment at your convenience to review the results.          Please contact me or my assistant at [phone] you have any questions or concerns.     Sincerely,        Macario Frances MD

## 2022-02-15 NOTE — TELEPHONE ENCOUNTER
---------------------  From: Elise Almazan RN (Phone Messages Pool ([id number]))   To: [employer] Message Pool ([address] [place] [address]);     Sent: 11/1/2021 9:31:09 AM CDT  Subject: CONSUMER MESSAGE  FW: Follow-up on positive COVID test           ---------------------  From: CHANA BENITEZ  To: [facility]  Sent: 10/31/2021 01:01 p.m. CDT  Subject: Follow-up on positive COVID test  For Dr. Kelley:  I have had minor symptoms -- post-nasal drip and coughing -- for the past few days, but assumed it was allergy related.  This morning it felt more like flu symptoms.  I performed a home COVID test and it shows positive.    Is there any point in me coming in to the clinic?  I assume not, unless my symptoms get worse.    Thanks Riya Benitez---------------------  From: Charlette Silva CMA ([employer] Message Pool ([id number]))   To: Jason Kelley MD;     Sent: 11/2/2021 7:07:55 AM CDT  Subject: FW: CONSUMER MESSAGE  FW: Follow-up on positive COVID test---------------------  From: Jason Kelley MD   To: [employer] Message Pool ([id number]);     Sent: 11/2/2021 7:48:55 AM CDT  Subject: RE: CONSUMER MESSAGE  FW: Follow-up on positive COVID test     He is high risk based on age even if vaccinated   he should get monoclonal antibodies(IV infusion)   Check with Tanvir to see if he needs pcr or if home test enough  Treatment needs to happen in 1st 7-10 days of sxsPt saw DAVID yesterday. Monoclonal order process started.

## 2022-02-15 NOTE — PROGRESS NOTES
Patient:   CHANA BENITEZ            MRN: [id number]            FIN: [id number]               Age:   76 years     Sex:  Male     :  1945   Associated Diagnoses:   Dyslipidemia, goal LDL below 130; Hypertension   Author:   Jason Kelley MD      Visit Information      Date of Service: 2021 09:37 am  Performing Location: [facility]  Encounter#: [id number]      Primary Care Provider (PCP):  Jason Kelley MD    NPI# [id number]      Referring Provider:  Jason Kelley MD    NPSHANNA# [id number]      Chief Complaint   2021 9:40 AM CST   HTN f/u        History of Present Illness   bp HIGH AT HOME WANTS NEW MED      Health Status   Allergies:    Nonallergic Reactions (Selected)  Severity Not Documented  HYDROcodone (Nausea and nausea & vomiting.)  OxyCODONE (Nausea)   Medications:  (Selected)   Prescriptions  Prescribed  Crestor 10 mg oral tablet: = 1 tab(s) ( 10 mg ), Oral, daily, # 90 tab(s), 3 Refill(s), Type: Maintenance, Pharmacy: [facility], 1 tab(s) Oral daily, 69, in, 21 9:40:00 CST, Height Measured, 158, lb, 21 9:40:00 CST, Weight Measured  amLODIPine 5 mg oral tablet: = 1 tab(s) ( 5 mg ), Oral, daily, # 90 tab(s), 1 Refill(s), Type: Maintenance, Pharmacy: [facility], 1 tab(s) Oral daily, 69, in, 21 9:40:00 CST, Height Measured, 158, lb, 21 9:40:00 CST, Weight Measured  hydroCHLOROthiazide 12.5 mg oral capsule: = 1 cap(s), Oral, daily, Instructions: dose decrease., # 90 cap(s), 1 Refill(s), Pharmacy: [facility], 1 cap(s) Oral daily,Instr:dose decrease., 69, in, 21 10:23:00 CST, Height Measured, 160.4, lb, 21 10:23:00 CST, Weight Measured  lisinopril 40 mg oral tablet: = 1 tab(s), Oral, daily, # 90 tab(s), 1 Refill(s), Pharmacy: [facility], 1 tab(s) Oral daily, 69, in, 21 10:23:00 CST, Height Measured, 160.4, lb, 21 10:23:00 CST, Weight Measured  tamsulosin 0.4 mg oral capsule: = 1 cap(s), Oral, daily, # 90 EA, 3  Refill(s), Type: Maintenance, Pharmacy: [facility], TAKE ONE CAPSULE BY MOUTH ONCE DAILY, 69, in, 06/15/21 13:40:00 CDT, Height Measured, 152.6, lb, 06/15/21 13:40:00 CDT, Weight Measured  triamcinolone 0.1% topical cream: 1 ariel, Topical, tid, Instructions: apply a thin film  to affected area, # 60 gm, 6 Refill(s), Type: Maintenance, Pharmacy: [facility], 1 ariel Topical tid,Instr:apply a thin film; to affected area, 69, in, 12/09/21 10:23:00 CST, Height Measured, 160.4...  Documented Medications  Documented  timolol: Oral, daily, Instructions: Rx'd by Opthamology, 0 Refill(s), Type: Maintenance,    Medications          *denotes recorded medication          amLODIPine 5 mg oral tablet: 5 mg, 1 tab(s), Oral, daily, 90 tab(s), 1 Refill(s).          hydroCHLOROthiazide 12.5 mg oral capsule: 1 cap(s), Oral, daily, dose decrease., 90 cap(s), 1 Refill(s).          lisinopril 40 mg oral tablet: 1 tab(s), Oral, daily, 90 tab(s), 1 Refill(s).          Crestor 10 mg oral tablet: 10 mg, 1 tab(s), Oral, daily, 90 tab(s), 3 Refill(s).          tamsulosin 0.4 mg oral capsule: 1 cap(s), Oral, daily, 90 EA, 3 Refill(s).          *timolol: Oral, daily, Rx'd by Opthamology, 0 Refill(s).          triamcinolone 0.1% topical cream: 1 ariel, Topical, tid, apply a thin film  to affected area, 60 gm, 6 Refill(s).       Problem list:    All Problems (Selected)  Hypertension / SNOMED CT 8318071092 / Confirmed  Dyslipidemia, goal LDL below 130 / SNOMED CT 95450565 / Confirmed  Family history of heart disease / SNOMED CT [id number] / Confirmed  Agatston coronary artery calcium score less than 100 / SNOMED CT 7894586994 / Confirmed  Hearing loss in left ear / SNOMED CT 522912343 / Confirmed  Seasonal allergies / SNOMED CT 711612207 / Confirmed  Palpitations / SNOMED CT 032533923 / Confirmed  H/O babesiosis / SNOMED CT 5941278340 / Confirmed      Histories   Past Medical History:    Active  Hypertension  (5575172856)  Dyslipidemia, goal LDL below 130 (21899596)  Comments:  4/15/2014 CDT 11:01 AM Macario Callejas MD  10 year risk 14.8%. Moderate to high intensity statin indicated.    2017 CDT 2:49 PM Macario Callejas MD  10 year estimated risk 21%    2018 CDT 10:27 AM Macario Callejas MD  10 year estimated risk 17.5% improved to 14.1% with optimal care  Agatston coronary artery calcium score less than 100 (9561689065)  Comments:  10/16/2015 CDT 8:45 AM Macario Callejas MD  62  Hearing loss in left ear (905473834)  Seasonal allergies (509884703)  Palpitations (658127192)   Family History:    Hypertension  Mother (): onset at 60 .  Hypothyroidism  Mother ()  Daughter (Aisha)  Myocardial infarction  Father ()  Tobacco abuse  Father ()  MI - Myocardial infarction  Father ()  Comments:  2013 5:04 PM CST - Petty Raymond  First MI late 40s.     Procedure history:    Screening for malignant neoplasm of colon (SNOMED CT 2675582262) on 2021 at 76 Years.  Comments:  2021 9:26 AM CDT - Felecia Nicole - Negative  Cardiac computed tomography for calcium scoring (SNOMED CT 0656741262) on 3/25/2014 at 68 Years.  Comments:  2017 3:44 PM CDT - Marya Morrison  Total Agatston calcium score is 62  Colonoscopy (SNOMED CT 448775677) on 2011 at 66 Years.  Comments:  2011 12:58 PM Macario Callejas MD  Negative. Repeat in 10 years.  Knee Arthrosopy in  at 62 Years.  Colonoscopy (SNOMED CT 653068213) on 3/15/1996 at 50 Years.  Comments:  3/31/2011 9:45 AM Macario Callejas MD  normal  Rectal biopsy (SNOMED CT 846960033) on 3/15/1996 at 50 Years.  Appendectomy (SNOMED CT 194984682) in  at 15 Years.   Social History:        Electronic Cigarette/Vaping Assessment            Electronic Cigarette Use: Never.      Alcohol Assessment: Current            Current, Daily, 2 drinks/episode average.  2 drinks/episode  maximum.      Tobacco Assessment            Never (less than 100 in lifetime)      Substance Abuse Assessment: Denies Substance Abuse      Employment and Education Assessment            Employed, Work/School description: .      Home and Environment Assessment            Marital status: .  Spouse/Partner name: Alona Simons.  Living situation: Home/Independent.      Exercise and Physical Activity Assessment: Regular exercise        Physical Examination   Vital Signs   12/16/2021 9:40 AM CST Temperature Tympanic 96.4 DegF  LOW    Peripheral Pulse Rate 66 bpm    Systolic Blood Pressure 156 mmHg  HI    Diastolic Blood Pressure 90 mmHg  HI    Mean Arterial Pressure 112 mmHg      Measurements from flowsheet : Measurements   12/16/2021 9:40 AM CST Height Measured - Standard 69 in    Weight Measured - Standard 158 lb    BSA 1.87 m2    Body Mass Index 23.33 kg/m2      General:  Alert and oriented, No acute distress.       Health Maintenance      Recommendations     Pending (in the next year)        OverDue           Eye Exam due  06/01/13  and every 1  year(s)           Influenza Vaccine due  09/01/21  and every 1  year(s)        Due            Fall Risk Screen due  12/16/21  and every 1  year(s)           Hepatitis C Screen 0011-4517 due  12/16/21  One-time only           Type 2 Diabetes Mellitus Screen due  12/16/21  Variable frequency        Due In Future            Depression Screen not due until  06/15/22  and every 1  year(s)           Lipid Disorders Screen not due until  06/15/22  and every 1  year(s)     Satisfied (in the past 1 year)        Satisfied            Body Mass Index Check on  12/16/21.           Body Mass Index Check on  12/09/21.           Body Mass Index Check on  06/15/21.           COVID-19 Vaccine (Moderna) Dose 2 on  04/14/21.           COVID-19 Vaccine (Moderna) Dose 1 on  03/17/21.           Depression Screen on  06/15/21.           High Blood Pressure Screen on  12/16/21.            High Blood Pressure Screen on  12/09/21.           High Blood Pressure Screen on  06/15/21.           High Blood Pressure Screen on  05/12/21.           Lipid Disorders Screen on  06/15/21.           Lipid Disorders Screen on  06/15/21.           Lipid Disorders Screen on  06/15/21.           Lipid Disorders Screen on  06/15/21.           Tobacco Use Screen on  12/16/21.           Tobacco Use Screen on  12/09/21.           Tobacco Use Screen on  11/01/21.           Tobacco Use Screen on  05/12/21.          Impression and Plan   Diagnosis     Dyslipidemia, goal LDL below 130 (UOS02-RS E78.5).     Hypertension (UNZ18-XM I10).     Plan:  add norvasc  track bp  start crestor   fu 6 mo.    Patient Instructions:       Counseled: Patient, Regarding diagnosis, Regarding treatment, Regarding medications.

## 2022-02-15 NOTE — NURSING NOTE
Comprehensive Intake Entered On:  10/30/2019 1:14 PM CDT    Performed On:  10/30/2019 1:12 PM CDT by Trinidad Ward               Summary   Chief Complaint :   Possible tick on scrotum.    Advance Directive :   Yes   Weight Measured :   159.4 lb(Converted to: 159 lb 6 oz, 72.30 kg)    Height Measured :   69 in(Converted to: 5 ft 9 in, 175.26 cm)    Body Mass Index :   23.54 kg/m2   Body Surface Area :   1.87 m2   Systolic Blood Pressure :   136 mmHg (HI)    Diastolic Blood Pressure :   82 mmHg (HI)    Mean Arterial Pressure :   100 mmHg   Peripheral Pulse Rate :   71 bpm   BP Method :   Electronic   HR Method :   Electronic   Temperature Tympanic :   97.8 DegF(Converted to: 36.6 DegC)  (LOW)    Trinidad Ward - 10/30/2019 1:12 PM CDT   Health Status   Allergies Verified? :   Yes   Medication History Verified? :   Yes   Tobacco Use? :   Never smoker   Trinidad Ward - 10/30/2019 1:12 PM CDT   Meds / Allergies   (As Of: 10/30/2019 1:14:13 PM CDT)   Allergies (Active)   HYDROcodone  Estimated Onset Date:   Unspecified ; Reactions:   nausea, Nausea & vomiting. ; Created By:   Macario Frances MD; Reaction Status:   Active ; Category:   Drug ; Substance:   HYDROcodone ; Type:   Intolerance ; Updated By:   Macario Frances MD; Reviewed Date:   10/25/2019 12:07 PM CDT      oxyCODONE  Estimated Onset Date:   <not entered> 8/3/2012 ; Reactions:   Nausea ; Created By:   Macario Frances MD; Reaction Status:   Active ; Category:   Drug ; Substance:   oxyCODONE ; Type:   Side Effect ; Updated By:   Macario Frances MD; Reviewed Date:   10/25/2019 12:07 PM CDT        Medication List   (As Of: 10/30/2019 1:14:13 PM CDT)   Prescription/Discharge Order    fluticasone nasal  :   fluticasone nasal ; Status:   Prescribed ; Ordered As Mnemonic:   Flonase 50 mcg/inh nasal spray ; Simple Display Line:   2 spray(s), Nasal, daily, 1 EA, 6 Refill(s) ; Ordering Provider:   Jason Kelley MD; Catalog Code:   fluticasone nasal ; Order  Dt/Tm:   9/11/2019 11:04:38 AM CDT          lisinopril  :   lisinopril ; Status:   Prescribed ; Ordered As Mnemonic:   lisinopril 40 mg oral tablet ; Simple Display Line:   40 mg, 1 tab(s), po, daily, for 90 day(s), 90 tab(s), 3 Refill(s) ; Ordering Provider:   Macario Frances MD; Catalog Code:   lisinopril ; Order Dt/Tm:   4/29/2019 12:42:00 PM CDT          hydroCHLOROthiazide  :   hydroCHLOROthiazide ; Status:   Prescribed ; Ordered As Mnemonic:   hydroCHLOROthiazide 12.5 mg oral capsule ; Simple Display Line:   12.5 mg, 1 cap(s), po, daily, for 90 day(s), 90 cap(s), 3 Refill(s) ; Ordering Provider:   Macario Frances MD; Catalog Code:   hydroCHLOROthiazide ; Order Dt/Tm:   4/29/2019 12:41:59 PM CDT

## 2022-02-15 NOTE — PROGRESS NOTES
Patient:   CHANA BENITEZ            MRN: [id number]            FIN: [id number]               Age:   71 years     Sex:  Male     :  1945   Associated Diagnoses:   Medicare annual wellness visit, subsequent; Agatston coronary artery calcium score less than 100; Family history of heart disease; Dyslipidemia, goal LDL below 130; Hypertension; Hearing loss in left ear; Prostate nodule   Author:   Macario Frances MD      Visit Information   Visit type:  Annual exam.    Accompanied by:  No one.    Referral source:  Self.    History limitation:  None.       Chief Complaint   2017 2:29 PM CDT     AWV      History of Present Illness   The patient is a 71-year-old retired  who is here for a wellness visit.  He has been well.  He has been doing some traveling.  His back has not bothered  him. He is very satisfied with his health.  He is active.  There are no positives on the health risk assessment form.                Review of Systems    He wears glasses.  He has tinnitus and decreased hearing; this has been a   chronic problem.    He has high blood pressure.  He has nocturia once or twice  per night.  He has seasonal allergies.  There is a history of skin cancer.  The complete review of systems is otherwise negative.    PHQ-9 score:  0.      He drinks 2 glasses of wine 5-7 times per week.    I have reviewed his lipids with him.  His 10-year estimated risk is 21 percent;  this may improve to 14 percent with optimization.  We discussed a statin,  also low-dose aspirin, and increasing his blood pressure medication. He really  does not want to take a statin or an aspirin.  He is comfortable with a lower  target for his blood pressure.            Health Status   Allergies:    Nonallergic Reactions (Selected)  Severity Not Documented  HYDROcodone (Nausea and nausea & vomiting.)  OxyCODONE (Nausea)   Medications:  (Selected)   Prescriptions  Prescribed  hydroCHLOROthiazide 12.5 mg oral capsule: 1 cap(s) ( 12.5 mg ),  PO, Daily, # 90 cap(s), 3 Refill(s), Type: Maintenance, Pharmacy: [facility], 1 cap(s) po daily  lisinopril 40 mg oral tablet: 1 tab(s) ( 40 mg ), PO, Daily, # 90 tab(s), 0 Refill(s), Type: Maintenance, Pharmacy: [facility], 1 tab(s) po daily   Problem list:    All Problems  Agatston coronary artery calcium score less than 100 / SNOMED CT 5381369699 / Confirmed  Family history of heart disease / SNOMED CT [id number] / Confirmed  Dyslipidemia, goal LDL below 130 / SNOMED CT 57392905 / Confirmed  Hypertension / SNOMED CT 0943685965 / Confirmed  Hearing loss in left ear / SNOMED CT 144884770 / Confirmed  Inactive: LBP (Low Back Pain) / ICD-9-.2      Histories   Past Medical History:    Active  Hypertension ([id number])   Family History:    Hypertension  Mother  Hypothyroidism  Mother  Daughter (Aisha)  Myocardial infarction  Father ()  Tobacco abuse  Father ()  MI - Myocardial infarction  Father ()  Comments:  2013 5:04 PM - Petty Raymond MI late 40s.     Procedure history:    Colonoscopy ([id number]) on 2011 at 66 Years.  Comments:  2011 12:58 PM - Macario Frances MD  Negative. Repeat in 10 years.  Knee Arthrosopy in  at 62 Years.  Colonoscopy ([id number]) on 3/15/1996 at 50 Years.  Comments:  3/31/2011 9:45 AM - Macario Frances MD  normal  Rectal biopsy ([id number]) on 3/15/1996 at 50 Years.  Appendectomy ([id number]) in  at 15 Years.   Social History:        Alcohol Assessment: Current      Tobacco Assessment: Denies Tobacco Use      Substance Abuse Assessment: Denies Substance Abuse      Employment and Education Assessment            Employed, Work/School description: .      Home and Environment Assessment            Marital status: .      Exercise and Physical Activity Assessment: Regular exercise      Other Assessment            Marital status        Physical Examination   Vital Signs   2017 2:42 PM CDT  Systolic Blood Pressure 142 mmHg  HI    Diastolic Blood Pressure 76 mmHg    Mean Arterial Pressure 98 mmHg    BP Site Right arm    BP Method Manual   4/6/2017 2:29 PM CDT Peripheral Pulse Rate 69 bpm    Systolic Blood Pressure 145 mmHg  HI    Diastolic Blood Pressure 87 mmHg    Mean Arterial Pressure 106 mmHg      Measurements from flowsheet : Measurements   4/6/2017 2:29 PM CDT Height Measured - Standard 69 in    Weight Measured - Standard 165 lb    BSA 1.91 m2    Body Mass Index 24.36 kg/m2      General:  Alert and oriented, No acute distress.    Eye:  Normal conjunctiva.    HENT:  Normocephalic, Normal hearing, Oral mucosa is moist, No pharyngeal erythema, No sinus tenderness.    Neck:  Supple, Non-tender, No carotid bruit, No lymphadenopathy, No thyromegaly.    Respiratory:  Lungs are clear to auscultation, Respirations are non-labored.    Cardiovascular:  Normal rate, Regular rhythm, No murmur, No gallop, Good pulses equal in all extremities, Normal peripheral perfusion, No edema.    Gastrointestinal:  Soft, Non-tender, Non-distended, No organomegaly.    Genitourinary:       Prostate: Enlarged  2 /4, Nodule left lobe, Asymmetric.    Lymphatics:  No lymphadenopathy neck, axilla, groin.    Musculoskeletal:  No deformity, Normal gait.    Integumentary:  No pallor.    Neurologic:  Normal sensory, Normal motor function, No focal deficits, Cranial Nerves II-XII are grossly intact.    Cognition and Speech:  Speech clear and coherent, Functional cognition intact.    Psychiatric:  Cooperative, Appropriate mood & affect, Normal judgment, Non-suicidal.       Review / Management   Results review:  Lab results   4/4/2017 9:06 AM CDT Sodium Level 140 mmol/L    Potassium Level 4.9 mmol/L    Chloride Level 102 mmol/L    CO2 Level 33 mmol/L  HI    Glucose Level 90 mg/dL    BUN 16 mg/dL    Creatinine Level 0.92 mg/dL    BUN/Creat Ratio NOT APPLICABLE    eGFR 83 mL/min/1.73m2    eGFR African American 97 mL/min/1.73m2    Calcium  Level 9.7 mg/dL    Cholesterol 227 mg/dL  HI    Non-HDL Cholesterol 132    HDL 95 mg/dL    Cholesterol/HDL Ratio 2.4        Triglyceride 54 mg/dL    PSA 0.7 ng/mL   .       Impression and Plan   Diagnosis     Medicare annual wellness visit, subsequent (ELU42-BB Z00.00).     Agatston coronary artery calcium score less than 100 (WEJ11-JU R93.8).     Family history of heart disease (SCM59-NT Z82.49).     Dyslipidemia, goal LDL below 130 (GMS30-UK E78.5).     Hypertension (FSN89-DB I10).     Hearing loss in left ear (YJD37-PR H91.92).     Patient Instructions:       Counseled: Patient, Diet, Activity, Verbalized understanding.    Summary:  BP above goal and 10 year risk 21% discussed with patient as above..    Orders     Orders (Selected)   Outpatient Orders  Ordered  Return to Clinic (Request): RFV: Wellness exam, Return in 1 year  Return to Office (Request): RFV: BMP, Lipid, PSA q 12 mo  Return to Office (Request): RFV: Colonoscopy in 10 years  Prescriptions  Prescribed  hydroCHLOROthiazide 12.5 mg oral capsule: 1 cap(s) ( 12.5 mg ), PO, Daily, # 90 cap(s), 3 Refill(s), Type: Maintenance, Pharmacy: [facility], 1 cap(s) po daily  lisinopril 40 mg oral tablet: 1 tab(s) ( 40 mg ), PO, Daily, # 90 tab(s), 0 Refill(s), Type: Maintenance, Pharmacy: [facility], 1 tab(s) po daily  Discontinued  hydrochlorothiazide-lisinopril 12.5 mg-20 mg oral tablet: 1 tab(s), PO, Daily, # 90 tab(s), 3 Refill(s), Type: Maintenance, Pharmacy: [facility], 1 tab(s) po daily.     Will call if he wishes to start a statin.     Annual Flu.     Diagnosis     Prostate nodule (JLH82-PZ N40.2).     Course:  PSA unchanged and <1.0.    Orders     Orders (Selected)   Outpatient Orders  Ordered  Urology Consult (Request): Referred to: [facility] or [facility], Reason: Prostate asymmetry, Prostate nodule.

## 2022-02-15 NOTE — NURSING NOTE
Quick Intake Entered On:  9/30/2019 11:21 AM CDT    Performed On:  9/30/2019 10:55 AM CDT by Zo Hernandez RN               Summary   Advance Directive :   Yes   Height Measured :   69 in(Converted to: 5 ft 9 in, 175.26 cm)    Systolic Blood Pressure :   112 mmHg   Diastolic Blood Pressure :   70 mmHg   Mean Arterial Pressure :   84 mmHg   BP Site :   Right arm   BP Method :   Manual   Zo Hernandez RN - 9/30/2019 11:20 AM CDT

## 2022-02-15 NOTE — TELEPHONE ENCOUNTER
---------------------  From: Elise Almazan RN (Phone Messages Pool ([id number]))   To: [facility] Message Pool ([id number]);     Sent: 11/1/2021 5:22:43 PM CDT  Subject: Monoclonal Antibodies     Pt calling to inform Ede that he was not contacted today to get the monoclonal antibodies appointment set up.  (As of 4:30 at least)JIGNESH faxed these forms day of appointment and received confirmation.  Ede Nair CMA---------------------  From: Ede Nair CMA ([facility] Message Pool ([id number]))   To: Meliton Esparza PA-C;     Sent: 11/2/2021 7:48:31 AM CDT  Subject: FW: Monoclonal Antibodies---------------------  From: Meliton Esparza PA-C   To: [facility] Message Pool ([id number]);     Sent: 11/2/2021 8:04:04 AM CDT  Subject: RE: Monoclonal Antibodies     I called Bhargavi at [facility] and she said our patient is on the list for today. I called him and   informed him.  But still no guarantee that he will get it todaynoted.  Ede Nair CMA

## 2022-02-15 NOTE — PROGRESS NOTES
"   Patient:   CHANA BENITEZ            MRN: [id number]            FIN: [id number]               Age:   74 years     Sex:  Male     :  1945   Associated Diagnoses:   Fever; Fatigue   Author:   Meliton Esparza PA-C      Chief Complaint   2019 12:37 PM CST  Pt here for FU on tick bite from 10/30/19.  Pt states he has had some episodes of \"low energy\" and nausea and chills on Saturday night.      History of Present Illness   Chief complaint and symptoms noted above and confirmed with patient   he had embedded tick on 10/30, took one dose of doxycycline  then 2 days ago developed fever, fatigue, nausea, chills  no rashes      Review of Systems   Constitutional:  Fever, Chills, Fatigue.    Gastrointestinal:  Nausea, No vomiting.    Neurologic:  Headache.       Health Status   Allergies:    Nonallergic Reactions (All)  Severity Not Documented  HYDROcodone (Nausea and nausea & vomiting.)  OxyCODONE (Nausea)  Canceled/Inactive Reactions (All)  No known allergies   Medications:  (Selected)   Prescriptions  Prescribed  Flonase 50 mcg/inh nasal spray: = 2 spray(s), Nasal, daily, # 1 EA, 6 Refill(s), Type: Maintenance, Pharmacy: [facility], 2 spray(s) Nasal daily  doxycycline monohydrate 100 mg oral capsule: See Instructions, Instructions: take 2 tabs po once for tick bite, # 20 EA, 0 Refill(s), Type: Maintenance, Pharmacy: [facility], take 2 tabs po once for tick bite  hydroCHLOROthiazide 12.5 mg oral capsule: = 1 cap(s) ( 12.5 mg ), po, daily, # 90 cap(s), 3 Refill(s), Type: Soft Stop, Pharmacy: [facility], 1 cap(s) Oral daily,x90 day(s)  lisinopril 40 mg oral tablet: = 1 tab(s) ( 40 mg ), po, daily, # 90 tab(s), 3 Refill(s), Type: Soft Stop, Pharmacy: [facility], 1 tab(s) Oral daily,x90 day(s)   Problem list:    All Problems (Selected)  Hypertension / SNOMED CT 3994574610 / Confirmed  Palpitations / SNOMED CT 783794306 / Confirmed  Hearing loss in left ear / SNOMED CT 540194280 / Confirmed  Agatston coronary " artery calcium score less than 100 / SNOMED CT 5982071622 / Confirmed  Seasonal allergies / SNOMED CT 113675629 / Confirmed  Family history of heart disease / SNOMED CT [id number] / Confirmed  Dyslipidemia, goal LDL below 130 / SNOMED CT 13502180 / Confirmed      Histories   Past Medical History:    Active  Hypertension (1058970371)  Dyslipidemia, goal LDL below 130 (42494923)  Comments:  4/15/2014 CDT 11:01 AM Macario Callejas MD  10 year risk 14.8%. Moderate to high intensity statin indicated.    2017 CDT 2:49 PM Macario Callejas MD  10 year estimated risk 21%    2018 CDT 10:27 AM Macario Callejas MD  10 year estimated risk 17.5% improved to 14.1% with optimal care  Agatston coronary artery calcium score less than 100 (9906378934)  Comments:  10/16/2015 CDT 8:45 AM Macario Callejas MD  62  Hearing loss in left ear (323935155)  Seasonal allergies (885868780)  Palpitations (631478996)   Family History:    Hypertension  Mother (): onset at 60 .  Hypothyroidism  Mother ()  Daughter (Aisha)  Myocardial infarction  Father ()  Tobacco abuse  Father ()  MI - Myocardial infarction  Father ()  Comments:  2013 5:04 PM CST - Petty Raymond  First MI late 40s.        Physical Examination   Vital Signs   2019 12:37 PM CST Temperature Tympanic 97.9 DegF    Peripheral Pulse Rate 64 bpm    Pulse Site Radial artery    Respiratory Rate 16 br/min    Systolic Blood Pressure 126 mmHg    Diastolic Blood Pressure 82 mmHg  HI    Mean Arterial Pressure 97 mmHg    BP Site Right arm    Oxygen Saturation 97 %      General:  No acute distress.    HENT:  Tympanic membranes are clear, No pharyngeal erythema, No sinus tenderness.    Neck:  Supple, Non-tender, No lymphadenopathy.    Respiratory:  Lungs are clear to auscultation.    Cardiovascular:  Normal rate, Regular rhythm, No murmur.    Integumentary:  No rash.       Review / Management    Results review:  Lab results   11/18/2019 1:01 PM CST Influenza A NEGATIVE    Influenza B NEGATIVE   .       Impression and Plan   Diagnosis     Fever (RUU76-YA R50.9).     Fatigue (TNW78-TV R53.83).     Summary:  test for flu was negative, awaiting Lyme testing, will call when results are available  meanwhile, could be viral syndrome, treat symptoms as needed, follow up if not improving.    Orders     Orders   Lab (Gen Lab  Reference Lab):  POC Influenza A and B Antigen* ([facility]) (Order): Specimen Type: Nasopharyngeal Swab, Collection Date: 11/18/2019 12:55 PM CST.     Orders   Lab (Gen Lab  Reference Lab):  Lyme disease antibody, total, eia with reflex to western blot (igg,igm)* ([facility]) (Order): Specimen Type: Serum, Collection Date: 11/18/2019 12:55 PM CST.     Orders   Charges (Evaluation and Management):  53914 office outpatient visit 15 minutes ([facility]) (Order): Quantity: 1, Fever  Fatigue.

## 2022-02-15 NOTE — PROGRESS NOTES
Patient:   CHANA BENITEZ            MRN: [id number]            FIN: [id number]               Age:   72 years     Sex:  Male     :  1945   Associated Diagnoses:   Family history of heart disease; Agatston coronary artery calcium score less than 100; Dyslipidemia, goal LDL below 130; Hypertension; Medicare annual wellness visit, subsequent   Author:   Macario Frances MD      Visit Information   Visit type:  Annual exam.    Accompanied by:  No one.    Referral source:  Self.    History limitation:  None.       Chief Complaint   2018 10:11 AM CDT   AWV      History of Present Illness   The patient is a healthy 72-year-old who is here for a wellness visit.  He has  hypertension.  The Agatston calcium score is 62.  There is a family history of  heart disease.     We reviewed his lab work. His estimated 10-year risk of heart death or heart  attack is 17.5 percent, improving to 14.1 percent with optimal care.  He does not  wish to take a statin or aspirin.  His blood pressure is well-controlled.    His back is doing well.    He spent the winter in [place].    We discussed prostate cancer screening; he is no longer interested.                Review of Systems   The complete review of systems is entirely negative.    The Geriatric Depression Scale (GDS) short-form score is 0.    There are no significant issues on the health risk assessment form; there are  some safety issues which I have reviewed.            Health Status   Allergies:    Nonallergic Reactions (Selected)  Severity Not Documented  HYDROcodone (Nausea and nausea & vomiting.)  OxyCODONE (Nausea)   Medications:  (Selected)   Prescriptions  Prescribed  hydroCHLOROthiazide 12.5 mg oral capsule: 1 cap(s) ( 12.5 mg ), po, daily, # 90 cap(s), 3 Refill(s), Type: Soft Stop, Pharmacy: [facility], 1 cap(s) po daily,x90 day(s)  lisinopril 40 mg oral tablet: 1 tab(s) ( 40 mg ), po, daily, # 90 tab(s), 3 Refill(s), Type: Soft Stop, Pharmacy: [facility], 1 tab(s)  po daily,x90 day(s)   Problem list:    All Problems  Agatston coronary artery calcium score less than 100 / SNOMED CT 2241186903 / Confirmed  Dyslipidemia, goal LDL below 130 / SNOMED CT 42341297 / Confirmed  Family history of heart disease / SNOMED CT [id number] / Confirmed  Hearing loss in left ear / SNOMED CT 565984519 / Confirmed  Hypertension / SNOMED CT 0323242225 / Confirmed  Seasonal allergies / SNOMED CT 781444231 / Confirmed  Inactive: Low back pain / SNOMED CT 268988528      Histories   Past Medical History:    Active  Hypertension (9292445820)  Seasonal allergies (317088236)   Family History:    Hypertension  Mother ()  Hypothyroidism  Mother ()  Daughter (Aisha)  Myocardial infarction  Father ()  Tobacco abuse  Father ()  MI - Myocardial infarction  Father ()  Comments:  2013 5:04 PM - Petty Raymond  First MI late 40s.     Procedure history:    Cardiac computed tomography for calcium scoring (2284760095) on 3/25/2014 at 68 Years.  Comments:  2017 3:44 PM - Marya Morrison  Total Agatston calcium score is 62  Colonoscopy (981405188) on 2011 at 66 Years.  Comments:  2011 12:58 PM - Macario Frances MD  Negative. Repeat in 10 years.  Knee Arthrosopy in  at 62 Years.  Colonoscopy (805178765) on 3/15/1996 at 50 Years.  Comments:  3/31/2011 9:45 AM - Macario Frances MD  normal  Rectal biopsy ([id number]) on 3/15/1996 at 50 Years.  Appendectomy (606852567) in  at 15 Years.   Social History:        Alcohol Assessment: Current            Current, Daily, 2 drinks/episode average.  2 drinks/episode maximum.      Tobacco Assessment: Denies Tobacco Use            Never smoker      Substance Abuse Assessment: Denies Substance Abuse      Employment and Education Assessment            Employed, Work/School description: .      Home and Environment Assessment            Marital status: .  Spouse/Partner name:  Alona Simons.      Exercise and Physical Activity Assessment: Regular exercise        Physical Examination   Vital Signs   4/26/2018 10:11 AM CDT Peripheral Pulse Rate 66 bpm    Systolic Blood Pressure 118 mmHg    Diastolic Blood Pressure 74 mmHg    Mean Arterial Pressure 89 mmHg      Measurements from flowsheet : Measurements   4/26/2018 10:11 AM CDT Height Measured - Standard 69 in    Weight Measured - Standard 163 lb    BSA 1.9 m2    Body Mass Index 24.07 kg/m2      General:  Alert and oriented, No acute distress.    Eye:  Normal conjunctiva.    HENT:  Normocephalic, Normal hearing, Oral mucosa is moist, No pharyngeal erythema, No sinus tenderness.    Neck:  Supple, Non-tender, No carotid bruit, No lymphadenopathy, No thyromegaly.    Respiratory:  Lungs are clear to auscultation, Respirations are non-labored.    Cardiovascular:  Normal rate, Regular rhythm, No murmur, No gallop, Good pulses equal in all extremities, Normal peripheral perfusion, No edema.    Gastrointestinal:  Soft, Non-tender, Non-distended, No organomegaly.    Genitourinary:       Prostate: Exam declined after a discussion. Negative Evaluation at [facility] last year.    Lymphatics:  No lymphadenopathy neck, axilla, groin.    Musculoskeletal:  No deformity, Normal gait.    Integumentary:  No pallor.    Neurologic:  Normal sensory, Normal motor function, No focal deficits, Cranial Nerves II-XII are grossly intact.    Cognition and Speech:  Speech clear and coherent, Functional cognition intact.    Psychiatric:  Cooperative, Appropriate mood & affect, Normal judgment, Non-suicidal.       Review / Management   Results review:  Lab results   4/24/2018 8:06 AM CDT Sodium Level 140 mmol/L    Potassium Level 4.5 mmol/L    Chloride Level 104 mmol/L    CO2 Level 33 mmol/L  HI    Glucose Level 93 mg/dL    BUN 18 mg/dL    Creatinine Level 1.04 mg/dL    BUN/Creat Ratio NOT APPLICABLE    eGFR 71 mL/min/1.73m2    eGFR African American 83 mL/min/1.73m2    Calcium  Level 9.2 mg/dL    Cholesterol 220 mg/dL  HI    Non-HDL Cholesterol 132  HI    HDL 88 mg/dL    Cholesterol/HDL Ratio 2.5      HI    Triglyceride 64 mg/dL    PSA 0.8 ng/mL   .       Impression and Plan   Diagnosis     Family history of heart disease (JGA94-ID Z82.49).     Agatston coronary artery calcium score less than 100 (IFD23-WR R93.8).     Dyslipidemia, goal LDL below 130 (NUK72-SS E78.5).     Hypertension (INC15-RT I10).     Medicare annual wellness visit, subsequent (HHN26-UU Z00.00).     Patient Instructions:       Counseled: Patient, Diet, Activity, Verbalized understanding.    Summary:  BP controlled l and 10 year CV risk 17.5% discussed with patient as above..    Orders     Orders (Selected)   Outpatient Orders  Ordered  Return to Clinic (Request): RFV: Wellness exam, Return in 1 year  Return to Office (Request): RFV: BMP, Lipid, PSA q 12 mo  Return to Office (Request): RFV: Colonoscopy in 10 years  Prescriptions  Prescribed  hydroCHLOROthiazide 12.5 mg oral capsule: 1 cap(s) ( 12.5 mg ), po, daily, # 90 cap(s), 3 Refill(s), Type: Soft Stop, Pharmacy: [facility], 1 cap(s) po daily,x90 day(s)  lisinopril 40 mg oral tablet: 1 tab(s) ( 40 mg ), po, daily, # 90 tab(s), 3 Refill(s), Type: Soft Stop, Pharmacy: [facility], 1 tab(s) po daily,x90 day(s).     Will call if he wishes to start a statin.     Annual Flu.

## 2022-02-15 NOTE — PROGRESS NOTES
Patient:   CHANA BENITEZ            MRN: [id number]            FIN: [id number]               Age:   76 years     Sex:  Male     :  1945   Associated Diagnoses:   Encounter for Medicare annual wellness exam; Hypertension; Dyslipidemia, goal LDL below 130   Author:   Jason Kelley MD      Visit Information      Date of Service: 06/15/2021 01:36 pm  Performing Location: [facility]  Encounter#: [id number]      Primary Care Provider (PCP):  Macario Frances MD    NPI# [id number]      Referring Provider:  Jason Kelley MD    NPI# [id number]   Visit type:  Annual exam.       Chief Complaint   6/15/2021 1:40 PM CDT    AWV     Medicare Initial / Annual Preventative Visit      Well Adult History   Well Adult History             The patient presents for well adult exam.  ADL's and Safety were reviewed.  ____none__ found.  Please see copy of scanned form.    I have reviewed with the patient the completed health risk assessment and health history form and we have agreed on the following plans for identified risk factors. __none_____ found.  Please see copy of scanned form.  Also here for follow-up on his hypertension and his hyperlipidemia both doing well well controlled.  He is is now retired as an .  His wife Edna is in good health he lives independently outside of her falls.  He goes to exercise class IV times a week.  His adult children and grandchildren in good health.     .        Review of Systems   Eye:  See Preventative Services Checklist for Vision/Glaucoma Test dates..    Ear/Nose/Mouth/Throat:  Hearing is evaluated..    Psychiatric:  GDS  Noted.    See completed Health History for Review of Systems.      Health Status   Allergies:    Nonallergic Reactions (Selected)  Severity Not Documented  HYDROcodone (Nausea and nausea & vomiting.)  OxyCODONE (Nausea)   Medications:  (Selected)   Prescriptions  Prescribed  hydroCHLOROthiazide 12.5 mg oral capsule: = 1 cap(s) ( 12.5 mg ),  po, daily, # 90 cap(s), 0 Refill(s), Type: Soft Stop, Pharmacy: [facility], 1 cap(s) Oral daily,x90 day(s)  lisinopril 40 mg oral tablet: = 1 tab(s) ( 40 mg ), po, daily, # 90 tab(s), 0 Refill(s), Type: Soft Stop, Pharmacy: [facility], 1 tab(s) Oral daily,x90 day(s)  Documented Medications  Documented  tamsulosin 0.4 mg oral capsule: = 1 cap(s) ( 0.4 mg ), Oral, daily, Instructions: Rx'd by [facility], 0 Refill(s), Type: Maintenance  timolol: Oral, daily, Instructions[facility], 0 Refill(s), Type: Maintenance,    Medications          *denotes recorded medication          hydroCHLOROthiazide 12.5 mg oral capsule: 12.5 mg, 1 cap(s), po, daily, for 90 day(s), 90 cap(s), 0 Refill(s).          lisinopril 40 mg oral tablet: 40 mg, 1 tab(s), po, daily, for 90 day(s), 90 tab(s), 0 Refill(s).          *tamsulosin 0.4 mg oral capsule: 0.4 mg, 1 cap(s), Oral, daily, Rx'd by [facility], 0 Refill(s).          *timolol: Oral, daily, [facility], [facility] Refill(s).       Problem list:    All Problems  Dyslipidemia, goal LDL below 130 / SNOMED CT 03949163 / Confirmed  Family history of heart disease / SNOMED CT [id number] / Confirmed  Seasonal allergies / SNOMED CT 012527708 / Confirmed  H/O babesiosis / SNOMED CT 3685054933 / Confirmed  Agatston coronary artery calcium score less than 100 / SNOMED CT 6812542366 / Confirmed  Hearing loss in left ear / SNOMED CT 857952997 / Confirmed  Palpitations / SNOMED CT 529671166 / Confirmed  Hypertension / SNOMED CT 5671531892 / Confirmed  Inactive: Low back pain / SNOMED CT 613188660     Current Providers and Suppliers Noted in Demographic Area.      Histories   Past Medical History:    Active  Hypertension (9381520025)  Dyslipidemia, goal LDL below 130 (16088479)  Comments:  4/15/2014 CDT 11:01 AM Macario Callejas MD  10 year risk 14.8%. Moderate to high intensity statin indicated.    4/6/2017 CDT 2:49 PM Macario Callejas MD  10 year estimated risk  21%    2018 CDT 10:27 AM Macario Callejas MD  10 year estimated risk 17.5% improved to 14.1% with optimal care  Agatston coronary artery calcium score less than 100 (1764836639)  Comments:  10/16/2015 CDT 8:45 AM Macario Callejas MD  62  Hearing loss in left ear (210952805)  Seasonal allergies (588945620)  Palpitations (110211778)   Family History:    Hypertension  Mother (): onset at 60 .  Hypothyroidism  Mother ()  Daughter (Aisha)  Myocardial infarction  Father ()  Tobacco abuse  Father ()  MI - Myocardial infarction  Father ()  Comments:  2013 5:04 PM CST - Petty Raymond  First MI late 40s.     Procedure history:    Cardiac computed tomography for calcium scoring (SNOMED CT 7620690298) on 3/25/2014 at 68 Years.  Comments:  2017 3:44 PM CDT - Marya Morrison  Total Agatston calcium score is 62  Colonoscopy (SNOMED CT 982562841) on 2011 at 66 Years.  Comments:  2011 12:58 PM Macario Callejas MD  Negative. Repeat in 10 years.  Knee Arthrosopy in  at 62 Years.  Colonoscopy (SNOMED CT 077746874) on 3/15/1996 at 50 Years.  Comments:  3/31/2011 9:45 AM Macario Callejas MD  normal  Rectal biopsy (SNOMED CT 554446374) on 3/15/1996 at 50 Years.  Appendectomy (SNOMED CT 148991798) in  at 15 Years.   Social History:        Electronic Cigarette/Vaping Assessment            Electronic Cigarette Use: Never.      Alcohol Assessment: Current            Current, Daily, 2 drinks/episode average.  2 drinks/episode maximum.      Tobacco Assessment            Never (less than 100 in lifetime)      Substance Abuse Assessment: Denies Substance Abuse      Employment and Education Assessment            Employed, Work/School description: .      Home and Environment Assessment            Marital status: .  Spouse/Partner name: Alona Simons.  Living situation: Home/Independent.      Exercise and Physical Activity Assessment: Regular  exercise        Physical Examination   Exam at Provider Discretion   Vital Signs   6/15/2021 1:40 PM CDT Temperature Tympanic 97.9 DegF    Peripheral Pulse Rate 73 bpm    HR Method Electronic    Systolic Blood Pressure 135 mmHg  HI    Diastolic Blood Pressure 77 mmHg    Mean Arterial Pressure 96 mmHg    BP Method Electronic      Measurements from flowsheet : Measurements   6/15/2021 1:40 PM CDT Height Measured - Standard 69 in    Weight Measured - Standard 152.6 lb    BSA 1.83 m2    Body Mass Index 22.53 kg/m2      General:  Alert and oriented, No acute distress.    Eye:  Pupils are equal, round and reactive to light, Extraocular movements are intact.    HENT:  Tympanic membranes are clear, No pharyngeal erythema.    Neck:  Supple, Non-tender, No carotid bruit, No lymphadenopathy, No thyromegaly.    Respiratory:  Lungs are clear to auscultation, Respirations are non-labored.    Cardiovascular:  Normal rate, Regular rhythm, No murmur, Normal peripheral perfusion, No edema.    Gastrointestinal:  Soft, Non-tender, No organomegaly.    Musculoskeletal:  No tenderness, No swelling.    Integumentary:  No rash.    Neurologic:  No signs of cognitive impairment..    Psychiatric:  Patient's GDS and CAGE questionnaire reviewed and discussed with patient..       Review / Management   Results review:  INCLUDE PHQ 9 0.       Impression and Plan   Diagnosis     Encounter for Medicare annual wellness exam (WVV02-JW Z00.00).     Hypertension (ONW96-KP I10).     Dyslipidemia, goal LDL below 130 (CPX21-FL E78.5).     Course:  Progressing as expected.    Plan:  Please list plan for positive findings, treatment options, risk vs. benefits.  Problem #1 hypertension under good control with hydrochlorothiazide and lisinopril discussed getting his own blood pressure cuff at home and tracking his blood pressures 6-month follow-up  2.  Hyperlipidemia he did have a trial of statins in the past but has declined any other cardiac calcium score but  that was slightly elevated by #3 BPH followed by urology on Flomax  4.  Glaucoma followed by ophthalmology twice yearly on timolol  .    Patient Instructions:       Counseled: Patient, Discussed preventative services including  Screening for AAA (Family history or male 65-70 who has smoked more than 100 cigarettes in a lifetime.), Lipid screening, Diabetes screening, Nutrition, Bone mass, Cancer screening (cervical, breast, colon), Immunizations (flu, Pneumovax, Hep. B, Zostavax), Glaucoma screening and ECG if needed.  Appropriate weight and diet discussed.  Discussed Advance Life Directives.    Preventative services checklist reviewed, updated and copy given to patient.  Please see scanned document.       .       Professional Services   Counseling Summary:  Total time spent with the patient and coordination of care:  _  minutes.

## 2022-02-15 NOTE — TELEPHONE ENCOUNTER
"---------------------From: David SRIVASTAVA, Zo CORDERO ([address] Pool ([address] [place] [address]) ) To: [facility] Message Pool ([address] [place] [address]);   Sent: 12/31/2019 9:19:54 AM CSTSubject: Verification needed Patient has lab appointment per JDL on 1/2/20 and office visit on 1/6/20.RTC in chart states \"Return in 2 weeks\".Pt had potassium and CBC done 12/18/19. Results letter does not mention labs to do again, just to see JDL.Would you like patient to have labs done before office visit?If so, which ones?---------------------From: Darling Oscar ([facility] Message Pool ([address] [place] [address])) To: Macario Frances MD;   Sent: 12/31/2019 9:21:52 AM CSTSubject: FW: Verification needed---------------------From: Macario Frances MD To: [facility] Message Pool ([address] [place] [address]);   Sent: 12/31/2019 9:50:12 AM CSTSubject: RE: Verification needed Just an office visit.---------------------From: Darling Oscar ([facility] Message Pool ([address] [place] [address])) To: [address] ([address] [place] [address]) ;   Sent: 12/31/2019 9:59:25 AM CSTSubject: FW: Verification neededCall to patient at 1231LVM no labs needed and to call and cancel it but keep OV with JDL.  "

## 2022-02-15 NOTE — NURSING NOTE
Comprehensive Intake Entered On:  12/12/2019 2:52 PM CST    Performed On:  12/12/2019 2:47 PM CST by Darling Oscar               Summary   Chief Complaint :   Patient is here today c/o persisent coughing, very little energy, headaches, night sweats, c/o rash on chest and back x 1 week    Advance Directive :   Yes   Weight Measured :   152.04 lb(Converted to: 152 lb 1 oz, 68.96 kg)    Height Measured :   69 in(Converted to: 5 ft 9 in, 175.26 cm)    Body Mass Index :   22.45 kg/m2   Body Surface Area :   1.83 m2   Systolic Blood Pressure :   118 mmHg   Diastolic Blood Pressure :   66 mmHg   Mean Arterial Pressure :   83 mmHg   Peripheral Pulse Rate :   81 bpm   BP Site :   Right arm   BP Method :   Manual   HR Method :   Electronic   Temperature Tympanic :   98.4 DegF(Converted to: 36.9 DegC)    Darling Oscar - 12/12/2019 2:47 PM CST   Health Status   Allergies Verified? :   Yes   Medication History Verified? :   Yes   Medical History Verified? :   Yes   Pre-Visit Planning Status :   Completed   Tobacco Use? :   Never smoker   Darling Oscar - 12/12/2019 2:47 PM CST   Consents   Consent for Immunization Exchange :   Consent Granted   Consent for Immunizations to Providers :   Consent Granted   Darling Oscar - 12/12/2019 2:47 PM CST   Meds / Allergies   (As Of: 12/12/2019 2:52:36 PM CST)   Allergies (Active)   HYDROcodone  Estimated Onset Date:   Unspecified ; Reactions:   nausea, Nausea & vomiting. ; Created By:   Macario Frances MD; Reaction Status:   Active ; Category:   Drug ; Substance:   HYDROcodone ; Type:   Intolerance ; Updated By:   Macario Frances MD; Reviewed Date:   12/12/2019 2:50 PM CST      oxyCODONE  Estimated Onset Date:   <not entered> 8/3/2012 ; Reactions:   Nausea ; Created By:   Macario Frances MD; Reaction Status:   Active ; Category:   Drug ; Substance:   oxyCODONE ; Type:   Side Effect ; Updated By:   Macario Frances MD; Reviewed Date:   12/12/2019 2:50 PM CST         Medication List   (As Of: 12/12/2019 2:52:36 PM CST)   Prescription/Discharge Order    doxycycline  :   doxycycline ; Status:   Processing ; Ordered As Mnemonic:   doxycycline monohydrate 100 mg oral capsule ; Ordering Provider:   Jason Kelley MD; Action Display:   Complete ; Catalog Code:   doxycycline ; Order Dt/Tm:   12/12/2019 2:50:40 PM CST          fluticasone nasal  :   fluticasone nasal ; Status:   Processing ; Ordered As Mnemonic:   Flonase 50 mcg/inh nasal spray ; Ordering Provider:   Jason Kelley MD; Action Display:   Complete ; Catalog Code:   fluticasone nasal ; Order Dt/Tm:   12/12/2019 2:50:55 PM CST          lisinopril  :   lisinopril ; Status:   Prescribed ; Ordered As Mnemonic:   lisinopril 40 mg oral tablet ; Simple Display Line:   40 mg, 1 tab(s), po, daily, for 90 day(s), 90 tab(s), 3 Refill(s) ; Ordering Provider:   Macario Frances MD; Catalog Code:   lisinopril ; Order Dt/Tm:   4/29/2019 12:42:00 PM CDT          hydroCHLOROthiazide  :   hydroCHLOROthiazide ; Status:   Prescribed ; Ordered As Mnemonic:   hydroCHLOROthiazide 12.5 mg oral capsule ; Simple Display Line:   12.5 mg, 1 cap(s), po, daily, for 90 day(s), 90 cap(s), 3 Refill(s) ; Ordering Provider:   Macario Frances MD; Catalog Code:   hydroCHLOROthiazide ; Order Dt/Tm:   4/29/2019 12:41:59 PM CDT

## 2022-02-15 NOTE — TELEPHONE ENCOUNTER
---------------------  From: CHANA BENITEZ  To: [facility]  Sent: 06/18/2021 11:34 a.m. CDT  Subject: FW: General Message  Received report.  Thanks.  ---------------------  From: Jason Kelley MD  To: CHANA BENITEZ  Sent: 6/16/2021 8:40:35 AM CDT  Subject: General Message  ???  All labs acceptable.  Please let us know you received this message by either selecting Forward or Reply at the top.? Thank you  ???  ???  Results:  Date Result Name Ind Value Ref Range   6/15/2021 2:15 PM Potassium Level ??? 4.2 mmol/L (3.5 - 5.3)   6/15/2021 2:15 PM Glucose Level ((H)) 129 mg/dL (65 - 99)   6/15/2021 2:15 PM Creatinine Level ??? 1.09 mg/dL (0.70 - 1.18)   6/15/2021 2:15 PM Cholesterol ((H)) 222 mg/dL ( - <200)   6/15/2021 2:15 PM HDL ??? 83 mg/dL (> OR = 40 - )   6/15/2021 2:15 PM LDL ((H)) 117 ???   6/15/2021 2:15 PM Triglyceride ??? 108 mg/dL ( - <150)   6/15/2021 2:15 PM PSA ??? 1.0 ng/mL ( - < OR = 4.0)

## 2022-02-15 NOTE — LETTER
(Inserted Image. Unable to display)   June 29, 2021      CHANA BENITEZ   [address] [address]        Dear CHANA,      Thank you for selecting [facility] for your healthcare needs.      The results of the Cologuard stool test was received and your result was negative.      A negative result indicates a lower likelihood that colorectal cancer or advanced adenoma (pre-cancer) is present.  Periodic Colorectal Cancer Screenings are recommended in the future approximately every 3 years.           Please contact me or my assistant at [phone] if you have any questions or concerns.     Sincerely,        Jason Kleley MD

## 2022-02-15 NOTE — PROGRESS NOTES
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 Medication refill 2. c/o rash on stomach x 2 weeks.        History of Present Illness   Patient is here for 6-month follow-up.  He has not been checking his blood pressure he is taking his medications as directed.  He did develop a rash on his abdomen that he had before when he works out and usually responds to steroid cream         Review of Systems   Constitutional:  Negative except as documented in history of present illness.    Eye:  Negative except as documented in history of present illness.    Ear/Nose/Mouth/Throat:  Negative.    Respiratory:  Negative.    Cardiovascular:  Negative.    Gastrointestinal:  Negative.    Genitourinary:  Negative except as documented in history of present illness.    Musculoskeletal:  Negative.    Integumentary:  Negative except as documented in history of present illness.    Neurologic:  Negative.       Health Status   Allergies:    Nonallergic Reactions (Selected)  Severity Not Documented  HYDROcodone (Nausea and nausea & vomiting.)  OxyCODONE (Nausea)   Medications:  (Selected)   Prescriptions  Prescribed  hydroCHLOROthiazide 12.5 mg oral capsule: = 1 cap(s), Oral, daily, Instructions: dose decrease., # 90 cap(s), 1 Refill(s), Pharmacy: [facility], 1 cap(s) Oral daily,Instr:dose decrease., 69, in, 21 10:23:00 CST, Height Measured, 160.4, lb,  12/09/21 10:23:00 CST, Weight Measured  lisinopril 40 mg oral tablet: = 1 tab(s), Oral, daily, # 90 tab(s), 1 Refill(s), Pharmacy: [facility], 1 tab(s) Oral daily, 69, in, 12/09/21 10:23:00 CST, Height Measured, 160.4, lb, 12/09/21 10:23:00 CST, Weight Measured  tamsulosin 0.4 mg oral capsule: = 1 cap(s), Oral, daily, # 90 EA, 3 Refill(s), Type: Maintenance, Pharmacy: [facility], TAKE ONE CAPSULE BY MOUTH ONCE DAILY, 69, in, 06/15/21 13:40:00 CDT, Height Measured, 152.6, lb, 06/15/21 13:40:00 CDT, Weight Measured  triamcinolone 0.1% topical cream: 1 ariel, Topical, tid, Instructions: apply a thin film  to affected area, # 60 gm, 6 Refill(s), Type: Maintenance, Pharmacy: [facility], 1 ariel Topical tid,Instr:apply a thin film; to affected area, 69, in, 12/09/21 10:23:00 CST, Height Measured, 160.4...  Documented Medications  Documented  timolol: Oral, daily, Instructions: Rx'd by Opthamology, 0 Refill(s), Type: Maintenance,    Medications          *denotes recorded medication          hydroCHLOROthiazide 12.5 mg oral capsule: 1 cap(s), Oral, daily, dose decrease., 90 cap(s), 1 Refill(s).          lisinopril 40 mg oral tablet: 1 tab(s), Oral, daily, 90 tab(s), 1 Refill(s).          tamsulosin 0.4 mg oral capsule: 1 cap(s), Oral, daily, 90 EA, 3 Refill(s).          *timolol: Oral, daily, Rx'd by Opthamology, 0 Refill(s).          triamcinolone 0.1% topical cream: 1 ariel, Topical, tid, apply a thin film  to affected area, 60 gm, 6 Refill(s).       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 Moderate to high intensity statin indicated.    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  Hypothyroidism  Mother ()  Daughter (Aisha)  Myocardial infarction  Father ()  Tobacco abuse  Father ()  MI - Myocardial infarction  Father ()  Comments:  2013 5:04 PM CST - Petty Raymond  First MI late 40s.     Procedure history:    Screening for malignant neoplasm of colon (SNOMED CT 6516689432) on 2021 at 76 Years.  Comments:  2021 9:26 AM CDT - Felecia Nicole - Negative  Cardiac computed tomography for calcium scoring (SNOMED CT 0854316938) on 3/25/2014 at 68 Years.  Comments:  2017 3:44 PM CDT - Marya Morrison  Total Agatston calcium score is 62  Colonoscopy (SNOMED CT 322050655) on 2011 at 66 Years.  Comments:  2011 12:58 PM Macario Callejas MD  Negative. Repeat in 10 years.  Knee Arthrosopy in  at 62 Years.  Colonoscopy (SNOMED CT 504632130) on 3/15/1996 at 50 Years.  Comments:  3/31/2011 9:45 AM Macario Callejas MD  normal  Rectal biopsy (SNOMED CT 943884267) on 3/15/1996 at 50 Years.  Appendectomy (SNOMED CT 457106069) in 1960 at 15 Years.   Social History:        Electronic Cigarette/Vaping Assessment             Electronic Cigarette Use: Never.      Alcohol Assessment: Current            Current, Daily, 2 drinks/episode average.  2 drinks/episode maximum.      Tobacco Assessment            Never (less than 100 in lifetime)      Substance Abuse Assessment: Denies Substance Abuse      Employment and Education Assessment            Employed, Work/School description: .      Home and Environment Assessment            Marital status: .  Spouse/Partner name: Alona Simons.  Living situation: Home/Independent.      Exercise and Physical Activity Assessment: Regular exercise        Physical Examination   Vital Signs   12/9/2021 10:23 AM CST Temperature Tympanic 97.2 DegF  LOW    Peripheral Pulse Rate 60 bpm    Systolic Blood Pressure 153 mmHg  HI    Diastolic Blood Pressure 91 mmHg  HI    Mean Arterial Pressure 112 mmHg    BP Site Right arm    BP Method Electronic    Oxygen Saturation 98 %      Measurements from flowsheet : Measurements   12/9/2021 10:23 AM CST Height Measured - Standard 69 in    Weight Measured - Standard 160.4 lb    BSA 1.88 m2    Body Mass Index 23.68 kg/m2      General:  Alert and oriented, No acute distress.    Eye:  Pupils are equal, round and reactive to light, Extraocular movements are intact.    Neck:  Supple, Non-tender, No carotid bruit, No lymphadenopathy.    Respiratory:  Lungs are clear to auscultation, Respirations are non-labored.    Cardiovascular:  Normal rate, Regular rhythm, No murmur, No edema.    Gastrointestinal:  Soft, Non-tender.    Integumentary:  eczema abdomen.    Neurologic:  Alert, Oriented.       Health Maintenance      Recommendations     Pending (in the next year)        OverDue           Eye Exam due  06/01/13  and every 1  year(s)           Influenza Vaccine due  09/01/21  and every 1  year(s)        Due            Fall Risk Screen due  12/09/21  and every 1  year(s)           Hepatitis C Screen [id number] due  12/09/21  One-time only           Type 2 Diabetes  Mellitus Screen due  12/09/21  Variable frequency        Due In Future            Depression Screen not due until  06/15/22  and every 1  year(s)           Lipid Disorders Screen not due until  06/15/22  and every 1  year(s)     Satisfied (in the past 1 year)        Satisfied            Body Mass Index Check on  12/09/21.           Body Mass Index Check on  06/15/21.           COVID-19 Vaccine (Moderna) Dose 2 on  04/14/21.           COVID-19 Vaccine (Moderna) Dose 1 on  03/17/21.           Depression Screen on  06/15/21.           High Blood Pressure Screen on  12/09/21.           High Blood Pressure Screen on  06/15/21.           High Blood Pressure Screen on  05/12/21.           Lipid Disorders Screen on  06/15/21.           Lipid Disorders Screen on  06/15/21.           Lipid Disorders Screen on  06/15/21.           Lipid Disorders Screen on  06/15/21.           Tobacco Use Screen on  12/09/21.           Tobacco Use Screen on  11/01/21.           Tobacco Use Screen on  05/12/21.          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     Dyslipidemia, goal LDL below 130 (DRZ00-GH E78.5).     Hypertension (JLY01-ZD I10).     Plan:  Patient with hypertension suboptimally controlled with hydrochlorothiazide lisinopril he declines additional medication he will start checking his home readings at home  2.  Hyperlipidemia we reviewed risk given his positive cardiac calcium.  Declined statins  3.  BPH on Flomax  4.  Eczema we renewed his transdermal cream  5.  Glaucoma on timolol  .

## 2022-02-15 NOTE — PROGRESS NOTES
Patient:   CHANA BENITEZ            MRN: [id number]            FIN: [id number]               Age:   74 years     Sex:  Male     :  1945   Associated Diagnoses:   None   Author:   Rajesh Carballo MD      Visit Information      Referring Provider:  Jason Kelley MD# [id number]      Chief Complaint   10/25/2019 11:56 AM CDT  had a cold over the summer and now smell and taste are not back to normal        History of Present Illness   Asked to be seen by Dr. Kelley for evaluation of loss of smell. Patient reports that he has lost his taste and smell after symptoms of a cold had cleared up. He was plugged for a week or two and then the symptoms went away. He was left with no taste or smell. He was prescribed intranasal steroid and oral steroid without improvement in symptoms. He can taste salt and sweet. He isn't getting any other taste. He also detects an unpleasant odor that is rancid or metallic. He can't smell coffee beans. No head trauma. He does have allergies.       Review of Systems   Constitutional:  Negative.    Ear/Nose/Mouth/Throat:  Negative except as documented in history of present illness.    Respiratory:  Negative.       Health Status   Allergies:    Nonallergic Reactions (Selected)  Severity Not Documented  HYDROcodone (Nausea and nausea & vomiting.)  OxyCODONE (Nausea)   Medications:  (Selected)   Prescriptions  Prescribed  Flonase 50 mcg/inh nasal spray: = 2 spray(s), Nasal, daily, # 1 EA, 6 Refill(s), Type: Maintenance, Pharmacy: [facility], 2 spray(s) Nasal daily  hydroCHLOROthiazide 12.5 mg oral capsule: = 1 cap(s) ( 12.5 mg ), po, daily, # 90 cap(s), 3 Refill(s), Type: Soft Stop, Pharmacy: [facility], 1 cap(s) Oral daily,x90 day(s)  lisinopril 40 mg oral tablet: = 1 tab(s) ( 40 mg ), po, daily, # 90 tab(s), 3 Refill(s), Type: Soft Stop, Pharmacy: [facility], 1 tab(s) Oral daily,x90 day(s)   Problem list:    All Problems (Selected)  Agatston coronary artery calcium score less  than 100 / SNOMED CT 2284589247 / Confirmed  Family history of heart disease / SNOMED CT [id number] / Confirmed  Dyslipidemia, goal LDL below 130 / SNOMED CT 00956734 / Confirmed  Hypertension / SNOMED CT 8658796064 / Confirmed  Palpitations / SNOMED CT 481550372 / Confirmed  Seasonal allergies / SNOMED CT 193902511 / Confirmed  Hearing loss in left ear / SNOMED CT 278229783 / Confirmed      Histories   Past Medical History:    Active  Hypertension (1171357309)  Dyslipidemia, goal LDL below 130 (22869181)  Comments:  4/15/2014 CDT 11:01 AM Macario Callejas MD  10 year risk 14.8%. Moderate to high intensity statin indicated.    2017 CDT 2:49 PM Macario Callejas MD  10 year estimated risk 21%    2018 CDT 10:27 AM Macario Callejas MD  10 year estimated risk 17.5% improved to 14.1% with optimal care  Agatston coronary artery calcium score less than 100 (6551738066)  Comments:  10/16/2015 CDT 8:45 AM Macario Callejas MD  62  Hearing loss in left ear (771167586)  Seasonal allergies (082694000)  Palpitations (595842361)   Family History:    Hypertension  Mother (): onset at 60 .  Hypothyroidism  Mother ()  Daughter (Aisha)  Myocardial infarction  Father ()  Tobacco abuse  Father ()  MI - Myocardial infarction  Father ()  Comments:  2013 5:04 PM CST - Petty Raymond  First MI late 40s.     Procedure history:    Cardiac computed tomography for calcium scoring (0480615790) on 3/25/2014 at 68 Years.  Comments:  2017 3:44 PM CDT - Marya Morrison  Total Agatston calcium score is 62  Colonoscopy (407038836) on 2011 at 66 Years.  Comments:  2011 12:58 PM Macario Callejas MD  Negative. Repeat in 10 years.  Knee Arthrosopy in  at 62 Years.  Colonoscopy (101483783) on 3/15/1996 at 50 Years.  Comments:  3/31/2011 9:45 AM CDT - Juan Daniel WHYTE, Macario  normal  Rectal biopsy (798536571) on 3/15/1996 at 50  Years.  Appendectomy (979231837) in 1960 at 15 Years.      Physical Examination   Vital Signs   10/25/2019 11:56 AM CDT  Temperature Tympanic      97.2 DegF  LOW     Measurements from flowsheet : Measurements   10/25/2019 11:56 AM CDT  Height Measured - Standard                69 in     CONSTITUTIONAL  General Appearance:  Normal, well developed, well nourished, no obvious distress  Ability to Communicate:  communicates appropriately.    HEAD AND FACE  Appearance and Symmetry:  Normal, no scalp or facial scarring or suspicious lesions.  Paranasal sinuses tenderness:  Normal, Paranasal sinuses non tender    EARS  Clinical speech reception threshold:  Normal, able to hear normal speech.  Auricle:  Normal, Auricles without scars, lesions, masses.  External auditory canal:  Normal, External auditory canal normal.  Tympanic membrane:  Normal, Tympanic membranes normal without swelling or erythema.    NOSE (speculum or scope)  Architecture:  Normal, Grossly normal external nasal architecture with no masses or lesions.  Mucosa:  Normal mucosa, No polyps or masses.  Septum:  Normal, Septum non-obstructing.  Turbinates:  Normal, No turbinate abnormalities    ORAL CAVITY AND OROPHARYNX  Lips:  Normal.  Dental and gingiva:  Normal, No obvious dental or gingival disease.  Mucosa:  Normal, Moist mucous membranes.  Tongue:  Normal, Tongue mobile with no mucosal abnormalities  Hard and soft palate:  Normal, Hard and soft palate without cleft or mucosal lesions.  Oral pharynx:  Normal, Posterior pharynx without lesions or remarkable asymmetry.  Saliva:  Normal, Clear saliva.  Masses:  Normal, No palpable masses or pathologically enlarged lymph nodes.    NECK  Masses/lymph nodes:  Normal, No worrisome neck masses or lymph nodes.  Salivary glands:  Normal, Parotid and submandibular glands.  Trachea and larynx position:  Normal, Trachea and larynx midline.  Thyroid:  Normal, No thyroid abnormality.  Tenderness:  Normal, No cervical  tenderness.  Suppleness:  Normal, Neck supple    NEUROLOGICAL  Speech pattern:  Normal, Proasaic    RESPIRATORY  Symmetry and Respiratory effort:  Normal, Symmetric chest movement and expansion with no increased intercostal retractions or use of accessory muscles.       Impression and Plan   Loss of smell and taste after viral infection. I explained that it is likely to come back but can take up to a year to do so. I advised MR head to ensure that there is not evidence of intracranial pathology.

## 2022-06-01 DIAGNOSIS — I10 ESSENTIAL HYPERTENSION: Primary | ICD-10-CM

## 2022-06-01 RX ORDER — AMLODIPINE BESYLATE 5 MG/1
TABLET ORAL
Qty: 30 TABLET | Refills: 0 | Status: SHIPPED | OUTPATIENT
Start: 2022-06-01 | End: 2022-06-29

## 2022-06-01 NOTE — TELEPHONE ENCOUNTER
Last Written Prescription Date:  12/16/21  Last Fill Quantity: 90,  # refills: 1   Last office visit: 12/16/21, HTN  Future Office Visit:  Due June 2022 for fasting labs and medication(s) check    Prescription approved per [facility] Refill Protocol.30 days.    TC: please contact patient to make lab and visit appointment             Zo Hernandez RN

## 2022-06-03 ENCOUNTER — DOCUMENTATION ONLY (OUTPATIENT)
Dept: LAB | Facility: CLINIC | Age: 77
End: 2022-06-03
Payer: MEDICARE

## 2022-06-03 DIAGNOSIS — Z13.220 SCREENING CHOLESTEROL LEVEL: ICD-10-CM

## 2022-06-03 DIAGNOSIS — I10 BENIGN ESSENTIAL HYPERTENSION: Primary | ICD-10-CM

## 2022-06-20 ENCOUNTER — LAB (OUTPATIENT)
Dept: LAB | Facility: CLINIC | Age: 77
End: 2022-06-20
Payer: MEDICARE

## 2022-06-20 DIAGNOSIS — Z13.220 SCREENING CHOLESTEROL LEVEL: ICD-10-CM

## 2022-06-20 DIAGNOSIS — I10 BENIGN ESSENTIAL HYPERTENSION: ICD-10-CM

## 2022-06-20 LAB
ANION GAP SERPL CALCULATED.3IONS-SCNC: 5 MMOL/L (ref 3–14)
BUN SERPL-MCNC: 16 MG/DL (ref 7–30)
CALCIUM SERPL-MCNC: 9.1 MG/DL (ref 8.5–10.1)
CHLORIDE BLD-SCNC: 106 MMOL/L (ref 94–109)
CHOLEST SERPL-MCNC: 209 MG/DL
CO2 SERPL-SCNC: 29 MMOL/L (ref 20–32)
CREAT SERPL-MCNC: 0.9 MG/DL (ref 0.66–1.25)
FASTING STATUS PATIENT QL REPORTED: YES
GFR SERPL CREATININE-BSD FRML MDRD: 88 ML/MIN/1.73M2
GLUCOSE BLD-MCNC: 94 MG/DL (ref 70–99)
HDLC SERPL-MCNC: 80 MG/DL
LDLC SERPL CALC-MCNC: 110 MG/DL
NONHDLC SERPL-MCNC: 129 MG/DL
POTASSIUM BLD-SCNC: 4 MMOL/L (ref 3.4–5.3)
SODIUM SERPL-SCNC: 140 MMOL/L (ref 133–144)
TRIGL SERPL-MCNC: 96 MG/DL

## 2022-06-20 PROCEDURE — 80048 BASIC METABOLIC PNL TOTAL CA: CPT

## 2022-06-20 PROCEDURE — 36415 COLL VENOUS BLD VENIPUNCTURE: CPT

## 2022-06-20 PROCEDURE — 80061 LIPID PANEL: CPT

## 2022-06-21 RX ORDER — ROSUVASTATIN CALCIUM 10 MG/1
10 TABLET, COATED ORAL DAILY
COMMUNITY
Start: 2021-12-16 | End: 2022-06-29

## 2022-06-21 RX ORDER — TRIAMCINOLONE ACETONIDE 1 MG/G
1 CREAM TOPICAL 3 TIMES DAILY
COMMUNITY
Start: 2021-12-09 | End: 2023-03-30

## 2022-06-21 RX ORDER — TAMSULOSIN HYDROCHLORIDE 0.4 MG/1
0.4 CAPSULE ORAL DAILY
COMMUNITY
Start: 2020-07-01 | End: 2022-06-29

## 2022-06-21 RX ORDER — LISINOPRIL 40 MG/1
40 TABLET ORAL DAILY
COMMUNITY
Start: 2021-05-07 | End: 2022-06-29

## 2022-06-24 ENCOUNTER — DOCUMENTATION ONLY (OUTPATIENT)
Dept: OTHER | Facility: CLINIC | Age: 77
End: 2022-06-24

## 2022-06-27 PROBLEM — N40.0 BENIGN PROSTATIC HYPERPLASIA WITHOUT URINARY OBSTRUCTION: Status: ACTIVE | Noted: 2020-07-01

## 2022-06-27 PROBLEM — B96.89 BACTERIAL INFECTION OF SKIN: Status: RESOLVED | Noted: 2017-02-23 | Resolved: 2022-06-27

## 2022-06-27 PROBLEM — J30.2 SEASONAL ALLERGIC RHINITIS: Status: ACTIVE | Noted: 2022-06-27

## 2022-06-27 PROBLEM — L08.9 BACTERIAL INFECTION OF SKIN: Status: RESOLVED | Noted: 2017-02-23 | Resolved: 2022-06-27

## 2022-06-27 PROBLEM — Z86.19: Status: ACTIVE | Noted: 2022-06-27

## 2022-06-27 PROBLEM — R93.89 ABNORMAL COMPUTED TOMOGRAPHY SCAN: Status: ACTIVE | Noted: 2022-06-27

## 2022-06-27 PROBLEM — E78.5 HYPERLIPIDEMIA: Status: ACTIVE | Noted: 2020-07-02

## 2022-06-27 PROBLEM — H91.92 HEARING LOSS IN LEFT EAR: Status: ACTIVE | Noted: 2022-06-27

## 2022-06-27 PROBLEM — R00.2 PALPITATIONS: Status: ACTIVE | Noted: 2022-06-27

## 2022-06-29 ENCOUNTER — OFFICE VISIT (OUTPATIENT)
Dept: FAMILY MEDICINE | Facility: CLINIC | Age: 77
End: 2022-06-29
Payer: MEDICARE

## 2022-06-29 VITALS
WEIGHT: 155 LBS | DIASTOLIC BLOOD PRESSURE: 88 MMHG | SYSTOLIC BLOOD PRESSURE: 160 MMHG | HEART RATE: 58 BPM | BODY MASS INDEX: 22.89 KG/M2

## 2022-06-29 DIAGNOSIS — I10 BENIGN ESSENTIAL HYPERTENSION: Primary | ICD-10-CM

## 2022-06-29 DIAGNOSIS — N40.0 BENIGN PROSTATIC HYPERPLASIA WITHOUT URINARY OBSTRUCTION: ICD-10-CM

## 2022-06-29 DIAGNOSIS — E78.00 PURE HYPERCHOLESTEROLEMIA: ICD-10-CM

## 2022-06-29 DIAGNOSIS — H90.5 SENSORINEURAL HEARING LOSS (SNHL) OF LEFT EAR, UNSPECIFIED HEARING STATUS ON CONTRALATERAL SIDE: ICD-10-CM

## 2022-06-29 DIAGNOSIS — I10 ESSENTIAL HYPERTENSION: ICD-10-CM

## 2022-06-29 PROCEDURE — 99214 OFFICE O/P EST MOD 30 MIN: CPT | Performed by: FAMILY MEDICINE

## 2022-06-29 RX ORDER — TIMOLOL MALEATE 2.5 MG/ML
1 SOLUTION OPHTHALMIC DAILY
COMMUNITY
End: 2023-10-03

## 2022-06-29 RX ORDER — LISINOPRIL 40 MG/1
40 TABLET ORAL DAILY
Qty: 90 TABLET | Refills: 1 | Status: SHIPPED | OUTPATIENT
Start: 2022-06-29 | End: 2023-01-02

## 2022-06-29 RX ORDER — AMLODIPINE BESYLATE 5 MG/1
5 TABLET ORAL DAILY
Qty: 90 TABLET | Refills: 1 | Status: SHIPPED | OUTPATIENT
Start: 2022-06-29 | End: 2023-01-02

## 2022-06-29 RX ORDER — ROSUVASTATIN CALCIUM 10 MG/1
10 TABLET, COATED ORAL DAILY
Qty: 90 TABLET | Refills: 1 | Status: SHIPPED | OUTPATIENT
Start: 2022-06-29 | End: 2023-01-02

## 2022-06-29 RX ORDER — TAMSULOSIN HYDROCHLORIDE 0.4 MG/1
0.4 CAPSULE ORAL DAILY
Qty: 90 CAPSULE | Refills: 1 | Status: SHIPPED | OUTPATIENT
Start: 2022-06-29 | End: 2023-01-02

## 2022-06-29 NOTE — PROGRESS NOTES
Assessment & Plan     Benign essential hypertension  Under poor control he also track his blood pressures at home let us know next month pressures are up we will increase his amlodipine  - amLODIPine (NORVASC) 5 MG tablet; Take 1 tablet (5 mg) by mouth daily  - lisinopril (ZESTRIL) 40 MG tablet; Take 1 tablet (40 mg) by mouth daily    Pure hypercholesterolemia  He will resume his Crestor as he is not taking it for several months  - rosuvastatin (CRESTOR) 10 MG tablet; Take 1 tablet (10 mg) by mouth daily    Sensorineural hearing loss (SNHL) of left ear, unspecified hearing status on contralateral side  He is getting hearing aids    Essential hypertension  As above  - amLODIPine (NORVASC) 5 MG tablet; Take 1 tablet (5 mg) by mouth daily    Benign prostatic hyperplasia without urinary obstruction  Controlled  - tamsulosin (FLOMAX) 0.4 MG capsule; Take 1 capsule (0.4 mg) by mouth daily                 Return in about 6 months (around 12/29/2022) for Follow up.    Jason Kelley MD  [facility]    Lonnie Merlos is a 77 year old, presenting for the following health issues: Overall he is doing well.  He is retired .  His wife is in good health.  Stays very active.  Has 2 glasses of wine a day.  Follow Up (Lab Work and Refill Medications)      History of Present Illness       Hypertension: He presents for follow up of hypertension.  He does not check blood pressure  regularly outside of the clinic.  He does not follow a low salt diet.         Hypertension Follow-up      How many servings of fruits and vegetables do you eat daily?  2-3    On average, how many sweetened beverages do you drink each day (Examples: soda, juice, sweet tea, etc.  Do NOT count diet or artificially sweetened beverages)?   0    How many days per week do you exercise enough to make your heart beat faster? 4    How many minutes a day do you exercise enough to make your heart beat faster? 60 or more    How  many days per week do you miss taking your medication? 0        Review of Systems   Constitutional, HEENT, cardiovascular, pulmonary, gi and gu systems are negative, except as otherwise noted.      Objective    BP (!) 160/88 (BP Location: Right arm, Patient Position: Sitting, Cuff Size: Adult Regular)   Pulse 58   Wt 70.3 kg (155 lb)   BMI 22.89 kg/m    Body mass index is 22.89 kg/m .  Physical Exam   GENERAL: healthy, alert and no distress  NECK: no adenopathy, no asymmetry, masses, or scars and thyroid normal to palpation  RESP: lungs clear to auscultation - no rales, rhonchi or wheezes  CV: regular rate and rhythm, normal S1 S2, no S3 or S4, no murmur, click or rub, no peripheral edema and peripheral pulses strong  ABDOMEN: soft, nontender, no hepatosplenomegaly, no masses and bowel sounds normal  MS: no gross musculoskeletal defects noted, no edema    Lab on 06/20/2022   Component Date Value Ref Range Status     Sodium 06/20/2022 140  133 - 144 mmol/L Final     Potassium 06/20/2022 4.0  3.4 - 5.3 mmol/L Final     Chloride 06/20/2022 106  94 - 109 mmol/L Final     Carbon Dioxide (CO2) 06/20/2022 29  20 - 32 mmol/L Final     Anion Gap 06/20/2022 5  3 - 14 mmol/L Final     Urea Nitrogen 06/20/2022 16  7 - 30 mg/dL Final     Creatinine 06/20/2022 0.90  0.66 - 1.25 mg/dL Final     Calcium 06/20/2022 9.1  8.5 - 10.1 mg/dL Final     Glucose 06/20/2022 94  70 - 99 mg/dL Final     GFR Estimate 06/20/2022 88  >60 mL/min/1.73m2 Final    Effective December 21, 2021 eGFRcr in adults is calculated using the 2021 CKD-EPI creatinine equation which includes age and gender (Lali et al., NEJM, DOI: 10.1056/YQQBpl4888856)     Cholesterol 06/20/2022 209 (A) <200 mg/dL Final     Triglycerides 06/20/2022 96  <150 mg/dL Final     Direct Measure HDL 06/20/2022 80  >=40 mg/dL Final     LDL Cholesterol Calculated 06/20/2022 110 (A) <=100 mg/dL Final     Non HDL Cholesterol 06/20/2022 129  <130 mg/dL Final     Patient Fasting > 8hrs?  06/20/2022 Yes   Final                   .  ..

## 2022-06-30 DIAGNOSIS — I10 PRIMARY HYPERTENSION: Primary | ICD-10-CM

## 2022-07-01 RX ORDER — HYDROCHLOROTHIAZIDE 12.5 MG/1
CAPSULE ORAL
Qty: 90 CAPSULE | Refills: 1 | Status: SHIPPED | OUTPATIENT
Start: 2022-07-01 | End: 2022-07-22

## 2022-07-01 NOTE — TELEPHONE ENCOUNTER
Routing refill request to provider for review/approval because:  Labs out of range:    BP Readings from Last 3 Encounters:   06/29/22 (!) 160/88   12/16/21 (!) 156/90   12/09/21 (!) 153/91         CAROLA Eubanks  [facility]

## 2022-07-22 ENCOUNTER — OFFICE VISIT (OUTPATIENT)
Dept: FAMILY MEDICINE | Facility: CLINIC | Age: 77
End: 2022-07-22
Payer: MEDICARE

## 2022-07-22 VITALS
OXYGEN SATURATION: 97 % | SYSTOLIC BLOOD PRESSURE: 137 MMHG | DIASTOLIC BLOOD PRESSURE: 87 MMHG | HEART RATE: 67 BPM | RESPIRATION RATE: 18 BRPM

## 2022-07-22 DIAGNOSIS — T63.441A ALLERGIC REACTION TO BEE STING: Primary | ICD-10-CM

## 2022-07-22 PROCEDURE — 99213 OFFICE O/P EST LOW 20 MIN: CPT | Performed by: PHYSICIAN ASSISTANT

## 2022-07-22 RX ORDER — EPINEPHRINE 0.3 MG/.3ML
0.3 INJECTION SUBCUTANEOUS PRN
Qty: 2 EACH | Refills: 1 | Status: SHIPPED | OUTPATIENT
Start: 2022-07-22 | End: 2023-06-28

## 2022-07-22 RX ORDER — PREDNISONE 20 MG/1
20 TABLET ORAL DAILY
Qty: 7 TABLET | Refills: 0 | Status: SHIPPED | OUTPATIENT
Start: 2022-07-22 | End: 2023-01-02

## 2022-07-22 ASSESSMENT — ENCOUNTER SYMPTOMS
RESPIRATORY NEGATIVE: 1
CARDIOVASCULAR NEGATIVE: 1

## 2022-07-31 ENCOUNTER — HEALTH MAINTENANCE LETTER (OUTPATIENT)
Age: 77
End: 2022-07-31

## 2022-08-05 ENCOUNTER — MYC MEDICAL ADVICE (OUTPATIENT)
Dept: FAMILY MEDICINE | Facility: CLINIC | Age: 77
End: 2022-08-05

## 2022-10-03 DIAGNOSIS — I10 PRIMARY HYPERTENSION: ICD-10-CM

## 2022-10-04 RX ORDER — HYDROCHLOROTHIAZIDE 12.5 MG/1
CAPSULE ORAL
Qty: 90 CAPSULE | Refills: 1 | Status: SHIPPED | OUTPATIENT
Start: 2022-10-04 | End: 2023-01-02

## 2022-10-04 NOTE — TELEPHONE ENCOUNTER
Prescription approved per [facility] Refill Protocol.    Last Written Prescription Date: 12/16/21   Last office visit: 7/22/2022 with prescribing provider

## 2022-10-15 ENCOUNTER — HEALTH MAINTENANCE LETTER (OUTPATIENT)
Age: 77
End: 2022-10-15

## 2022-10-18 ENCOUNTER — TELEPHONE (OUTPATIENT)
Dept: FAMILY MEDICINE | Facility: CLINIC | Age: 77
End: 2022-10-18

## 2022-10-18 NOTE — TELEPHONE ENCOUNTER
"Patient is scheduled for \"Hepatitis booster\" per my chart notification tomorrow 10/19.    Please advise if hep B series is necessary.   "

## 2022-10-19 ENCOUNTER — ALLIED HEALTH/NURSE VISIT (OUTPATIENT)
Dept: FAMILY MEDICINE | Facility: CLINIC | Age: 77
End: 2022-10-19
Payer: MEDICARE

## 2022-10-19 DIAGNOSIS — Z23 NEED FOR VACCINATION: Primary | ICD-10-CM

## 2022-10-19 PROCEDURE — 99207 PR NO CHARGE NURSE ONLY: CPT

## 2022-10-19 PROCEDURE — 0134A COVID-19,PF,MODERNA BIVALENT: CPT

## 2022-10-19 PROCEDURE — 91313 COVID-19,PF,MODERNA BIVALENT: CPT

## 2022-10-19 NOTE — TELEPHONE ENCOUNTER
Patient is going to wait on Hep B and discuss the next time he sees Dr. Kelley. Pt just given covid vaccine today

## 2023-01-02 ENCOUNTER — OFFICE VISIT (OUTPATIENT)
Dept: FAMILY MEDICINE | Facility: CLINIC | Age: 78
End: 2023-01-02
Payer: MEDICARE

## 2023-01-02 VITALS
RESPIRATION RATE: 16 BRPM | OXYGEN SATURATION: 99 % | BODY MASS INDEX: 23.63 KG/M2 | HEART RATE: 66 BPM | WEIGHT: 160 LBS | SYSTOLIC BLOOD PRESSURE: 110 MMHG | DIASTOLIC BLOOD PRESSURE: 80 MMHG

## 2023-01-02 DIAGNOSIS — Z11.59 NEED FOR HEPATITIS C SCREENING TEST: Primary | ICD-10-CM

## 2023-01-02 DIAGNOSIS — Z12.5 SCREENING FOR PROSTATE CANCER: ICD-10-CM

## 2023-01-02 DIAGNOSIS — N40.0 BENIGN PROSTATIC HYPERPLASIA WITHOUT URINARY OBSTRUCTION: ICD-10-CM

## 2023-01-02 DIAGNOSIS — I10 BENIGN ESSENTIAL HYPERTENSION: ICD-10-CM

## 2023-01-02 DIAGNOSIS — Z13.1 SCREENING FOR DIABETES MELLITUS: ICD-10-CM

## 2023-01-02 DIAGNOSIS — Z13.220 ENCOUNTER FOR LIPID SCREENING FOR CARDIOVASCULAR DISEASE: ICD-10-CM

## 2023-01-02 DIAGNOSIS — I10 PRIMARY HYPERTENSION: ICD-10-CM

## 2023-01-02 DIAGNOSIS — Z13.6 ENCOUNTER FOR LIPID SCREENING FOR CARDIOVASCULAR DISEASE: ICD-10-CM

## 2023-01-02 DIAGNOSIS — Z13.29 THYROID DISORDER SCREENING: ICD-10-CM

## 2023-01-02 DIAGNOSIS — I10 ESSENTIAL HYPERTENSION: ICD-10-CM

## 2023-01-02 PROCEDURE — 99214 OFFICE O/P EST MOD 30 MIN: CPT | Performed by: FAMILY MEDICINE

## 2023-01-02 RX ORDER — AMLODIPINE BESYLATE 5 MG/1
5 TABLET ORAL DAILY
Qty: 90 TABLET | Refills: 1 | Status: SHIPPED | OUTPATIENT
Start: 2023-01-02 | End: 2023-06-28

## 2023-01-02 RX ORDER — HYDROCHLOROTHIAZIDE 12.5 MG/1
1 CAPSULE ORAL DAILY
Qty: 90 CAPSULE | Refills: 1 | Status: SHIPPED | OUTPATIENT
Start: 2023-01-02 | End: 2023-06-28

## 2023-01-02 RX ORDER — TAMSULOSIN HYDROCHLORIDE 0.4 MG/1
0.4 CAPSULE ORAL DAILY
Qty: 90 CAPSULE | Refills: 1 | Status: SHIPPED | OUTPATIENT
Start: 2023-01-02 | End: 2023-06-28

## 2023-01-02 RX ORDER — HYDROCHLOROTHIAZIDE 12.5 MG/1
25 TABLET ORAL DAILY
Qty: 180 TABLET | Refills: 1 | Status: SHIPPED | OUTPATIENT
Start: 2023-01-02 | End: 2023-03-30

## 2023-01-02 RX ORDER — LISINOPRIL 40 MG/1
40 TABLET ORAL DAILY
Qty: 90 TABLET | Refills: 1 | Status: SHIPPED | OUTPATIENT
Start: 2023-01-02 | End: 2023-06-28

## 2023-01-02 NOTE — PROGRESS NOTES
Assessment & Plan     Need for hepatitis C screening test  - Hepatitis C Screen Reflex to HCV RNA Quant and Genotype; Future    Benign essential hypertension  Controlled by prescription medication prescribed by me and up to date.  - amLODIPine (NORVASC) 5 MG tablet; Take 1 tablet (5 mg) by mouth daily  - lisinopril (ZESTRIL) 40 MG tablet; Take 1 tablet (40 mg) by mouth daily    Benign prostatic hyperplasia without urinary obstruction  Controlled by prescription medication prescribed by me and up to date.  - tamsulosin (FLOMAX) 0.4 MG capsule; Take 1 capsule (0.4 mg) by mouth daily    Return in about 6 months (around 7/2/2023).    Kameron Claros MD  [facility]    Lonnie Merlos is a 77 year old, presenting for the following health issues:  Follow Up (Pt here for 6 month follow up hypertension. )      History of Present Illness       Reason for visit:  Welness check    He eats 2-3 servings of fruits and vegetables daily.He consumes 0 sweetened beverage(s) daily.He exercises with enough effort to increase his heart rate 20 to 29 minutes per day.  He exercises with enough effort to increase his heart rate 4 days per week.   He is taking medications regularly.       Hypertension Follow-up      Do you check your blood pressure regularly outside of the clinic? Yes     Are you following a low salt diet? No    Are your blood pressures ever more than 140 on the top number (systolic) OR more   than 90 on the bottom number (diastolic), for example 140/90? No    BPH  Nocturia x 1    Review of Systems   Constitutional, HEENT, cardiovascular, pulmonary, gi and gu systems are negative, except as otherwise noted.      Objective    /80 (BP Location: Right arm, Patient Position: Sitting)   Pulse 66   Resp 16   Wt 72.6 kg (160 lb)   SpO2 99%   BMI 23.63 kg/m    Body mass index is 23.63 kg/m .  Physical Exam   GENERAL: healthy, alert and no distress  NECK: no adenopathy, no asymmetry,  masses, or scars and thyroid normal to palpation  RESP: lungs clear to auscultation - no rales, rhonchi or wheezes  CV: regular rate and rhythm, normal S1 S2, no S3 or S4, no murmur, click or rub, no peripheral edema and peripheral pulses strong  ABDOMEN: soft, nontender, no hepatosplenomegaly, no masses and bowel sounds normal  MS: no gross musculoskeletal defects noted, no edema

## 2023-01-03 ENCOUNTER — TELEPHONE (OUTPATIENT)
Dept: FAMILY MEDICINE | Facility: CLINIC | Age: 78
End: 2023-01-03

## 2023-01-03 NOTE — TELEPHONE ENCOUNTER
Fax from [facility] requesting clarification on which Rx of Hydrochlorothiazide they should fill. They received one for tablets and caps.    Please advise

## 2023-03-30 ENCOUNTER — OFFICE VISIT (OUTPATIENT)
Dept: FAMILY MEDICINE | Facility: CLINIC | Age: 78
End: 2023-03-30
Payer: MEDICARE

## 2023-03-30 VITALS
WEIGHT: 158 LBS | HEART RATE: 60 BPM | BODY MASS INDEX: 23.4 KG/M2 | DIASTOLIC BLOOD PRESSURE: 84 MMHG | RESPIRATION RATE: 16 BRPM | HEIGHT: 69 IN | SYSTOLIC BLOOD PRESSURE: 122 MMHG | TEMPERATURE: 97.5 F

## 2023-03-30 DIAGNOSIS — H61.23 BILATERAL IMPACTED CERUMEN: ICD-10-CM

## 2023-03-30 DIAGNOSIS — Z12.5 SCREENING FOR PROSTATE CANCER: ICD-10-CM

## 2023-03-30 DIAGNOSIS — Z00.00 ENCOUNTER FOR MEDICARE ANNUAL WELLNESS EXAM: Primary | ICD-10-CM

## 2023-03-30 DIAGNOSIS — H35.30 MACULAR DEGENERATION (SENILE) OF RETINA: ICD-10-CM

## 2023-03-30 DIAGNOSIS — N40.0 BENIGN PROSTATIC HYPERPLASIA WITHOUT URINARY OBSTRUCTION: ICD-10-CM

## 2023-03-30 DIAGNOSIS — H90.5 SENSORINEURAL HEARING LOSS (SNHL) OF LEFT EAR, UNSPECIFIED HEARING STATUS ON CONTRALATERAL SIDE: ICD-10-CM

## 2023-03-30 DIAGNOSIS — Z13.6 ENCOUNTER FOR LIPID SCREENING FOR CARDIOVASCULAR DISEASE: ICD-10-CM

## 2023-03-30 DIAGNOSIS — Z13.220 ENCOUNTER FOR LIPID SCREENING FOR CARDIOVASCULAR DISEASE: ICD-10-CM

## 2023-03-30 DIAGNOSIS — I10 PRIMARY HYPERTENSION: ICD-10-CM

## 2023-03-30 DIAGNOSIS — Z91.030 BEE STING ALLERGY: ICD-10-CM

## 2023-03-30 DIAGNOSIS — E78.00 PURE HYPERCHOLESTEROLEMIA: ICD-10-CM

## 2023-03-30 DIAGNOSIS — Z13.1 SCREENING FOR DIABETES MELLITUS: ICD-10-CM

## 2023-03-30 DIAGNOSIS — R93.89 ABNORMAL COMPUTED TOMOGRAPHY SCAN: ICD-10-CM

## 2023-03-30 DIAGNOSIS — C44.310 BCC (BASAL CELL CARCINOMA), FACE: ICD-10-CM

## 2023-03-30 DIAGNOSIS — H40.003 GLAUCOMA SUSPECT, BILATERAL: ICD-10-CM

## 2023-03-30 DIAGNOSIS — Z11.59 NEED FOR HEPATITIS C SCREENING TEST: ICD-10-CM

## 2023-03-30 DIAGNOSIS — I10 ESSENTIAL HYPERTENSION: ICD-10-CM

## 2023-03-30 DIAGNOSIS — Z13.29 THYROID DISORDER SCREENING: ICD-10-CM

## 2023-03-30 LAB
ANION GAP SERPL CALCULATED.3IONS-SCNC: 10 MMOL/L (ref 7–15)
BUN SERPL-MCNC: 16.7 MG/DL (ref 8–23)
CALCIUM SERPL-MCNC: 9.7 MG/DL (ref 8.8–10.2)
CHLORIDE SERPL-SCNC: 103 MMOL/L (ref 98–107)
CHOLEST SERPL-MCNC: 238 MG/DL
CREAT SERPL-MCNC: 0.93 MG/DL (ref 0.67–1.17)
DEPRECATED HCO3 PLAS-SCNC: 28 MMOL/L (ref 22–29)
ERYTHROCYTE [DISTWIDTH] IN BLOOD BY AUTOMATED COUNT: 13.1 % (ref 10–15)
GFR SERPL CREATININE-BSD FRML MDRD: 85 ML/MIN/1.73M2
GLUCOSE SERPL-MCNC: 96 MG/DL (ref 70–99)
HCT VFR BLD AUTO: 45.6 % (ref 40–53)
HCV AB SERPL QL IA: NONREACTIVE
HDLC SERPL-MCNC: 89 MG/DL
HGB BLD-MCNC: 15.4 G/DL (ref 13.3–17.7)
LDLC SERPL CALC-MCNC: 135 MG/DL
MCH RBC QN AUTO: 31 PG (ref 26.5–33)
MCHC RBC AUTO-ENTMCNC: 33.8 G/DL (ref 31.5–36.5)
MCV RBC AUTO: 92 FL (ref 78–100)
NONHDLC SERPL-MCNC: 149 MG/DL
PLATELET # BLD AUTO: 260 10E3/UL (ref 150–450)
POTASSIUM SERPL-SCNC: 4.2 MMOL/L (ref 3.4–5.3)
PSA SERPL DL<=0.01 NG/ML-MCNC: 1.36 NG/ML (ref 0–6.5)
RBC # BLD AUTO: 4.97 10E6/UL (ref 4.4–5.9)
SODIUM SERPL-SCNC: 141 MMOL/L (ref 136–145)
TRIGL SERPL-MCNC: 71 MG/DL
TSH SERPL DL<=0.005 MIU/L-ACNC: 4.21 UIU/ML (ref 0.3–4.2)
WBC # BLD AUTO: 5.1 10E3/UL (ref 4–11)

## 2023-03-30 PROCEDURE — 36415 COLL VENOUS BLD VENIPUNCTURE: CPT | Performed by: FAMILY MEDICINE

## 2023-03-30 PROCEDURE — 86803 HEPATITIS C AB TEST: CPT | Performed by: FAMILY MEDICINE

## 2023-03-30 PROCEDURE — 84244 ASSAY OF RENIN: CPT | Mod: 90 | Performed by: FAMILY MEDICINE

## 2023-03-30 PROCEDURE — 84443 ASSAY THYROID STIM HORMONE: CPT | Performed by: FAMILY MEDICINE

## 2023-03-30 PROCEDURE — 85027 COMPLETE CBC AUTOMATED: CPT | Performed by: FAMILY MEDICINE

## 2023-03-30 PROCEDURE — 69210 REMOVE IMPACTED EAR WAX UNI: CPT | Performed by: FAMILY MEDICINE

## 2023-03-30 PROCEDURE — 99000 SPECIMEN HANDLING OFFICE-LAB: CPT | Performed by: FAMILY MEDICINE

## 2023-03-30 PROCEDURE — G0103 PSA SCREENING: HCPCS | Performed by: FAMILY MEDICINE

## 2023-03-30 PROCEDURE — 99214 OFFICE O/P EST MOD 30 MIN: CPT | Mod: 25 | Performed by: FAMILY MEDICINE

## 2023-03-30 PROCEDURE — 80061 LIPID PANEL: CPT | Performed by: FAMILY MEDICINE

## 2023-03-30 PROCEDURE — G0439 PPPS, SUBSEQ VISIT: HCPCS | Performed by: FAMILY MEDICINE

## 2023-03-30 PROCEDURE — 80048 BASIC METABOLIC PNL TOTAL CA: CPT | Performed by: FAMILY MEDICINE

## 2023-03-30 PROCEDURE — 82088 ASSAY OF ALDOSTERONE: CPT | Performed by: FAMILY MEDICINE

## 2023-03-30 RX ORDER — PRAVASTATIN SODIUM 20 MG
20 TABLET ORAL DAILY
Qty: 90 TABLET | Refills: 3 | Status: SHIPPED | OUTPATIENT
Start: 2023-03-30 | End: 2024-01-10

## 2023-03-30 RX ORDER — TIMOLOL MALEATE 5 MG/ML
1 SOLUTION/ DROPS OPHTHALMIC DAILY
COMMUNITY
Start: 2023-03-20

## 2023-03-30 ASSESSMENT — ENCOUNTER SYMPTOMS
PARESTHESIAS: 0
CONSTIPATION: 0
HEMATURIA: 0
SORE THROAT: 0
DIZZINESS: 0
DIARRHEA: 0
ABDOMINAL PAIN: 0
WEAKNESS: 0
JOINT SWELLING: 0
ARTHRALGIAS: 0
NAUSEA: 0
FEVER: 0
HEARTBURN: 0
FREQUENCY: 1
PALPITATIONS: 0
EYE PAIN: 0
DYSURIA: 0
COUGH: 0
HEADACHES: 0
NERVOUS/ANXIOUS: 0
CHILLS: 0
SHORTNESS OF BREATH: 0
MYALGIAS: 0
HEMATOCHEZIA: 0

## 2023-03-30 ASSESSMENT — ACTIVITIES OF DAILY LIVING (ADL): CURRENT_FUNCTION: NO ASSISTANCE NEEDED

## 2023-03-30 NOTE — PATIENT INSTRUCTIONS
Patient Education   Personalized Prevention Plan  You are due for the preventive services outlined below.  Your care team is available to assist you in scheduling these services.  If you have already completed any of these items, please share that information with your care team to update in your medical record.  Health Maintenance Due   Topic Date Due     Annual Wellness Visit  06/15/2022     Flu Vaccine (1) 09/01/2022

## 2023-03-30 NOTE — PROGRESS NOTES
"SUBJECTIVE:   Gaurang is a 77 year old who presents for Preventive Visit.  Additional Questions 3/30/2023   Roomed by Ricardo   Patient has been advised of split billing requirements and indicates understanding: Yes  Are you in the first 12 months of your Medicare coverage?  No    Healthy Habits:     In general, how would you rate your overall health?  Good    Frequency of exercise:  4-5 days/week    Duration of exercise:  45-60 minutes    Do you usually eat at least 4 servings of fruit and vegetables a day, include whole grains    & fiber and avoid regularly eating high fat or \"junk\" foods?  Yes    Taking medications regularly:  Yes    Medication side effects:  None    Ability to successfully perform activities of daily living:  No assistance needed    Home Safety:  No safety concerns identified    Hearing Impairment:  Difficulty following a conversation in a noisy restaurant or crowded room    In the past 6 months, have you been bothered by leaking of urine?  No    In general, how would you rate your overall mental or emotional health?  Good      PHQ-2 Total Score: 0    Additional concerns today:  Yes      Have you ever done Advance Care Planning? (For example, a Health Directive, POLST, or a discussion with a medical provider or your loved ones about your wishes): Yes, advance care planning is on file.     Pt declines hearing screen.    Fall risk  Fallen 2 or more times in the past year?: No  Any fall with injury in the past year?: No    Cognitive Screening normal        Reviewed and updated as needed this visit by clinical staff   Tobacco  Allergies  Meds              Reviewed and updated as needed this visit by Provider                 Social History     Tobacco Use     Smoking status: Never     Smokeless tobacco: Never   Substance Use Topics     Alcohol use: Yes         Alcohol Use 3/30/2023   Prescreen: >3 drinks/day or >7 drinks/week? No     Do you have a current opioid prescription? No  Do you use any other " controlled substances or medications that are not prescribed by a provider? None        Current providers sharing in care for this patient include:   Patient Care Team:  Jason Kelley MD as PCP - General (Family Medicine)  Jason Kelley MD as Assigned PCP    The following health maintenance items are reviewed in Epic and correct as of today:  Health Maintenance   Topic Date Due     MEDICARE ANNUAL WELLNESS VISIT  06/15/2022     INFLUENZA VACCINE (1) 09/01/2022     HEPATITIS C SCREENING  03/29/2024 (Originally 5/6/1963)     ANNUAL REVIEW OF HM ORDERS  06/29/2023     FALL RISK ASSESSMENT  03/30/2024     COLORECTAL CANCER SCREENING  06/23/2024     DTAP/TDAP/TD IMMUNIZATION (5 - Td or Tdap) 08/11/2026     LIPID  06/20/2027     ADVANCE CARE PLANNING  06/24/2027     PHQ-2 (once per calendar year)  Completed     Pneumococcal Vaccine: 65+ Years  Completed     ZOSTER IMMUNIZATION  Completed     COVID-19 Vaccine  Completed     IPV IMMUNIZATION  Aged Out     MENINGITIS IMMUNIZATION  Aged Out       Patient Active Problem List   Diagnosis     BCC (basal cell carcinoma), face     Inverness's disease     Seasonal allergic rhinitis     Palpitations     Primary hypertension     Hyperlipidemia     History of parasitic disease     Benign prostatic hyperplasia without urinary obstruction     Abnormal computed tomography scan     Hearing loss in left ear     Glaucoma suspect, bilateral     Bee sting allergy     Current Outpatient Medications   Medication     amLODIPine (NORVASC) 5 MG tablet     EPINEPHrine (ANY BX GENERIC EQUIV) 0.3 MG/0.3ML injection 2-pack     hydrochlorothiazide (HYDRODIURIL) 12.5 MG tablet     hydrochlorothiazide (MICROZIDE) 12.5 MG capsule     lisinopril (ZESTRIL) 40 MG tablet     Multiple Vitamins-Minerals (DAILY MULTI PO)     Multiple Vitamins-Minerals (ICAPS AREDS 2 PO)     NIACIN PO     Omega-3 Fatty Acids (FISH OIL PO)     pravastatin (PRAVACHOL) 20 MG tablet     tamsulosin (FLOMAX) 0.4 MG capsule      "timolol maleate (TIMOPTIC) 0.5 % ophthalmic solution     timolol maleate (TIMOPTIC-XE) 0.25 % ophthalmic gel-form     triamcinolone (KENALOG) 0.1 % external cream     No current facility-administered medications for this visit.             Review of Systems   Constitutional: Negative for chills and fever.   HENT: Positive for congestion and hearing loss. Negative for ear pain and sore throat.    Eyes: Positive for visual disturbance. Negative for pain.   Respiratory: Negative for cough and shortness of breath.    Cardiovascular: Negative for chest pain, palpitations and peripheral edema.   Gastrointestinal: Negative for abdominal pain, constipation, diarrhea, heartburn, hematochezia and nausea.   Genitourinary: Positive for frequency and urgency. Negative for dysuria, genital sores, hematuria, impotence and penile discharge.   Musculoskeletal: Negative for arthralgias, joint swelling and myalgias.   Skin: Negative for rash.   Neurological: Negative for dizziness, weakness, headaches and paresthesias.   Psychiatric/Behavioral: Negative for mood changes. The patient is not nervous/anxious.      Constitutional, HEENT, cardiovascular, pulmonary, gi and gu systems are negative, except as otherwise noted.    OBJECTIVE:   /84 (BP Location: Right arm, Patient Position: Sitting, Cuff Size: Adult Regular)   Pulse 60   Temp 97.5  F (36.4  C) (Temporal)   Resp 16   Ht 1.753 m (5' 9\")   Wt 71.7 kg (158 lb)   BMI 23.33 kg/m   Estimated body mass index is 23.33 kg/m  as calculated from the following:    Height as of this encounter: 1.753 m (5' 9\").    Weight as of this encounter: 71.7 kg (158 lb).  Physical Exam  GENERAL: healthy, alert and no distress  Both ears are are occluded with cerumen they are curetted and irrigated to clear  NECK: no adenopathy, no asymmetry, masses, or scars and thyroid normal to palpation  RESP: lungs clear to auscultation - no rales, rhonchi or wheezes  CV: regular rate and rhythm, normal " S1 S2, no S3 or S4, no murmur, click or rub, no peripheral edema and peripheral pulses strong  ABDOMEN: soft, nontender, no hepatosplenomegaly, no masses and bowel sounds normal  MS: no gross musculoskeletal defects noted, no edema        ASSESSMENT / PLAN:   .(Z00.00) Encounter for Medicare annual wellness exam  (primary encounter diagnosis)  Comment: Healthcare maintenance reviewed  Plan: PRIMARY CARE FOLLOW-UP SCHEDULING            (I10) Primary hypertension  Comment: Under good control we will we will get additional studies for underlying causes  Plan: PRIMARY CARE FOLLOW-UP SCHEDULING, Aldosterone,        Renin activity, Aldosterone Renin Ratio,         Potassium            (E78.00) Pure hypercholesterolemia  Comment: Intolerant of other statins we will trial Pravachol  Plan: PRIMARY CARE FOLLOW-UP SCHEDULING, pravastatin         (PRAVACHOL) 20 MG tablet            (R93.89) Abnormal computed tomography scan  Comment: Reviewed in chart  Plan: PRIMARY CARE FOLLOW-UP SCHEDULING            (N40.0) Benign prostatic hyperplasia without urinary obstruction  Comment: Symptoms tolerable  Plan: PRIMARY CARE FOLLOW-UP SCHEDULING            (H40.003) Glaucoma suspect, bilateral  Comment: Followed by [facility]  Plan: PRIMARY CARE FOLLOW-UP SCHEDULING            (H35.30) Macular degeneration (senile) of retina  Comment: Followed by [facility]  Plan:     (C44.310) BCC (basal cell carcinoma), face  Comment: Followed by [facility] dermatology also takes care of actinic keratosis  Plan:     (H90.5) Sensorineural hearing loss (SNHL) of left ear, unspecified hearing status on contralateral side  Comment: Left ear has a hearing aid  Plan: PRIMARY CARE FOLLOW-UP SCHEDULING            (H61.23) Bilateral impacted cerumen  Comment: Treated  Plan:     (Z91.030) Bee sting allergy  Comment: Discussed epinephrine  Plan:               COUNSELING:  Reviewed preventive health counseling, as reflected in patient instructions        He  reports that he has never smoked. He has never used smokeless tobacco.      Appropriate preventive services were discussed with this patient, including applicable screening as appropriate for cardiovascular disease, diabetes, osteopenia/osteoporosis, and glaucoma.  As appropriate for age/gender, discussed screening for colorectal cancer, prostate cancer, breast cancer, and cervical cancer. Checklist reviewing preventive services available has been given to the patient.    Reviewed patients plan of care and provided an AVS. The Basic Care Plan (routine screening as documented in Health Maintenance) for Gaurang meets the Care Plan requirement. This Care Plan has been established and reviewed with the Patient.      Jason Kelley MD  [facility]    Identified Health Risks:

## 2023-03-30 NOTE — LETTER
April 3, 2023      Gaurang Saeed  [address] [place] [id number]        Dear ,    We are writing to inform you of your test results.    Your test results fall within the expected range(s) or remain unchanged from previous results.  Please continue with current treatment plan.    Resulted Orders   Basic metabolic panel   Result Value Ref Range    Sodium 141 136 - 145 mmol/L    Potassium 4.2 3.4 - 5.3 mmol/L    Chloride 103 98 - 107 mmol/L    Carbon Dioxide (CO2) 28 22 - 29 mmol/L    Anion Gap 10 7 - 15 mmol/L    Urea Nitrogen 16.7 8.0 - 23.0 mg/dL    Creatinine 0.93 0.67 - 1.17 mg/dL    Calcium 9.7 8.8 - 10.2 mg/dL    Glucose 96 70 - 99 mg/dL    GFR Estimate 85 >60 mL/min/1.73m2      Comment:      eGFR calculated using  CKD-EPI equation.   Aldosterone   Result Value Ref Range    Aldosterone 8.7 0.0 - 31.0 ng/dL   Renin activity   Result Value Ref Range    Renin Activity 1.5 ng/mL/hr      Comment:      INTERPRETIVE INFORMATION: Renin Activity    Adult, Normal sodium diet:    Supine ................. 0.2-1.6 ng/mL/hr    Upright ................ 0.5-4.0 ng/mL/hr    Children, Normal sodium diet, Supine:    Cash (1-7 days) ..... 2.0-35.0 ng/mL/hr    Cord blood ............. 4.0-32.0 ng/mL/hr    1-12 mos ............... 2.4-37.0 ng/mL/hr    13 mos-3 yrs ........... 1.7-11.2 ng/mL/hr    4-5 yrs ................ 1.0- 6.5 ng/mL/hr    6-10 yrs ............... 0.5- 5.9 ng/mL/hr    11-15 yrs .............. 0.5- 3.3 ng/mL/hr    Children, normal sodium diet, Upright:    0-3 yrs ................ Not Available    4-5 yrs ................ Less than or equal to 15 ng/mL/hr    6-10 yrs ............... Less than or equal to 17 ng/mL/hr    11-15 yrs .............. Less than or equal to 16 ng/mL/hr    Plasma renin activity measures enzyme ability to convert   angiotensinogen to angiotensin I and is limited by the   availability of angiotensinogen. Plasma renin activity is   not an accurate indicator  of enzyme  activity when   angiotensinogen is decreased.    This test was developed and its performance characteristics   determined by [facility]. It has not been cleared or   approved by the US Food and Drug Administration. This test   was performed in a CLIA certified laboratory and is   intended for clinical purposes.  Performed By: [facility]  [address] [place] [address]  : Emiliano Wade MD, PhD   Aldosterone Renin Ratio   Result Value Ref Range    Aldosterone Renin Ratio 5.8 0.0 - 25.0       If you have any questions or concerns, please call the clinic at the number listed above.       Sincerely,      Jason Kelley MD on 4/3/2023 at 5:23 PM

## 2023-03-31 LAB — ALDOST SERPL-MCNC: 8.7 NG/DL (ref 0–31)

## 2023-04-01 LAB — RENIN PLAS-CCNC: 1.5 NG/ML/HR

## 2023-04-03 LAB — ALDOST/RENIN PLAS-RTO: 5.8 {RATIO} (ref 0–25)

## 2023-06-05 ENCOUNTER — OFFICE VISIT (OUTPATIENT)
Dept: FAMILY MEDICINE | Facility: CLINIC | Age: 78
End: 2023-06-05
Payer: MEDICARE

## 2023-06-05 VITALS
DIASTOLIC BLOOD PRESSURE: 64 MMHG | HEIGHT: 69 IN | RESPIRATION RATE: 16 BRPM | OXYGEN SATURATION: 96 % | BODY MASS INDEX: 22.72 KG/M2 | HEART RATE: 68 BPM | TEMPERATURE: 97.8 F | SYSTOLIC BLOOD PRESSURE: 104 MMHG | WEIGHT: 153.4 LBS

## 2023-06-05 DIAGNOSIS — M72.0 DUPUYTREN'S CONTRACTURE: ICD-10-CM

## 2023-06-05 DIAGNOSIS — E78.5 HYPERLIPIDEMIA LDL GOAL <100: ICD-10-CM

## 2023-06-05 DIAGNOSIS — R20.2 PARESTHESIA: ICD-10-CM

## 2023-06-05 DIAGNOSIS — R79.89 ELEVATED TSH: ICD-10-CM

## 2023-06-05 DIAGNOSIS — Z13.6 SCREENING FOR HEART DISEASE: ICD-10-CM

## 2023-06-05 DIAGNOSIS — R23.2 VASOMOTOR FLUSHING: Primary | ICD-10-CM

## 2023-06-05 DIAGNOSIS — L71.9 ROSACEA: ICD-10-CM

## 2023-06-05 LAB — TSH SERPL DL<=0.005 MIU/L-ACNC: 2.1 UIU/ML (ref 0.3–4.2)

## 2023-06-05 PROCEDURE — 99215 OFFICE O/P EST HI 40 MIN: CPT | Performed by: FAMILY MEDICINE

## 2023-06-05 PROCEDURE — 84443 ASSAY THYROID STIM HORMONE: CPT | Performed by: FAMILY MEDICINE

## 2023-06-05 PROCEDURE — 36415 COLL VENOUS BLD VENIPUNCTURE: CPT | Performed by: FAMILY MEDICINE

## 2023-06-05 NOTE — PROGRESS NOTES
Answers for HPI/ROS submitted by the patient on 6/5/2023  How many servings of fruits and vegetables do you eat daily?: 2-3  On average, how many sweetened beverages do you drink each day (Examples: soda, juice, sweet tea, etc.  Do NOT count diet or artificially sweetened beverages)?: 0  How many minutes a day do you exercise enough to make your heart beat faster?: 20 to 29  How many days a week do you exercise enough to make your heart beat faster?: 4  How many days per week do you miss taking your medication?: 0  What is the reason for your visit today?: flushing  When did your symptoms begin?: 3-7 days ago

## 2023-06-05 NOTE — PROGRESS NOTES
Assessment & Plan   Problem List Items Addressed This Visit    None  Visit Diagnoses     Vasomotor flushing    -  Primary    Dupuytren's contracture        Relevant Orders    Orthopedic  Referral    Elevated TSH        Paresthesia        Relevant Orders    TSH with free T4 reflex    Screening for heart disease        Relevant Orders    CT Coronary Calcium Scan    Rosacea        Hyperlipidemia LDL goal <100             Patient shares with me that he started a statin around the beginning of April.  He was previously already taking a slow release niacin.  He has rosacea.  Usually his rosacea does not cause some symptoms.  He has had a few episodes now that last about an hour where his face will get be red he will have some nausea and some numbness.        On exam he has a few telangiectasias on his cheeks.  General: alert and oriented ×3 no acute distress.    HEENT: pupils are equal round and reactive to light extraocular motion is intact. Normocephalic and atraumatic.     Hearing is grossly normal and there is no otorrhea.     Chest: has bilateral rise with no increased work of breathing.    Cardiovascular: normal perfusion and brisk capillary refill.    Musculoskeletal: Left fifth digit has a thickened band on the palmar surface.  Please see image captured today for additional information.  There is limited range of motion with the finger.  Normal gait.    Neuro: no gross focal abnormalities and memory seems intact.    Psychiatric: speech is clear and coherent and fluent. Patient dressed appropriately for the weather. Mood is appropriate and affect is full.      Chart review shows that in March when his lipid panel was checked he also had a TSH checked that was elevated.  He reports that he did previously have a CT calcium score but that was probably about 20 years ago.    In regards to flushing unsure if adding his statin that has influenced the metabolism of his niacin which is now causing symptomatic  flushing.  He did stop taking the niacin 2 days ago.  I do not think that he has been off of the niacin long enough to be able to determine if this was the cause of the blushing.  However, it has been shown that there is no cardiac benefit to treating elevated triglycerides with niacin so I think it is okay to discontinue the niacin.    Hyperlipidemia.  Suggested that we consider first checking to see if he has hypothyroidism and if so is it subclinical or clinical hypothyroidism.  Having untreated hypothyroid disease this can affect lipid results.  We also discussed that he could do a CT calcium score again which would help us clarify whether or not he is at a high enough risk for cardiovascular events to warrant lipid therapy.  He would like to pursue this.  In the meantime I will have him hold his statin and continue to monitor flushing symptoms and then he is welcome to follow-up with me or his primary care provider to review the results of the CT calcium score.    Hypertension well-controlled.     Dupuytran's contracture.  Now symptomatic.  Referral placed to [facility] hand surgery per patient request.    Total time spent reviewing chart and preparing for appointment, with patient for appointment, and time spent charting and coordinating care on the day of the appointment in minutes was: 54                 Khushboo Mason MD  [facility]    Lonnie Merlos is a 78 year old, presenting for the following health issues:  Other (Pt c/o beet red face, nausea and numbness in mouth lasting one hour. Also felt his equilibrium was off. Another time while driving he felt he needed to sneeze or vomit. He sneezed twice and then felt flush, beet red face and numbness, episodes happened on Friday and Sunday this past weekend. ) and Finger (Pt requesting referral to [facility] for his L hand pinky. He is not able to straighten it progressively worse in last couple of months with pain in the last  "couple of weeks. )        3/30/2023     8:06 AM   Additional Questions   Roomed by Ricardo     History of Present Illness       Reason for visit:  Flushing  Symptom onset:  3-7 days ago    He eats 2-3 servings of fruits and vegetables daily.He consumes 0 sweetened beverage(s) daily.He exercises with enough effort to increase his heart rate 20 to 29 minutes per day.  He exercises with enough effort to increase his heart rate 4 days per week.   He is taking medications regularly.               Review of Systems         Objective    /64 (BP Location: Right arm, Patient Position: Sitting)   Pulse 68   Temp 97.8  F (36.6  C) (Tympanic)   Resp 16   Ht 1.753 m (5' 9\")   Wt 69.6 kg (153 lb 6.4 oz)   SpO2 96%   BMI 22.65 kg/m    Body mass index is 22.65 kg/m .  Physical Exam                       "

## 2023-06-05 NOTE — PATIENT INSTRUCTIONS
We have referred you for imaging studies that will be done at an outside facility. The request will be routed to the outside facility in the next 24 hours. Please call the facility directly on the next business day to schedule your appointment.  [facility] - [phone]      If you are having difficulty getting the test scheduled or have other questions or concerns, or if you had your imaging done and you have not heard from us within 2 business days regarding the results, please contact the clinic at [phone].

## 2023-06-13 ENCOUNTER — MYC MEDICAL ADVICE (OUTPATIENT)
Dept: FAMILY MEDICINE | Facility: CLINIC | Age: 78
End: 2023-06-13
Payer: MEDICARE

## 2023-06-13 DIAGNOSIS — M72.0 DUPUYTREN'S CONTRACTURE: Primary | ICD-10-CM

## 2023-06-14 ENCOUNTER — ANCILLARY PROCEDURE (OUTPATIENT)
Dept: GENERAL RADIOLOGY | Facility: CLINIC | Age: 78
End: 2023-06-14
Attending: FAMILY MEDICINE
Payer: MEDICARE

## 2023-06-14 DIAGNOSIS — M72.0 DUPUYTREN'S CONTRACTURE: ICD-10-CM

## 2023-06-14 PROCEDURE — 73130 X-RAY EXAM OF HAND: CPT | Mod: TC | Performed by: RADIOLOGY

## 2023-06-14 NOTE — TELEPHONE ENCOUNTER
See MyChart from Patient needing PCP review.  Please respond directly to patient, if at all able.    CAROLA Eubanks  [facility]

## 2023-06-23 ENCOUNTER — HOSPITAL ENCOUNTER (OUTPATIENT)
Dept: CT IMAGING | Facility: CLINIC | Age: 78
Discharge: HOME | End: 2023-06-23
Attending: FAMILY MEDICINE | Admitting: FAMILY MEDICINE

## 2023-06-23 DIAGNOSIS — Z13.6 SCREENING FOR HEART DISEASE: ICD-10-CM

## 2023-06-23 LAB
CV CALCIUM SCORE AGATSTON LM: 0
CV CALCIUM SCORING AGATSON LAD: 157
CV CALCIUM SCORING AGATSTON CX: 40
CV CALCIUM SCORING AGATSTON RCA: 9
CV CALCIUM SCORING AGATSTON TOTAL: 206

## 2023-06-23 PROCEDURE — 75571 CT HRT W/O DYE W/CA TEST: CPT

## 2023-06-23 PROCEDURE — 75571 CT HRT W/O DYE W/CA TEST: CPT | Mod: 26 | Performed by: INTERNAL MEDICINE

## 2023-07-03 PROBLEM — T63.441A ALLERGIC REACTION TO BEE STING: Status: ACTIVE | Noted: 2023-07-03

## 2023-07-05 ENCOUNTER — VIRTUAL VISIT (OUTPATIENT)
Dept: FAMILY MEDICINE | Facility: CLINIC | Age: 78
End: 2023-07-05
Payer: MEDICARE

## 2023-07-05 DIAGNOSIS — I10 BENIGN ESSENTIAL HYPERTENSION: ICD-10-CM

## 2023-07-05 DIAGNOSIS — Z78.9 STATIN INTOLERANCE: ICD-10-CM

## 2023-07-05 DIAGNOSIS — R93.1 ABNORMAL HEART SCORE CT: Primary | ICD-10-CM

## 2023-07-05 DIAGNOSIS — N40.0 BENIGN PROSTATIC HYPERPLASIA WITHOUT URINARY OBSTRUCTION: ICD-10-CM

## 2023-07-05 DIAGNOSIS — T63.441A ALLERGIC REACTION TO BEE STING: ICD-10-CM

## 2023-07-05 PROCEDURE — 99213 OFFICE O/P EST LOW 20 MIN: CPT | Mod: VID | Performed by: FAMILY MEDICINE

## 2023-07-05 RX ORDER — AMLODIPINE BESYLATE 5 MG/1
5 TABLET ORAL DAILY
Qty: 90 TABLET | Refills: 2 | Status: SHIPPED | OUTPATIENT
Start: 2023-07-05 | End: 2024-03-22

## 2023-07-05 RX ORDER — LISINOPRIL 40 MG/1
40 TABLET ORAL DAILY
Qty: 90 TABLET | Refills: 2 | Status: SHIPPED | OUTPATIENT
Start: 2023-07-05 | End: 2024-01-10

## 2023-07-05 RX ORDER — HYDROCHLOROTHIAZIDE 12.5 MG/1
1 CAPSULE ORAL DAILY
Qty: 90 CAPSULE | Refills: 2 | Status: SHIPPED | OUTPATIENT
Start: 2023-07-05 | End: 2024-03-22

## 2023-07-05 RX ORDER — TAMSULOSIN HYDROCHLORIDE 0.4 MG/1
0.4 CAPSULE ORAL DAILY
Qty: 90 CAPSULE | Refills: 2 | Status: SHIPPED | OUTPATIENT
Start: 2023-07-05 | End: 2023-09-15

## 2023-07-05 RX ORDER — EPINEPHRINE 0.3 MG/.3ML
0.3 INJECTION SUBCUTANEOUS PRN
Qty: 2 EACH | Refills: 1 | Status: SHIPPED | OUTPATIENT
Start: 2023-07-05 | End: 2024-08-06

## 2023-07-05 NOTE — PROGRESS NOTES
Gaurang is a 78 year old who is being evaluated via a billable video visit.      How would you like to obtain your AVS? MyChart  If the video visit is dropped, the invitation should be resent by: Text to cell phone: [phone]  Will anyone else be joining your video visit? No        Assessment & Plan     Abnormal Heart Score CT  Patient with abnormal cardiac calcium score although not severe he is still very concerned.  We will start on a baby aspirin a day.  He is very physically fit.  His blood pressures been under excellent control.  He has been intolerant of statins including Crestor Lipitor and pravastatin.  He would like to consult cardiology regarding consideration for Repatha.  We will put in referral  - Adult Cardiology Eval  Referral; Future    Statin intolerance  As above  - Adult Cardiology Eval  Referral; Future    Benign essential hypertension  Under excellent control with meds as below  - amLODIPine (NORVASC) 5 MG tablet; Take 1 tablet (5 mg) by mouth daily  - hydrochlorothiazide (MICROZIDE) 12.5 MG capsule; Take 1 capsule (12.5 mg) by mouth daily  - lisinopril (ZESTRIL) 40 MG tablet; Take 1 tablet (40 mg) by mouth daily    Benign prostatic hyperplasia without urinary obstruction    - tamsulosin (FLOMAX) 0.4 MG capsule; Take 1 capsule (0.4 mg) by mouth daily    Allergic reaction to bee sting    - EPINEPHrine (ANY BX GENERIC EQUIV) 0.3 MG/0.3ML injection 2-pack; Inject 0.3 mLs (0.3 mg) into the muscle as needed for anaphylaxis May repeat one time in 5-15 minutes if response to initial dose is inadequate.                 Jason Kelley MD  [facility]    Subjective   Gaurang is a 78 year old, presenting for the following health issues:  Results (Pt wanting Follow-up on recent CT calcium score results.  ) and Hypertension (Pt also needing refills on his HTN medications.)        6/5/2023     1:52 PM   Additional Questions   Roomed by Francisca Westfall of  Present Illness       Reason for visit:  Follow-up on CT scan (cardiac)    He eats 2-3 servings of fruits and vegetables daily.He consumes 0 sweetened beverage(s) daily.He exercises with enough effort to increase his heart rate 20 to 29 minutes per day.  He exercises with enough effort to increase his heart rate 4 days per week.   He is taking medications regularly.       Hypertension Follow-up      Do you check your blood pressure regularly outside of the clinic? No     Are you following a low salt diet? Yes    Are your blood pressures ever more than 140 on the top number (systolic) OR more   than 90 on the bottom number (diastolic), for example 140/90? No    Patient Active Problem List   Diagnosis     BCC (basal cell carcinoma), face     Freddy's disease     Seasonal allergic rhinitis     Palpitations     Benign essential hypertension     Hyperlipidemia     History of parasitic disease     Benign prostatic hyperplasia without urinary obstruction     Abnormal computed tomography scan     Hearing loss in left ear     Glaucoma suspect, bilateral     Bee sting allergy     Allergic reaction to bee sting     Statin intolerance     Current Outpatient Medications   Medication     amLODIPine (NORVASC) 5 MG tablet     EPINEPHrine (ANY BX GENERIC EQUIV) 0.3 MG/0.3ML injection 2-pack     hydrochlorothiazide (MICROZIDE) 12.5 MG capsule     lisinopril (ZESTRIL) 40 MG tablet     Multiple Vitamins-Minerals (DAILY MULTI PO)     Multiple Vitamins-Minerals (ICAPS AREDS 2 PO)     Omega-3 Fatty Acids (FISH OIL PO)     pravastatin (PRAVACHOL) 20 MG tablet     tamsulosin (FLOMAX) 0.4 MG capsule     timolol maleate (TIMOPTIC) 0.5 % ophthalmic solution     timolol maleate (TIMOPTIC-XE) 0.25 % ophthalmic gel-form     No current facility-administered medications for this visit.           Review of Systems         Objective           Vitals:  No vitals were obtained today due to virtual visit.    Physical Exam   GENERAL: Healthy, alert and  no distress  EYES: Eyes grossly normal to inspection.  No discharge or erythema, or obvious scleral/conjunctival abnormalities.  RESP: No audible wheeze, cough, or visible cyanosis.  No visible retractions or increased work of breathing.    SKIN: Visible skin clear. No significant rash, abnormal pigmentation or lesions.  NEURO: Cranial nerves grossly intact.  Mentation and speech appropriate for age.  PSYCH: Mentation appears normal, affect normal/bright, judgement and insight intact, normal speech and appearance well-groomed.    Hospital Outpatient Visit on 06/23/2023   Component Date Value Ref Range Status     Agatston Score of Left Main 06/23/2023 0   Final     Agatston Score of the Left Anterio* 06/23/2023 157   Final     Agatston Score of Circumflex 06/23/2023 40   Final     Agatston Score of Right Coronary A* 06/23/2023 9   Final     Total Score 06/23/2023 206.00   Final               Video-Visit Details    Type of service:  Video Visit   Video Start Time: 930  Video End Time: 935    Originating Location (pt. Location): Home  Distant Location (provider location):  On-site  Platform used for Video Visit: [employer]

## 2023-09-15 ENCOUNTER — OFFICE VISIT (OUTPATIENT)
Dept: FAMILY MEDICINE | Facility: CLINIC | Age: 78
End: 2023-09-15
Payer: MEDICARE

## 2023-09-15 VITALS
RESPIRATION RATE: 10 BRPM | WEIGHT: 152.3 LBS | HEART RATE: 71 BPM | DIASTOLIC BLOOD PRESSURE: 70 MMHG | OXYGEN SATURATION: 98 % | BODY MASS INDEX: 22.49 KG/M2 | SYSTOLIC BLOOD PRESSURE: 120 MMHG

## 2023-09-15 DIAGNOSIS — L98.9 SKIN LESION: Primary | ICD-10-CM

## 2023-09-15 PROCEDURE — 17000 DESTRUCT PREMALG LESION: CPT | Performed by: FAMILY MEDICINE

## 2023-09-15 NOTE — PROGRESS NOTES
Assessment & Plan     Skin lesion  With actinic keratoses treated if it does not resolve he will see his dermatologist                 Jason Kelley MD  [facility]    Lonnie Merlos is a 78 year old, presenting for the following health issues: Patient's had a recurrent scaly lesion on his left cheek for several months now.  Has a history of squamous cell cancer.  Derm Problem (Left Side Facial - Cheek - 6 MO)      9/15/2023     8:58 AM   Additional Questions   Roomed by Aurelia YORK CMA   Accompanied by None       History of Present Illness       Reason for visit:  Skin check  Symptom onset:  More than a month  Symptoms include:  Spot wont heal  Symptom intensity:  Mild  Symptom progression:  Staying the same  Had these symptoms before:  Yes  Has tried/received treatment for these symptoms:  No  What makes it worse:  No  What makes it better:  No    He eats 2-3 servings of fruits and vegetables daily.He consumes 0 sweetened beverage(s) daily.He exercises with enough effort to increase his heart rate 30 to 60 minutes per day.  He exercises with enough effort to increase his heart rate 4 days per week.   He is taking medications regularly.               Review of Systems         Objective    There were no vitals taken for this visit.  There is no height or weight on file to calculate BMI.  Physical Exam   Patient is a 1 cm scaly area on his left cheek appears to be actinic keratosis we froze it x3 with liquid nitrogen

## 2023-10-03 ENCOUNTER — OFFICE VISIT (OUTPATIENT)
Dept: CARDIOLOGY | Facility: CLINIC | Age: 78
End: 2023-10-03
Payer: MEDICARE

## 2023-10-03 VITALS
HEART RATE: 68 BPM | SYSTOLIC BLOOD PRESSURE: 116 MMHG | RESPIRATION RATE: 14 BRPM | WEIGHT: 152 LBS | BODY MASS INDEX: 22.51 KG/M2 | DIASTOLIC BLOOD PRESSURE: 78 MMHG | HEIGHT: 69 IN

## 2023-10-03 DIAGNOSIS — I10 BENIGN ESSENTIAL HYPERTENSION: Primary | ICD-10-CM

## 2023-10-03 DIAGNOSIS — Z78.9 STATIN INTOLERANCE: ICD-10-CM

## 2023-10-03 DIAGNOSIS — R93.1 ELEVATED CORONARY ARTERY CALCIUM SCORE: ICD-10-CM

## 2023-10-03 DIAGNOSIS — R93.1 ABNORMAL HEART SCORE CT: ICD-10-CM

## 2023-10-03 PROCEDURE — 99204 OFFICE O/P NEW MOD 45 MIN: CPT | Performed by: INTERNAL MEDICINE

## 2023-10-03 RX ORDER — ASPIRIN 81 MG/1
81 TABLET ORAL DAILY
COMMUNITY

## 2023-10-03 NOTE — LETTER
"10/3/2023    Jason Kelley MD  [address] [facility] [address]    RE: Gaurang Saeed       Dear Colleague,     I had the pleasure of seeing Gaurang Saeed in the [facility].      [facility]  [phone]          Assessment/Recommendations   Patient with elevated calcium score, primarily in the left anterior descending coronary artery.  He does exercise and has not noticed any changes in his functional capacity and would like to be proactive on preventing cardiac events.  I would like him to exercise vigorously as well and because of the LAD location of most of the calcium would recommend a stress echocardiogram.  The stress echocardiogram will be excellent at looking at the anterior wall associated with the LAD and if that test is unremarkable, we will continue to push him towards regular and vigorous exercise.  His blood pressure is well controlled on current medical regimen.  He is on an antiplatelet agent.    He had an intolerance to the statin but is trying them again and would recommend a repeat lipid panel in 3 months.    We discussed diet, alcohol consumption, and carbohydrate reduction and I also recommended that he read the book \"the science of a long life\" by Dr. Rosmery Lewis.    We will get back to him with the results of the stress echocardiogram and any further recommendations.       History of Present Illness/Subjective    Mr. Gaurang Saeed is a 78 year old male with known hypertension who also had a recent calcium score which was over 200, most of which was in the left anterior descending coronary artery.  The patient is a regular exercising routine going to a fitness center 3 times a week and exercising vigorously.  He has no changes in his functional capacity.  His spouse says that his energy level has deteriorated some but not dramatically so.  He denies orthopnea, paroxysmal nocturnal dyspnea, peripheral edema, syncope, near syncope and has no " "history of rheumatic fever, cerebrovascular accident or TIA.  He denies any chest discomfort, chest tightness, jaw discomfort, or unusual shortness of breath with activity.    He is not diabetic but has been treated for hypertension.  His father  of a myocardial infarction at age 53.  Patient is a non-smoker.  LDL cholesterol is in the 1 30-1 40 range.  He just restarted a statin.    The patient grew up in [facility].  He worked as an  and eventually as a  and is now retired.  He has 2 children neither of whom have heart problems.  He is  and his spouse is with him at the time of our meeting.           Physical Examination Review of Systems   /78 (BP Location: Right arm, Patient Position: Sitting, Cuff Size: Adult Regular)   Pulse 68   Resp 14   Ht 1.753 m (5' 9\")   Wt 68.9 kg (152 lb)   BMI 22.45 kg/m    Body mass index is 22.45 kg/m .  Wt Readings from Last 3 Encounters:   10/03/23 68.9 kg (152 lb)   09/15/23 69.1 kg (152 lb 4.8 oz)   23 69.6 kg (153 lb 6.4 oz)     General Appearance:   Alert, cooperative and in no acute distress.   ENT/Mouth: Pink/moist oral mucosa   EYES:  no scleral icterus, normal conjunctivae   Neck: JVP normal. No Hepatojugular reflux. Thyroid not visualized.   Chest/Lungs:   Lungs are clear to auscultation, equal chest wall expansion.   Cardiovascular:   S1, S2 without murmur ,clicks or rubs. Brachial, radial and posterior tibial pulses are intact and symetric. No carotid bruits noted   Abdomen:  Nontender. BS+. No bruits.   Extremities: No cyanosis, clubbing or edema   Skin: no xanthelasma, warm.    Neurologic: normal arm movement bilateral, no tremors     Psychiatric: Appropriate affect.      Enc Vitals  BP: 116/78  Pulse: 68  Resp: 14  Weight: 68.9 kg (152 lb)  Height: 175.3 cm (5' 9\")                                           Medical History  Surgical History Family History Social History   Past Medical History:   Diagnosis Date    " Basal cell carcinoma     Squamous cell carcinoma     Past Surgical History:   Procedure Laterality Date    APPENDECTOMY      1960    COLOGUARD(EXACT SCIENCES)  06/23/2021    Cologurard - Negative    COLONOSCOPY  08/17/2011    Negative. Repeat in 10 years.    COLONOSCOPY  03/15/1996    CT CALCIUM SCREENING  03/25/2014    Total Agatston calcium score is 62    MOHS MICROGRAPHIC PROCEDURE      NO HISTORY OF SURGERY      RECTAL BIOPSY  03/15/1996    Family History   Problem Relation Age of Onset    Hypertension Mother     Hypothyroidism Mother     Myocardial Infarction Father     Substance Abuse Father     Cancer No family hx of         no history of skin in family    Social History     Socioeconomic History    Marital status:      Spouse name: Not on file    Number of children: Not on file    Years of education: Not on file    Highest education level: Not on file   Occupational History    Not on file   Tobacco Use    Smoking status: Never     Passive exposure: Never    Smokeless tobacco: Never   Vaping Use    Vaping Use: Never used   Substance and Sexual Activity    Alcohol use: Yes    Drug use: Never    Sexual activity: Not on file   Other Topics Concern    Parent/sibling w/ CABG, MI or angioplasty before 65F 55M? Not Asked   Social History Narrative    Not on file     Social Determinants of Health     Financial Resource Strain: Not on file   Food Insecurity: Not on file   Transportation Needs: Not on file   Physical Activity: Not on file   Stress: Not on file   Social Connections: Not on file   Interpersonal Safety: Not on file   Housing Stability: Not on file          Medications  Allergies   Current Outpatient Medications   Medication Sig Dispense Refill    amLODIPine (NORVASC) 5 MG tablet Take 1 tablet (5 mg) by mouth daily 90 tablet 2    aspirin 81 MG EC tablet Take 81 mg by mouth daily      EPINEPHrine (ANY BX GENERIC EQUIV) 0.3 MG/0.3ML injection 2-pack Inject 0.3 mLs (0.3 mg) into the muscle as needed  for anaphylaxis May repeat one time in 5-15 minutes if response to initial dose is inadequate. 2 each 1    hydrochlorothiazide (MICROZIDE) 12.5 MG capsule Take 1 capsule (12.5 mg) by mouth daily 90 capsule 2    lisinopril (ZESTRIL) 40 MG tablet Take 1 tablet (40 mg) by mouth daily 90 tablet 2    Multiple Vitamins-Minerals (DAILY MULTI PO) Take 1 tablet by mouth daily      Multiple Vitamins-Minerals (ICAPS AREDS 2 PO) Take 1 capsule by mouth 2 times daily      pravastatin (PRAVACHOL) 20 MG tablet Take 1 tablet (20 mg) by mouth daily 90 tablet 3    timolol maleate (TIMOPTIC) 0.5 % ophthalmic solution Apply 1 drop to eye daily      Allergies   Allergen Reactions    Hydrocodone Nausea and Vomiting    Cat Hair Extract Other (See Comments)     Sneezing     Dust Mites Other (See Comments)     Sneezing     Oxycodone Nausea    Pollen Extract Other (See Comments)     Sneezing          Lab Results    Chemistry/lipid CBC Cardiac Enzymes/BNP/TSH/INR   Lab Results   Component Value Date    CHOL 238 (H) 03/30/2023    HDL 89 03/30/2023    TRIG 71 03/30/2023    BUN 16.7 03/30/2023     03/30/2023    CO2 28 03/30/2023    Lab Results   Component Value Date    WBC 5.1 03/30/2023    HGB 15.4 03/30/2023    HCT 45.6 03/30/2023    MCV 92 03/30/2023     03/30/2023    Lab Results   Component Value Date    TSH 2.10 06/05/2023              Thank you for allowing me to participate in the care of your patient.      Sincerely,     Haris Saeed MD     [facility]  cc:   Jason Kelley MD  [address] [place] [id number]

## 2023-10-03 NOTE — PROGRESS NOTES
"    [facility]  [phone]          Assessment/Recommendations   Patient with elevated calcium score, primarily in the left anterior descending coronary artery.  He does exercise and has not noticed any changes in his functional capacity and would like to be proactive on preventing cardiac events.  I would like him to exercise vigorously as well and because of the LAD location of most of the calcium would recommend a stress echocardiogram.  The stress echocardiogram will be excellent at looking at the anterior wall associated with the LAD and if that test is unremarkable, we will continue to push him towards regular and vigorous exercise.  His blood pressure is well controlled on current medical regimen.  He is on an antiplatelet agent.    He had an intolerance to the statin but is trying them again and would recommend a repeat lipid panel in 3 months.    We discussed diet, alcohol consumption, and carbohydrate reduction and I also recommended that he read the book \"the science of a long life\" by Dr. Rosmery Lewis.    We will get back to him with the results of the stress echocardiogram and any further recommendations.       History of Present Illness/Subjective    Mr. Gaurang Saeed is a 78 year old male with known hypertension who also had a recent calcium score which was over 200, most of which was in the left anterior descending coronary artery.  The patient is a regular exercising routine going to a fitness center 3 times a week and exercising vigorously.  He has no changes in his functional capacity.  His spouse says that his energy level has deteriorated some but not dramatically so.  He denies orthopnea, paroxysmal nocturnal dyspnea, peripheral edema, syncope, near syncope and has no history of rheumatic fever, cerebrovascular accident or TIA.  He denies any chest discomfort, chest tightness, jaw discomfort, or unusual shortness of breath with activity.    He is not diabetic but has " "been treated for hypertension.  His father  of a myocardial infarction at age 53.  Patient is a non-smoker.  LDL cholesterol is in the 1 30-1 40 range.  He just restarted a statin.    The patient grew up in [facility].  He worked as an  and eventually as a  and is now retired.  He has 2 children neither of whom have heart problems.  He is  and his spouse is with him at the time of our meeting.           Physical Examination Review of Systems   /78 (BP Location: Right arm, Patient Position: Sitting, Cuff Size: Adult Regular)   Pulse 68   Resp 14   Ht 1.753 m (5' 9\")   Wt 68.9 kg (152 lb)   BMI 22.45 kg/m    Body mass index is 22.45 kg/m .  Wt Readings from Last 3 Encounters:   10/03/23 68.9 kg (152 lb)   09/15/23 69.1 kg (152 lb 4.8 oz)   23 69.6 kg (153 lb 6.4 oz)     General Appearance:   Alert, cooperative and in no acute distress.   ENT/Mouth: Pink/moist oral mucosa   EYES:  no scleral icterus, normal conjunctivae   Neck: JVP normal. No Hepatojugular reflux. Thyroid not visualized.   Chest/Lungs:   Lungs are clear to auscultation, equal chest wall expansion.   Cardiovascular:   S1, S2 without murmur ,clicks or rubs. Brachial, radial and posterior tibial pulses are intact and symetric. No carotid bruits noted   Abdomen:  Nontender. BS+. No bruits.   Extremities: No cyanosis, clubbing or edema   Skin: no xanthelasma, warm.    Neurologic: normal arm movement bilateral, no tremors     Psychiatric: Appropriate affect.      Enc Vitals  BP: 116/78  Pulse: 68  Resp: 14  Weight: 68.9 kg (152 lb)  Height: 175.3 cm (5' 9\")                                           Medical History  Surgical History Family History Social History   Past Medical History:   Diagnosis Date    Basal cell carcinoma     Squamous cell carcinoma     Past Surgical History:   Procedure Laterality Date    APPENDECTOMY          COLOGUARD([employer]ACT SCIENCES)  2021    Cologurard - Negative    " COLONOSCOPY  08/17/2011    Negative. Repeat in 10 years.    COLONOSCOPY  03/15/1996    CT CALCIUM SCREENING  03/25/2014    Total Agatston calcium score is 62    MOHS MICROGRAPHIC PROCEDURE      NO HISTORY OF SURGERY      RECTAL BIOPSY  03/15/1996    Family History   Problem Relation Age of Onset    Hypertension Mother     Hypothyroidism Mother     Myocardial Infarction Father     Substance Abuse Father     Cancer No family hx of         no history of skin in family    Social History     Socioeconomic History    Marital status:      Spouse name: Not on file    Number of children: Not on file    Years of education: Not on file    Highest education level: Not on file   Occupational History    Not on file   Tobacco Use    Smoking status: Never     Passive exposure: Never    Smokeless tobacco: Never   Vaping Use    Vaping Use: Never used   Substance and Sexual Activity    Alcohol use: Yes    Drug use: Never    Sexual activity: Not on file   Other Topics Concern    Parent/sibling w/ CABG, MI or angioplasty before 65F 55M? Not Asked   Social History Narrative    Not on file     Social Determinants of Health     Financial Resource Strain: Not on file   Food Insecurity: Not on file   Transportation Needs: Not on file   Physical Activity: Not on file   Stress: Not on file   Social Connections: Not on file   Interpersonal Safety: Not on file   Housing Stability: Not on file          Medications  Allergies   Current Outpatient Medications   Medication Sig Dispense Refill    amLODIPine (NORVASC) 5 MG tablet Take 1 tablet (5 mg) by mouth daily 90 tablet 2    aspirin 81 MG EC tablet Take 81 mg by mouth daily      EPINEPHrine (ANY BX GENERIC EQUIV) 0.3 MG/0.3ML injection 2-pack Inject 0.3 mLs (0.3 mg) into the muscle as needed for anaphylaxis May repeat one time in 5-15 minutes if response to initial dose is inadequate. 2 each 1    hydrochlorothiazide (MICROZIDE) 12.5 MG capsule Take 1 capsule (12.5 mg) by mouth daily 90  capsule 2    lisinopril (ZESTRIL) 40 MG tablet Take 1 tablet (40 mg) by mouth daily 90 tablet 2    Multiple Vitamins-Minerals (DAILY MULTI PO) Take 1 tablet by mouth daily      Multiple Vitamins-Minerals (ICAPS AREDS 2 PO) Take 1 capsule by mouth 2 times daily      pravastatin (PRAVACHOL) 20 MG tablet Take 1 tablet (20 mg) by mouth daily 90 tablet 3    timolol maleate (TIMOPTIC) 0.5 % ophthalmic solution Apply 1 drop to eye daily      Allergies   Allergen Reactions    Hydrocodone Nausea and Vomiting    Cat Hair Extract Other (See Comments)     Sneezing     Dust Mites Other (See Comments)     Sneezing     Oxycodone Nausea    Pollen Extract Other (See Comments)     Sneezing          Lab Results    Chemistry/lipid CBC Cardiac Enzymes/BNP/TSH/INR   Lab Results   Component Value Date    CHOL 238 (H) 03/30/2023    HDL 89 03/30/2023    TRIG 71 03/30/2023    BUN 16.7 03/30/2023     03/30/2023    CO2 28 03/30/2023    Lab Results   Component Value Date    WBC 5.1 03/30/2023    HGB 15.4 03/30/2023    HCT 45.6 03/30/2023    MCV 92 03/30/2023     03/30/2023    Lab Results   Component Value Date    TSH 2.10 06/05/2023

## 2023-10-07 NOTE — NURSING NOTE
"Dermatology Rooming Note    Gaurang Saeed's goals for this visit include:   Chief Complaint   Patient presents with     Derm Problem     Patient comes to clinic today for a skin exam. States concern with some \"things\" on his face.     Emma Valle, [facility]    " 100

## 2023-10-12 ENCOUNTER — HOSPITAL ENCOUNTER (OUTPATIENT)
Dept: CARDIOLOGY | Facility: CLINIC | Age: 78
Discharge: HOME | End: 2023-10-12
Attending: INTERNAL MEDICINE | Admitting: INTERNAL MEDICINE
Payer: MEDICARE

## 2023-10-12 DIAGNOSIS — R93.1 ABNORMAL HEART SCORE CT: ICD-10-CM

## 2023-10-12 DIAGNOSIS — R93.1 ELEVATED CORONARY ARTERY CALCIUM SCORE: ICD-10-CM

## 2023-10-12 DIAGNOSIS — Z78.9 STATIN INTOLERANCE: ICD-10-CM

## 2023-10-12 DIAGNOSIS — I10 BENIGN ESSENTIAL HYPERTENSION: ICD-10-CM

## 2023-10-12 PROCEDURE — 93016 CV STRESS TEST SUPVJ ONLY: CPT | Performed by: INTERNAL MEDICINE

## 2023-10-12 PROCEDURE — 93325 DOPPLER ECHO COLOR FLOW MAPG: CPT | Mod: TC

## 2023-10-12 PROCEDURE — 93325 DOPPLER ECHO COLOR FLOW MAPG: CPT | Mod: 26 | Performed by: INTERNAL MEDICINE

## 2023-10-12 PROCEDURE — 93350 STRESS TTE ONLY: CPT | Mod: TC

## 2023-10-12 PROCEDURE — 93018 CV STRESS TEST I&R ONLY: CPT | Performed by: INTERNAL MEDICINE

## 2023-10-12 PROCEDURE — 93321 DOPPLER ECHO F-UP/LMTD STD: CPT | Mod: 26 | Performed by: INTERNAL MEDICINE

## 2023-10-12 PROCEDURE — 93017 CV STRESS TEST TRACING ONLY: CPT

## 2023-10-12 PROCEDURE — 93350 STRESS TTE ONLY: CPT | Mod: 26 | Performed by: INTERNAL MEDICINE

## 2023-11-29 ENCOUNTER — IMMUNIZATION (OUTPATIENT)
Dept: FAMILY MEDICINE | Facility: CLINIC | Age: 78
End: 2023-11-29
Payer: MEDICARE

## 2023-11-29 DIAGNOSIS — Z23 NEED FOR VACCINATION: Primary | ICD-10-CM

## 2023-11-29 PROCEDURE — 90480 ADMN SARSCOV2 VAC 1/ONLY CMP: CPT

## 2023-11-29 PROCEDURE — 99207 PR NO CHARGE NURSE ONLY: CPT

## 2023-11-29 PROCEDURE — 91320 SARSCV2 VAC 30MCG TRS-SUC IM: CPT

## 2023-11-29 NOTE — PROGRESS NOTES
Prior to immunization administration, verified patients identity using patient s name and date of birth. Please see Immunization Activity for additional information.     Screening Questionnaire for Adult Immunization    Are you sick today?   No   Do you have allergies to medications, food, a vaccine component or latex?   No   Have you ever had a serious reaction after receiving a vaccination?   No   Do you have a long-term health problem with heart, lung, kidney, or metabolic disease (e.g., diabetes), asthma, a blood disorder, no spleen, complement component deficiency, a cochlear implant, or a spinal fluid leak?  Are you on long-term aspirin therapy?   No   Do you have cancer, leukemia, HIV/AIDS, or any other immune system problem?   No   Do you have a parent, brother, or sister with an immune system problem?   No   In the past 3 months, have you taken medications that affect  your immune system, such as prednisone, other steroids, or anticancer drugs; drugs for the treatment of rheumatoid arthritis, Crohn s disease, or psoriasis; or have you had radiation treatments?   No   Have you had a seizure, or a brain or other nervous system problem?   No   During the past year, have you received a transfusion of blood or blood    products, or been given immune (gamma) globulin or antiviral drug?   No   For women: Are you pregnant or is there a chance you could become       pregnant during the next month?   No   Have you received any vaccinations in the past 4 weeks?   No     Immunization questionnaire answers were all negative.    I have reviewed the following standing orders:   This patient is due and qualifies for the Covid-19 vaccine.     Click here for COVID-19 Standing Order    I have reviewed the vaccines inclusion and exclusion criteria; No concerns regarding eligibility.     Patient instructed to remain in clinic for 15 minutes afterwards, and to report any adverse reactions.     Screening performed by Nicole Llanos  LPN on 11/29/2023 at 10:40 AM.

## 2024-01-02 ENCOUNTER — DOCUMENTATION ONLY (OUTPATIENT)
Dept: FAMILY MEDICINE | Facility: CLINIC | Age: 79
End: 2024-01-02
Payer: MEDICARE

## 2024-01-02 DIAGNOSIS — E78.5 HYPERLIPIDEMIA LDL GOAL <100: Primary | ICD-10-CM

## 2024-01-02 NOTE — PROGRESS NOTES
Patient has upcoming lab appointment. Patient mentioned in appointment notes that it's for a fasting lipid. If you would like this done, please place orders. Thanks.

## 2024-01-03 ENCOUNTER — LAB (OUTPATIENT)
Dept: LAB | Facility: CLINIC | Age: 79
End: 2024-01-03
Payer: MEDICARE

## 2024-01-03 DIAGNOSIS — E78.5 HYPERLIPIDEMIA LDL GOAL <100: ICD-10-CM

## 2024-01-03 LAB
CHOLEST SERPL-MCNC: 166 MG/DL
FASTING STATUS PATIENT QL REPORTED: YES
HDLC SERPL-MCNC: 68 MG/DL
LDLC SERPL CALC-MCNC: 86 MG/DL
NONHDLC SERPL-MCNC: 98 MG/DL
TRIGL SERPL-MCNC: 61 MG/DL

## 2024-01-03 PROCEDURE — 80061 LIPID PANEL: CPT

## 2024-01-03 PROCEDURE — 36415 COLL VENOUS BLD VENIPUNCTURE: CPT

## 2024-01-10 DIAGNOSIS — I10 BENIGN ESSENTIAL HYPERTENSION: ICD-10-CM

## 2024-01-10 DIAGNOSIS — E78.00 PURE HYPERCHOLESTEROLEMIA: ICD-10-CM

## 2024-01-10 RX ORDER — LISINOPRIL 40 MG/1
TABLET ORAL
Qty: 90 TABLET | Refills: 1 | Status: SHIPPED | OUTPATIENT
Start: 2024-01-10 | End: 2024-03-22

## 2024-01-10 RX ORDER — PRAVASTATIN SODIUM 20 MG
TABLET ORAL
Qty: 90 TABLET | Refills: 1 | Status: SHIPPED | OUTPATIENT
Start: 2024-01-10 | End: 2024-03-22

## 2024-02-29 ENCOUNTER — PATIENT OUTREACH (OUTPATIENT)
Dept: CARE COORDINATION | Facility: CLINIC | Age: 79
End: 2024-02-29
Payer: MEDICARE

## 2024-03-14 ENCOUNTER — PATIENT OUTREACH (OUTPATIENT)
Dept: CARE COORDINATION | Facility: CLINIC | Age: 79
End: 2024-03-14
Payer: MEDICARE

## 2024-03-18 ENCOUNTER — MYC MEDICAL ADVICE (OUTPATIENT)
Dept: FAMILY MEDICINE | Facility: CLINIC | Age: 79
End: 2024-03-18
Payer: MEDICARE

## 2024-03-18 DIAGNOSIS — I10 BENIGN ESSENTIAL HYPERTENSION: Primary | ICD-10-CM

## 2024-03-19 ENCOUNTER — DOCUMENTATION ONLY (OUTPATIENT)
Dept: FAMILY MEDICINE | Facility: CLINIC | Age: 79
End: 2024-03-19

## 2024-03-19 ENCOUNTER — LAB (OUTPATIENT)
Dept: LAB | Facility: CLINIC | Age: 79
End: 2024-03-19
Payer: MEDICARE

## 2024-03-19 DIAGNOSIS — E78.00 PURE HYPERCHOLESTEROLEMIA: Primary | ICD-10-CM

## 2024-03-19 DIAGNOSIS — E78.00 PURE HYPERCHOLESTEROLEMIA: ICD-10-CM

## 2024-03-19 DIAGNOSIS — I10 BENIGN ESSENTIAL HYPERTENSION: ICD-10-CM

## 2024-03-19 LAB
ANION GAP SERPL CALCULATED.3IONS-SCNC: 8 MMOL/L (ref 7–15)
BASOPHILS # BLD AUTO: 0 10E3/UL (ref 0–0.2)
BASOPHILS NFR BLD AUTO: 0 %
BUN SERPL-MCNC: 20 MG/DL (ref 8–23)
CALCIUM SERPL-MCNC: 9.5 MG/DL (ref 8.8–10.2)
CHLORIDE SERPL-SCNC: 104 MMOL/L (ref 98–107)
CHOLEST SERPL-MCNC: 179 MG/DL
CREAT SERPL-MCNC: 0.97 MG/DL (ref 0.67–1.17)
DEPRECATED HCO3 PLAS-SCNC: 29 MMOL/L (ref 22–29)
EGFRCR SERPLBLD CKD-EPI 2021: 80 ML/MIN/1.73M2
EOSINOPHIL # BLD AUTO: 0.2 10E3/UL (ref 0–0.7)
EOSINOPHIL NFR BLD AUTO: 4 %
ERYTHROCYTE [DISTWIDTH] IN BLOOD BY AUTOMATED COUNT: 13.1 % (ref 10–15)
GLUCOSE SERPL-MCNC: 95 MG/DL (ref 70–99)
HCT VFR BLD AUTO: 43.4 % (ref 40–53)
HDLC SERPL-MCNC: 72 MG/DL
HGB BLD-MCNC: 14.3 G/DL (ref 13.3–17.7)
IMM GRANULOCYTES # BLD: 0 10E3/UL
IMM GRANULOCYTES NFR BLD: 0 %
LDLC SERPL CALC-MCNC: 94 MG/DL
LYMPHOCYTES # BLD AUTO: 1.2 10E3/UL (ref 0.8–5.3)
LYMPHOCYTES NFR BLD AUTO: 23 %
MCH RBC QN AUTO: 30.3 PG (ref 26.5–33)
MCHC RBC AUTO-ENTMCNC: 32.9 G/DL (ref 31.5–36.5)
MCV RBC AUTO: 92 FL (ref 78–100)
MONOCYTES # BLD AUTO: 0.5 10E3/UL (ref 0–1.3)
MONOCYTES NFR BLD AUTO: 10 %
NEUTROPHILS # BLD AUTO: 3.4 10E3/UL (ref 1.6–8.3)
NEUTROPHILS NFR BLD AUTO: 63 %
NONHDLC SERPL-MCNC: 107 MG/DL
PLATELET # BLD AUTO: 252 10E3/UL (ref 150–450)
POTASSIUM SERPL-SCNC: 4.6 MMOL/L (ref 3.4–5.3)
RBC # BLD AUTO: 4.72 10E6/UL (ref 4.4–5.9)
SODIUM SERPL-SCNC: 141 MMOL/L (ref 135–145)
TRIGL SERPL-MCNC: 64 MG/DL
WBC # BLD AUTO: 5.4 10E3/UL (ref 4–11)

## 2024-03-19 PROCEDURE — 80048 BASIC METABOLIC PNL TOTAL CA: CPT

## 2024-03-19 PROCEDURE — 36415 COLL VENOUS BLD VENIPUNCTURE: CPT

## 2024-03-19 PROCEDURE — 85025 COMPLETE CBC W/AUTO DIFF WBC: CPT | Mod: QW

## 2024-03-19 PROCEDURE — 80061 LIPID PANEL: CPT

## 2024-03-19 NOTE — PROGRESS NOTES
Gaurang was here for lab this am. We brett him for a CBC and BMP. Gaurang thought he was to have a Lipid panel also. If needed please add on.     Thanks,  Tatiana

## 2024-03-22 ENCOUNTER — OFFICE VISIT (OUTPATIENT)
Dept: FAMILY MEDICINE | Facility: CLINIC | Age: 79
End: 2024-03-22
Attending: FAMILY MEDICINE
Payer: MEDICARE

## 2024-03-22 VITALS
WEIGHT: 158.6 LBS | SYSTOLIC BLOOD PRESSURE: 144 MMHG | OXYGEN SATURATION: 99 % | BODY MASS INDEX: 23.49 KG/M2 | HEART RATE: 57 BPM | RESPIRATION RATE: 16 BRPM | TEMPERATURE: 97.3 F | HEIGHT: 69 IN | DIASTOLIC BLOOD PRESSURE: 84 MMHG

## 2024-03-22 DIAGNOSIS — R93.1 ABNORMAL HEART SCORE CT: ICD-10-CM

## 2024-03-22 DIAGNOSIS — Z00.00 ENCOUNTER FOR MEDICARE ANNUAL WELLNESS EXAM: Primary | ICD-10-CM

## 2024-03-22 DIAGNOSIS — Z91.030 BEE STING ALLERGY: ICD-10-CM

## 2024-03-22 DIAGNOSIS — N40.0 BENIGN PROSTATIC HYPERPLASIA WITHOUT URINARY OBSTRUCTION: ICD-10-CM

## 2024-03-22 DIAGNOSIS — E78.00 PURE HYPERCHOLESTEROLEMIA: ICD-10-CM

## 2024-03-22 DIAGNOSIS — C44.310 BCC (BASAL CELL CARCINOMA), FACE: ICD-10-CM

## 2024-03-22 DIAGNOSIS — I10 BENIGN ESSENTIAL HYPERTENSION: ICD-10-CM

## 2024-03-22 PROCEDURE — G0439 PPPS, SUBSEQ VISIT: HCPCS | Performed by: FAMILY MEDICINE

## 2024-03-22 PROCEDURE — 99214 OFFICE O/P EST MOD 30 MIN: CPT | Mod: 25 | Performed by: FAMILY MEDICINE

## 2024-03-22 RX ORDER — PRAVASTATIN SODIUM 20 MG
TABLET ORAL
Qty: 90 TABLET | Refills: 3 | Status: SHIPPED | OUTPATIENT
Start: 2024-03-22

## 2024-03-22 RX ORDER — HYDROCHLOROTHIAZIDE 12.5 MG/1
1 CAPSULE ORAL DAILY
Qty: 90 CAPSULE | Refills: 3 | Status: SHIPPED | OUTPATIENT
Start: 2024-03-22

## 2024-03-22 RX ORDER — LISINOPRIL 40 MG/1
TABLET ORAL
Qty: 90 TABLET | Refills: 3 | Status: SHIPPED | OUTPATIENT
Start: 2024-03-22

## 2024-03-22 RX ORDER — AMLODIPINE BESYLATE 5 MG/1
5 TABLET ORAL DAILY
Qty: 90 TABLET | Refills: 3 | Status: SHIPPED | OUTPATIENT
Start: 2024-03-22

## 2024-03-22 SDOH — HEALTH STABILITY: PHYSICAL HEALTH: ON AVERAGE, HOW MANY DAYS PER WEEK DO YOU ENGAGE IN MODERATE TO STRENUOUS EXERCISE (LIKE A BRISK WALK)?: 5 DAYS

## 2024-03-22 ASSESSMENT — SOCIAL DETERMINANTS OF HEALTH (SDOH): HOW OFTEN DO YOU GET TOGETHER WITH FRIENDS OR RELATIVES?: ONCE A WEEK

## 2024-03-22 NOTE — PROGRESS NOTES
Preventive Care Visit  [facility]  Jason Kelley MD, Family Medicine  Mar 22, 2024      Assessment & Plan     Encounter for Medicare annual wellness exam  Healthcare maintenance reviewed advance directives reviewed    Pure hypercholesterolemia  Controlled with pravastatin    Benign essential hypertension  Controlled with lisinopril hydrochlorothiazide and amlodipine    Abnormal Heart Score CT  On pravastatin    Benign prostatic hyperplasia without urinary obstruction  Tolerable symptoms    BCC (basal cell carcinoma), face  No recurrences    Bee sting allergy  Has EpiPen              Counseling  Appropriate preventive services were discussed with this patient, including applicable screening as appropriate for fall prevention, nutrition, physical activity, Tobacco-use cessation, weight loss and cognition.  Checklist reviewing preventive services available has been given to the patient.  Reviewed patient's diet, addressing concerns and/or questions.   The patient was provided with written information regarding signs of hearing loss.           Lonnie Merlos is a 78 year old, presenting for the following: Overall doing well.  [place] outside [place] with his wife Edna.  Retired .  Exercises at least 4 days a week.  Physical (Annual wellness )        3/22/2024    10:04 AM   Additional Questions   Roomed by MICHAEL Min         Health Care Directive  Patient has a Health Care Directive on file  Discussed advance care planning with patient.    HPI      Patient Active Problem List   Diagnosis    BCC (basal cell carcinoma), face    Freddy's disease    Seasonal allergic rhinitis    Palpitations    Benign essential hypertension    Hyperlipidemia    History of parasitic disease    Benign prostatic hyperplasia without urinary obstruction    Hearing loss in left ear    Glaucoma suspect, bilateral    Allergic reaction to bee sting    Statin intolerance     Elevated coronary artery calcium score     Current Outpatient Medications   Medication    amLODIPine (NORVASC) 5 MG tablet    aspirin 81 MG EC tablet    EPINEPHrine (ANY BX GENERIC EQUIV) 0.3 MG/0.3ML injection 2-pack    hydrochlorothiazide (MICROZIDE) 12.5 MG capsule    lisinopril (ZESTRIL) 40 MG tablet    Multiple Vitamins-Minerals (DAILY MULTI PO)    Multiple Vitamins-Minerals (ICAPS AREDS 2 PO)    pravastatin (PRAVACHOL) 20 MG tablet    timolol maleate (TIMOPTIC) 0.5 % ophthalmic solution     No current facility-administered medications for this visit.           3/22/2024   General Health   How would you rate your overall physical health? Good   Feel stress (tense, anxious, or unable to sleep) Not at all         3/22/2024   Nutrition   Diet: Regular (no restrictions)         3/22/2024   Exercise   Days per week of moderate/strenous exercise 5 days         3/22/2024   Social Factors   Frequency of gathering with friends or relatives Once a week   Worry food won't last until get money to buy more No   Food not last or not have enough money for food? No   Do you have housing?  Yes   Are you worried about losing your housing? No   Lack of transportation? No   Unable to get utilities (heat,electricity)? No         3/30/2023   Fall Risk   Fallen 2 or more times in the past year? No          3/22/2024   Activities of Daily Living- Home Safety   Needs help with the following daily activites None of the above   Safety concerns in the home None of the above         3/22/2024   Dental   Dentist two times every year? Yes         3/22/2024   Hearing Screening   Hearing concerns? (!) I NEED TO ASK PEOPLE TO SPEAK UP OR REPEAT THEMSELVES.         3/22/2024   Driving Risk Screening   Patient/family members have concerns about driving No         3/22/2024   General Alertness/Fatigue Screening   Have you been more tired than usual lately? No         3/22/2024   Urinary Incontinence Screening   Bothered by leaking urine in past 6  months No         3/22/2024   TB Screening   Were you born outside of the [place]? No         Today's PHQ-2 Score:       3/22/2024    10:05 AM   PHQ-2 ( 1999 Pf[employer])   Q1: Little interest or pleasure in doing things 0   Q2: Feeling down, depressed or hopeless 0   PHQ-2 Score 0   Q1: Little interest or pleasure in doing things Not at all   Q2: Feeling down, depressed or hopeless Not at all   PHQ-2 Score 0           3/22/2024   Substance Use   Alcohol more than 3/day or more than 7/wk No   Do you have a current opioid prescription? No   How severe/bad is pain from 1 to 10? 0/10 (No Pain)   Do you use any other substances recreationally? No     Social History     Tobacco Use    Smoking status: Never     Passive exposure: Never    Smokeless tobacco: Never   Vaping Use    Vaping Use: Never used   Substance Use Topics    Alcohol use: Yes    Drug use: Never       ASCVD Risk   The 10-year ASCVD risk score (Gatito KHAN, et al., 2019) is: 35.8%    Values used to calculate the score:      Age: 78 years      Sex: Male      Is Non- : No      Diabetic: No      Tobacco smoker: No      Systolic Blood Pressure: 144 mmHg      Is BP treated: Yes      HDL Cholesterol: 72 mg/dL      Total Cholesterol: 179 mg/dL            Reviewed and updated as needed this visit by Provider                      Current providers sharing in care for this patient include:  Patient Care Team:  Jason Kelley MD as PCP - General (Family Medicine)  Jason Kelley MD as Assigned PCP  Raman Hoang MD as MD (Cardiovascular Disease)  Haris Saeed MD as MD (Cardiovascular Disease)  Haris Saeed MD as Assigned Heart and Vascular Provider    The following health maintenance items are reviewed in Epic and correct as of today:  Health Maintenance   Topic Date Due    IPV IMMUNIZATION (2 of 3 - Adult catch-up series) 05/04/1995    RSV VACCINE (Pregnancy & 60+) (1 - 1-dose 60+ series) Never done    INFLUENZA VACCINE (1)  "09/01/2023    MEDICARE ANNUAL WELLNESS VISIT  03/30/2024    FALL RISK ASSESSMENT  03/30/2024    COLORECTAL CANCER SCREENING  06/23/2024    ANNUAL REVIEW OF HM ORDERS  07/05/2024    LIPID  03/19/2025    DTAP/TDAP/TD IMMUNIZATION (5 - Td or Tdap) 08/11/2026    GLUCOSE  03/19/2027    ADVANCE CARE PLANNING  03/30/2028    HEPATITIS C SCREENING  Completed    PHQ-2 (once per calendar year)  Completed    Pneumococcal Vaccine: 65+ Years  Completed    ZOSTER IMMUNIZATION  Completed    COVID-19 Vaccine  Completed    HPV IMMUNIZATION  Aged Out    MENINGITIS IMMUNIZATION  Aged Out    RSV MONOCLONAL ANTIBODY  Aged Out         Review of Systems  Constitutional, HEENT, cardiovascular, pulmonary, gi and gu systems are negative, except as otherwise noted.     Objective    Exam  BP (!) 144/84 (BP Location: Right arm, Patient Position: Sitting, Cuff Size: Adult Regular)   Pulse 57   Temp 97.3  F (36.3  C) (Oral)   Resp 16   Ht 1.753 m (5' 9\")   Wt 71.9 kg (158 lb 9.6 oz)   SpO2 99%   BMI 23.42 kg/m     Estimated body mass index is 23.42 kg/m  as calculated from the following:    Height as of this encounter: 1.753 m (5' 9\").    Weight as of this encounter: 71.9 kg (158 lb 9.6 oz).    Physical Exam  GENERAL: alert and no distress  NECK: no adenopathy, no asymmetry, masses, or scars  RESP: lungs clear to auscultation - no rales, rhonchi or wheezes  CV: regular rate and rhythm, normal S1 S2, no S3 or S4, no murmur, click or rub, no peripheral edema  ABDOMEN: soft, nontender, no hepatosplenomegaly, no masses and bowel sounds normal  MS: no gross musculoskeletal defects noted, no edema         No data to display                   Cognitive testing normal    Signed Electronically by: Jason Kelley MD    "

## 2024-07-27 ENCOUNTER — OFFICE VISIT (OUTPATIENT)
Dept: URGENT CARE | Facility: URGENT CARE | Age: 79
End: 2024-07-27
Payer: MEDICARE

## 2024-07-27 VITALS
HEART RATE: 66 BPM | BODY MASS INDEX: 21.71 KG/M2 | RESPIRATION RATE: 16 BRPM | OXYGEN SATURATION: 97 % | TEMPERATURE: 97.5 F | DIASTOLIC BLOOD PRESSURE: 68 MMHG | SYSTOLIC BLOOD PRESSURE: 117 MMHG | WEIGHT: 147 LBS

## 2024-07-27 DIAGNOSIS — Z87.892 HISTORY OF ANAPHYLAXIS: Primary | ICD-10-CM

## 2024-07-27 PROCEDURE — 99213 OFFICE O/P EST LOW 20 MIN: CPT | Performed by: PHYSICIAN ASSISTANT

## 2024-07-27 RX ORDER — EPINEPHRINE 0.3 MG/.3ML
0.3 INJECTION SUBCUTANEOUS PRN
Qty: 2 EACH | Refills: 0 | Status: SHIPPED | OUTPATIENT
Start: 2024-07-27

## 2024-07-27 NOTE — PROGRESS NOTES
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  Discussed pt needs to be monitored in the ED, not urgent care setting and will go directly to [facility] now.  He has no severe sx at this time specifically normal vitals no throat or tongue swelling, no sob.  Refilled his epi for future use.   - EPINEPHrine (ANY BX GENERIC EQUIV) 0.3 MG/0.3ML injection 2-pack  Dispense: 2 each; Refill: 0           Purvi Villanueva PA-C  [facility]    Lonnie Merlos is a 79 year old male who presents to clinic today for the following health issues:  Chief Complaint   Patient presents with    Insect Bites     Stung by hornet around 100. Upper left arm used epi pen 45 min ago       HPI: rapidly assessed pt.    Pt reports he was stun by hornet 1 hour ago, given epi within 15 min with no symptoms but now developing eye itchy, swelling. Hx of anaphylaxis. No sob, throat swelling, rash.  No tongue swelling.          Objective    /68   Pulse 66   Temp 97.5  F (36.4  C) (Tympanic)   Resp 16   Wt 66.7 kg (147 lb)   SpO2 97%   BMI 21.71 kg/m    Physical Exam   Nad appears well  Mild facial swelling in the periorbital region.

## 2024-07-30 ENCOUNTER — MYC MEDICAL ADVICE (OUTPATIENT)
Dept: FAMILY MEDICINE | Facility: CLINIC | Age: 79
End: 2024-07-30
Payer: MEDICARE

## 2024-08-06 ENCOUNTER — OFFICE VISIT (OUTPATIENT)
Dept: FAMILY MEDICINE | Facility: CLINIC | Age: 79
End: 2024-08-06
Payer: MEDICARE

## 2024-08-06 ENCOUNTER — ORDERS ONLY (AUTO-RELEASED) (OUTPATIENT)
Dept: FAMILY MEDICINE | Facility: CLINIC | Age: 79
End: 2024-08-06

## 2024-08-06 VITALS
RESPIRATION RATE: 18 BRPM | HEART RATE: 55 BPM | WEIGHT: 148 LBS | SYSTOLIC BLOOD PRESSURE: 146 MMHG | BODY MASS INDEX: 21.86 KG/M2 | TEMPERATURE: 97.6 F | OXYGEN SATURATION: 99 % | DIASTOLIC BLOOD PRESSURE: 83 MMHG

## 2024-08-06 DIAGNOSIS — Z00.00 HEALTH CARE MAINTENANCE: ICD-10-CM

## 2024-08-06 DIAGNOSIS — R63.4 WEIGHT LOSS: Primary | ICD-10-CM

## 2024-08-06 DIAGNOSIS — Z12.11 SCREEN FOR COLON CANCER: ICD-10-CM

## 2024-08-06 DIAGNOSIS — Z11.4 ENCOUNTER FOR SCREENING FOR HUMAN IMMUNODEFICIENCY VIRUS (HIV): ICD-10-CM

## 2024-08-06 DIAGNOSIS — Z12.5 SCREENING FOR PROSTATE CANCER: ICD-10-CM

## 2024-08-06 LAB
ALBUMIN SERPL BCG-MCNC: 4.2 G/DL (ref 3.5–5.2)
ALP SERPL-CCNC: 92 U/L (ref 40–150)
ALT SERPL W P-5'-P-CCNC: 19 U/L (ref 0–70)
ANION GAP SERPL CALCULATED.3IONS-SCNC: 9 MMOL/L (ref 7–15)
AST SERPL W P-5'-P-CCNC: 22 U/L (ref 0–45)
BASOPHILS # BLD AUTO: 0 10E3/UL (ref 0–0.2)
BASOPHILS NFR BLD AUTO: 0 %
BILIRUB SERPL-MCNC: 0.6 MG/DL
BUN SERPL-MCNC: 17.8 MG/DL (ref 8–23)
CALCIUM SERPL-MCNC: 9.2 MG/DL (ref 8.8–10.4)
CHLORIDE SERPL-SCNC: 102 MMOL/L (ref 98–107)
CREAT SERPL-MCNC: 0.91 MG/DL (ref 0.67–1.17)
CRP SERPL-MCNC: <3 MG/L
EGFRCR SERPLBLD CKD-EPI 2021: 86 ML/MIN/1.73M2
EOSINOPHIL # BLD AUTO: 0.2 10E3/UL (ref 0–0.7)
EOSINOPHIL NFR BLD AUTO: 3 %
ERYTHROCYTE [DISTWIDTH] IN BLOOD BY AUTOMATED COUNT: 13.3 % (ref 10–15)
ERYTHROCYTE [SEDIMENTATION RATE] IN BLOOD BY WESTERGREN METHOD: 5 MM/HR (ref 0–20)
GLUCOSE SERPL-MCNC: 97 MG/DL (ref 70–99)
HCO3 SERPL-SCNC: 30 MMOL/L (ref 22–29)
HCT VFR BLD AUTO: 45 % (ref 40–53)
HGB BLD-MCNC: 14.6 G/DL (ref 13.3–17.7)
HIV 1+2 AB+HIV1 P24 AG SERPL QL IA: NONREACTIVE
IMM GRANULOCYTES # BLD: 0 10E3/UL
IMM GRANULOCYTES NFR BLD: 1 %
LYMPHOCYTES # BLD AUTO: 0.9 10E3/UL (ref 0.8–5.3)
LYMPHOCYTES NFR BLD AUTO: 15 %
MCH RBC QN AUTO: 29.8 PG (ref 26.5–33)
MCHC RBC AUTO-ENTMCNC: 32.4 G/DL (ref 31.5–36.5)
MCV RBC AUTO: 92 FL (ref 78–100)
MONOCYTES # BLD AUTO: 0.7 10E3/UL (ref 0–1.3)
MONOCYTES NFR BLD AUTO: 12 %
NEUTROPHILS # BLD AUTO: 3.8 10E3/UL (ref 1.6–8.3)
NEUTROPHILS NFR BLD AUTO: 69 %
PLATELET # BLD AUTO: 222 10E3/UL (ref 150–450)
POTASSIUM SERPL-SCNC: 4 MMOL/L (ref 3.4–5.3)
PROT SERPL-MCNC: 7.2 G/DL (ref 6.4–8.3)
PSA SERPL DL<=0.01 NG/ML-MCNC: 0.97 NG/ML (ref 0–6.5)
RBC # BLD AUTO: 4.9 10E6/UL (ref 4.4–5.9)
SODIUM SERPL-SCNC: 141 MMOL/L (ref 135–145)
TSH SERPL DL<=0.005 MIU/L-ACNC: 2.23 UIU/ML (ref 0.3–4.2)
WBC # BLD AUTO: 5.5 10E3/UL (ref 4–11)

## 2024-08-06 PROCEDURE — 85652 RBC SED RATE AUTOMATED: CPT | Performed by: FAMILY MEDICINE

## 2024-08-06 PROCEDURE — G0103 PSA SCREENING: HCPCS | Performed by: FAMILY MEDICINE

## 2024-08-06 PROCEDURE — 99214 OFFICE O/P EST MOD 30 MIN: CPT | Performed by: FAMILY MEDICINE

## 2024-08-06 PROCEDURE — 84443 ASSAY THYROID STIM HORMONE: CPT | Performed by: FAMILY MEDICINE

## 2024-08-06 PROCEDURE — 80053 COMPREHEN METABOLIC PANEL: CPT | Performed by: FAMILY MEDICINE

## 2024-08-06 PROCEDURE — 86140 C-REACTIVE PROTEIN: CPT | Performed by: FAMILY MEDICINE

## 2024-08-06 PROCEDURE — 85025 COMPLETE CBC W/AUTO DIFF WBC: CPT | Mod: QW | Performed by: FAMILY MEDICINE

## 2024-08-06 PROCEDURE — 87389 HIV-1 AG W/HIV-1&-2 AB AG IA: CPT | Performed by: FAMILY MEDICINE

## 2024-08-06 PROCEDURE — 36415 COLL VENOUS BLD VENIPUNCTURE: CPT | Performed by: FAMILY MEDICINE

## 2024-08-06 NOTE — PROGRESS NOTES
Assessment & Plan     Weight loss  Concerning weight loss we will get labs through that imaging to look at chest abdomen pelvis and follow-up from there.    - CBC with Platelets & Differential  - Comprehensive metabolic panel  - TSH with free T4 reflex  - ESR: Erythrocyte sedimentation rate  - CRP inflammation  - CT Chest/Abdomen/Pelvis w Contrast; Future  - HIV Antigen Antibody Combo; Future            Subjective   Gaurang is a 79 year old, presenting for the following health issues: Patient into the last 4 months he has dropped about 10 pounds.  He notes that about every other time he eats he can only takes a few bites and he is just full really does not feel he can eat anymore.  Denies any headache blurry vision double vision ears nose throat complaint no chest pain shortness of breath cough no dysphagia no abdominal pain no constipation he has had 2 brief bouts of diarrhea in the last 4 months but nothing consistent.  No black or bloody stools no urinary complaints no extremity complaints no rashes  Weight Loss        8/6/2024     9:07 AM   Additional Questions   Roomed by Judi DELUNA     History of Present Illness       Reason for visit:  Sudden weight loss  Symptom onset:  More than a month    He eats 2-3 servings of fruits and vegetables daily.He consumes 0 sweetened beverage(s) daily.He exercises with enough effort to increase his heart rate 20 to 29 minutes per day.  He exercises with enough effort to increase his heart rate 4 days per week.   He is taking medications regularly.                 Review of Systems  Constitutional, HEENT, cardiovascular, pulmonary, GI, , musculoskeletal, neuro, skin, endocrine and psych systems are negative, except as otherwise noted.      Objective    BP (!) 150/83   Pulse 55   Temp 97.6  F (36.4  C)   Resp 18   Wt 67.1 kg (148 lb)   SpO2 99%   BMI 21.86 kg/m    Body mass index is 21.86 kg/m .  Physical Exam   GENERAL: alert and no distress  EYES: Eyes grossly  normal to inspection, PERRL and conjunctivae and sclerae normal  HENT: ear canals and TM's normal, nose and mouth without ulcers or lesions  NECK: no adenopathy, no asymmetry, masses, or scars  RESP: lungs clear to auscultation - no rales, rhonchi or wheezes  CV: regular rate and rhythm, normal S1 S2, no S3 or S4, no murmur, click or rub, no peripheral edema  ABDOMEN: soft, nontender, no hepatosplenomegaly, no masses and bowel sounds normal  MS: no gross musculoskeletal defects noted, no edema  SKIN: no suspicious lesions or rashes  NEURO: Normal strength and tone, mentation intact and speech normal  PSYCH: mentation appears normal, affect normal/bright            Signed Electronically by: Jason Kelley MD

## 2024-08-06 NOTE — LETTER
August 6, 2024      Gaurang Saeed  [address] [place] [id number]        Dear ,    We are writing to inform you of your test results.    Your test results fall within the expected range(s) or remain unchanged from previous results.  Please continue with current treatment plan.    Resulted Orders   Comprehensive metabolic panel   Result Value Ref Range    Sodium 141 135 - 145 mmol/L    Potassium 4.0 3.4 - 5.3 mmol/L    Carbon Dioxide (CO2) 30 (H) 22 - 29 mmol/L    Anion Gap 9 7 - 15 mmol/L    Urea Nitrogen 17.8 8.0 - 23.0 mg/dL    Creatinine 0.91 0.67 - 1.17 mg/dL    GFR Estimate 86 >60 mL/min/1.73m2      Comment:      eGFR calculated using 2021 CKD-EPI equation.    Calcium 9.2 8.8 - 10.4 mg/dL      Comment:      Reference intervals for this test were updated on 7/16/2024 to reflect our healthy population more accurately. There may be differences in the flagging of prior results with similar values performed with this method. Those prior results can be interpreted in the context of the updated reference intervals.    Chloride 102 98 - 107 mmol/L    Glucose 97 70 - 99 mg/dL    Alkaline Phosphatase 92 40 - 150 U/L    AST 22 0 - 45 U/L    ALT 19 0 - 70 U/L    Protein Total 7.2 6.4 - 8.3 g/dL    Albumin 4.2 3.5 - 5.2 g/dL    Bilirubin Total 0.6 <=1.2 mg/dL   TSH with free T4 reflex   Result Value Ref Range    TSH 2.23 0.30 - 4.20 uIU/mL   PSA, screen   Result Value Ref Range    Prostate Specific Antigen Screen 0.97 0.00 - 6.50 ng/mL    Narrative    This result is obtained using the Roche Elecsys total PSA method on the nicky [id number] immunoassay analyzer. Results obtained with different assay methods or kits cannot be used interchangeably.   ESR: Erythrocyte sedimentation rate   Result Value Ref Range    Erythrocyte Sedimentation Rate 5 0 - 20 mm/hr   CRP inflammation   Result Value Ref Range    CRP Inflammation <3.00 <5.00 mg/L   HIV Antigen Antibody Combo   Result Value Ref Range    HIV Antigen Antibody  Combo Nonreactive Nonreactive      Comment:      Negative HIV-1 p24 antigen and HIV-1/2 antibody screening test results usually indicate the absence of HIV-1 and HIV-2 infection. However, such negative results do not rule-out acute HIV infection.  If acute HIV-1 or HIV-2 infection is suspected, detection of HIV-1 or HIV-2 RNA  is recommended.    CBC with platelets and differential   Result Value Ref Range    WBC Count 5.5 4.0 - 11.0 10e3/uL    RBC Count 4.90 4.40 - 5.90 10e6/uL    Hemoglobin 14.6 13.3 - 17.7 g/dL    Hematocrit 45.0 40.0 - 53.0 %    MCV 92 78 - 100 fL    MCH 29.8 26.5 - 33.0 pg    MCHC 32.4 31.5 - 36.5 g/dL    RDW 13.3 10.0 - 15.0 %    Platelet Count 222 150 - 450 10e3/uL    % Neutrophils 69 %    % Lymphocytes 15 %    % Monocytes 12 %    % Eosinophils 3 %    % Basophils 0 %    % Immature Granulocytes 1 %    Absolute Neutrophils 3.8 1.6 - 8.3 10e3/uL    Absolute Lymphocytes 0.9 0.8 - 5.3 10e3/uL    Absolute Monocytes 0.7 0.0 - 1.3 10e3/uL    Absolute Eosinophils 0.2 0.0 - 0.7 10e3/uL    Absolute Basophils 0.0 0.0 - 0.2 10e3/uL    Absolute Immature Granulocytes 0.0 <=0.4 10e3/uL       If you have any questions or concerns, please call the clinic at the number listed above.       Sincerely,      Jason Kelley MD

## 2024-08-14 LAB — NONINV COLON CA DNA+OCC BLD SCRN STL QL: NEGATIVE

## 2024-08-26 ENCOUNTER — HOSPITAL ENCOUNTER (OUTPATIENT)
Dept: CT IMAGING | Facility: CLINIC | Age: 79
Discharge: HOME | End: 2024-08-26
Attending: FAMILY MEDICINE | Admitting: FAMILY MEDICINE
Payer: MEDICARE

## 2024-08-26 DIAGNOSIS — R63.4 WEIGHT LOSS: ICD-10-CM

## 2024-08-26 PROCEDURE — 250N000011 HC RX IP 250 OP 636: Performed by: FAMILY MEDICINE

## 2024-08-26 PROCEDURE — 71260 CT THORAX DX C+: CPT | Mod: MG

## 2024-08-26 RX ORDER — IOPAMIDOL 755 MG/ML
90 INJECTION, SOLUTION INTRAVASCULAR ONCE
Status: COMPLETED | OUTPATIENT
Start: 2024-08-26 | End: 2024-08-26

## 2024-08-26 RX ADMIN — IOPAMIDOL 90 ML: 755 INJECTION, SOLUTION INTRAVENOUS at 12:30

## 2024-09-03 ENCOUNTER — TELEPHONE (OUTPATIENT)
Dept: FAMILY MEDICINE | Facility: CLINIC | Age: 79
End: 2024-09-03

## 2024-09-03 ENCOUNTER — OFFICE VISIT (OUTPATIENT)
Dept: FAMILY MEDICINE | Facility: CLINIC | Age: 79
End: 2024-09-03
Payer: MEDICARE

## 2024-09-03 VITALS
RESPIRATION RATE: 16 BRPM | TEMPERATURE: 98.1 F | OXYGEN SATURATION: 98 % | SYSTOLIC BLOOD PRESSURE: 134 MMHG | HEIGHT: 69 IN | WEIGHT: 147.3 LBS | BODY MASS INDEX: 21.82 KG/M2 | HEART RATE: 62 BPM | DIASTOLIC BLOOD PRESSURE: 74 MMHG

## 2024-09-03 DIAGNOSIS — R63.4 WEIGHT LOSS: Primary | ICD-10-CM

## 2024-09-03 PROCEDURE — 99213 OFFICE O/P EST LOW 20 MIN: CPT | Performed by: FAMILY MEDICINE

## 2024-09-03 RX ORDER — TIMOLOL MALEATE 5 MG/ML
1 SOLUTION/ DROPS OPHTHALMIC DAILY
Qty: 5 ML | Refills: 0 | Status: CANCELLED | OUTPATIENT
Start: 2024-09-03

## 2024-09-03 NOTE — PROGRESS NOTES
Assessment & Plan     Weight loss  Weight loss which seem to be stable at this point.  Given the findings on his CAT scan we will get upper endoscopy and upper quadrant ultrasound.  Because of his early satiety we will have him hold his statin and his aspirin for now.  Follow-up after studies are done  - Adult GI  Referral - Procedure Only; Future  - US Abdomen Limited; Future                Subjective   Gaurang is a 79 year old, presenting for the following health issues: patient is here for follow up on weight, has had labs and imaging done, looking for next steps  He notes that for the last 3 months or so when he sits down to eat he feels very hungry but that within a few bites he is very full.  It happens with every meal.  He does note that he quit drinking for 3 months at the beginning of this year and then he started with just 1 or 2 beers a week.  Weight Check and Follow Up        9/3/2024    10:06 AM   Additional Questions   Roomed by sergio adorno   Accompanied by self     History of Present Illness       Reason for visit:  Follow up on weight loss    He eats 2-3 servings of fruits and vegetables daily.He consumes 0 sweetened beverage(s) daily.He exercises with enough effort to increase his heart rate 10 to 19 minutes per day.  He exercises with enough effort to increase his heart rate 4 days per week.   He is taking medications regularly.     Hyperlipidemia Follow-Up    Are you regularly taking any medication or supplement to lower your cholesterol?   Yes- Pravastsatin  Are you having muscle aches or other side effects that you think could be caused by your cholesterol lowering medication?  No    Hypertension Follow-up    Do you check your blood pressure regularly outside of the clinic? No   Are you following a low salt diet? No  Are your blood pressures ever more than 140 on the top number (systolic) OR more   than 90 on the bottom number (diastolic), for example 140/90? No        Review of  "Systems  Constitutional, HEENT, cardiovascular, pulmonary, gi and gu systems are negative, except as otherwise noted.      Objective    /74 (BP Location: Right arm, Patient Position: Sitting)   Pulse 62   Temp 98.1  F (36.7  C)   Resp 16   Ht 1.753 m (5' 9\")   Wt 66.8 kg (147 lb 4.8 oz)   SpO2 98%   BMI 21.75 kg/m    Body mass index is 21.75 kg/m .  Physical Exam   Patient alert in no distress reviewed his weights            Signed Electronically by: Jason Kelley MD    "

## 2024-09-03 NOTE — TELEPHONE ENCOUNTER
Faxed order for Adult GI - Procedure Only (EGD) to [facility] at [phone]. Confirmation was received.    Faxed order for US Abdomen (RUQ) to [facility] at [phone]. Confirmation was received.

## 2024-09-03 NOTE — LETTER
September 6, 2024      Gaurang Saeed  [address] [place] [id number]        Dear JhonnyChristophe,    We are writing to inform you of your test results.    Your ultrasound of your abdomen is normal    If you have any questions or concerns, please call the clinic at the number listed above.       Sincerely,      Jason Kelley MD on 9/6/2024 at 6:54 AM

## 2024-09-04 ENCOUNTER — TELEPHONE (OUTPATIENT)
Dept: FAMILY MEDICINE | Facility: CLINIC | Age: 79
End: 2024-09-04
Payer: MEDICARE

## 2024-09-04 ENCOUNTER — MYC MEDICAL ADVICE (OUTPATIENT)
Dept: FAMILY MEDICINE | Facility: CLINIC | Age: 79
End: 2024-09-04
Payer: MEDICARE

## 2024-09-04 NOTE — TELEPHONE ENCOUNTER
Order/Referral Request    Who is requesting: Gaurang     There was an Ultrasound ordered, that was done at [facility], but also scheduled for [facility] (he will cancel that), however, the US order was canceled, and a CT order was entered. He called [facility] at the number given, they had no record of the referral.         When are orders needed by: Soon, more for clarification of what is needed at this time.    Has this been discussed with Provider: Yes    Does patient have a preference on a Group/Provider/Facility? [facility] for the GI consult, and CT in house if that is still needed is okay.        Where to send orders: Place orders within Epic and or fax where appropriate.    Could we send this information to you in [employer] or would you prefer to receive a phone call?:   Patient would like to be contacted via [employer]

## 2024-10-18 ENCOUNTER — IMMUNIZATION (OUTPATIENT)
Dept: FAMILY MEDICINE | Facility: CLINIC | Age: 79
End: 2024-10-18
Payer: MEDICARE

## 2024-10-18 DIAGNOSIS — Z23 ENCOUNTER FOR IMMUNIZATION: Primary | ICD-10-CM

## 2024-10-18 PROCEDURE — 99207 PR NO CHARGE NURSE ONLY: CPT

## 2024-10-18 PROCEDURE — 90480 ADMN SARSCOV2 VAC 1/ONLY CMP: CPT

## 2024-10-18 PROCEDURE — 91320 SARSCV2 VAC 30MCG TRS-SUC IM: CPT

## 2024-10-18 NOTE — PROGRESS NOTES
Prior to immunization administration, verified patients identity using patient s name and date of birth. Please see Immunization Activity for additional information.     Is the patient's temperature normal (100.5 or less)? Yes     Patient MEETS CRITERIA. PROCEED with vaccine administration.          10/18/2024   COVID   Have you had myocarditis or pericarditis (inflammation of or around the heart muscle) after getting a COVID-19 vaccine? No   Have you had a serious reaction to a COVID vaccine or something in a COVID vaccine, like polyethylene glycol (PEG) or polysorbate? No   Have you had multisystem inflammatory syndrome from COVID-19 in the past 90 days? No   Have you received a bone marrow transplant within the previous 3 months? No            Patient MEETS CRITERIA. PROCEED with vaccine administration.        Patient instructed to remain in clinic for 15 minutes afterwards, and to report any adverse reactions.      Link to Ancillary Visit Immunization Standing Orders SmartSet     Screening performed by Nicole Llanos LPN on 10/18/2024 at 10:12 AM.

## 2024-11-08 ENCOUNTER — TRANSFERRED RECORDS (OUTPATIENT)
Dept: HEALTH INFORMATION MANAGEMENT | Facility: CLINIC | Age: 79
End: 2024-11-08
Payer: MEDICARE

## 2024-11-15 ENCOUNTER — OFFICE VISIT (OUTPATIENT)
Dept: FAMILY MEDICINE | Facility: CLINIC | Age: 79
End: 2024-11-15
Payer: MEDICARE

## 2024-11-15 VITALS
RESPIRATION RATE: 16 BRPM | BODY MASS INDEX: 22.31 KG/M2 | HEART RATE: 58 BPM | TEMPERATURE: 97.8 F | DIASTOLIC BLOOD PRESSURE: 102 MMHG | HEIGHT: 69 IN | WEIGHT: 150.6 LBS | SYSTOLIC BLOOD PRESSURE: 151 MMHG | OXYGEN SATURATION: 100 %

## 2024-11-15 DIAGNOSIS — Z29.11 NEED FOR VACCINATION AGAINST RESPIRATORY SYNCYTIAL VIRUS: ICD-10-CM

## 2024-11-15 DIAGNOSIS — E78.00 PURE HYPERCHOLESTEROLEMIA: ICD-10-CM

## 2024-11-15 DIAGNOSIS — R63.4 WEIGHT LOSS: Primary | ICD-10-CM

## 2024-11-15 DIAGNOSIS — I10 BENIGN ESSENTIAL HYPERTENSION: ICD-10-CM

## 2024-11-15 PROCEDURE — 99213 OFFICE O/P EST LOW 20 MIN: CPT | Performed by: FAMILY MEDICINE

## 2024-11-15 RX ORDER — LISINOPRIL 40 MG/1
TABLET ORAL
Qty: 90 TABLET | Refills: 3 | Status: SHIPPED | OUTPATIENT
Start: 2024-11-15

## 2024-11-15 RX ORDER — PRAVASTATIN SODIUM 20 MG
TABLET ORAL
Qty: 90 TABLET | Refills: 3 | Status: CANCELLED | OUTPATIENT
Start: 2024-11-15

## 2024-11-15 RX ORDER — HYDROCHLOROTHIAZIDE 12.5 MG/1
1 CAPSULE ORAL DAILY
Qty: 90 CAPSULE | Refills: 3 | Status: SHIPPED | OUTPATIENT
Start: 2024-11-15

## 2024-11-15 RX ORDER — AMLODIPINE BESYLATE 5 MG/1
5 TABLET ORAL DAILY
Qty: 90 TABLET | Refills: 3 | Status: SHIPPED | OUTPATIENT
Start: 2024-11-15

## 2024-11-15 NOTE — PROGRESS NOTES
Assessment & Plan     Weight loss  Resolved.  Recent EGD did show some lower esophageal inflammation as well as a gastropathy.  He feels much better off the statin and aspirin and will continue to be off those for now.  He has a repeat EGD in 2 months after 2 months on the Prilosec.  He does note in the evenings he is got a slight cough since starting the Prilosec.  Consider whether or not the lisinopril may be the culprit we will track.  Blood pressure is up today because he has not taken his morning meds.  He will track his blood pressures at home    Pure hypercholesterolemia      Benign essential hypertension    - lisinopril (ZESTRIL) 40 MG tablet; TAKE ONE TABLET (40 MG) BY MOUTH DAILY  - hydrochlorothiazide (MICROZIDE) 12.5 MG capsule; Take 1 capsule (12.5 mg) by mouth daily.  - amLODIPine (NORVASC) 5 MG tablet; Take 1 tablet (5 mg) by mouth daily.    Need for vaccination against respiratory syncytial virus                  oLnnie Merlos is a 79 year old, presenting for the following health issues: Feels much better.  He is now on Prilosec after his EGD.  Continues to follow with GI.  Will have another EGD in 2 months.  Weight Loss (Follow up for ongoing weight loss.)      11/15/2024     7:10 AM   Additional Questions   Roomed by Thu COMER   Accompanied by Self     History of Present Illness       Reason for visit:  Weight loss    He eats 2-3 servings of fruits and vegetables daily.He consumes 0 sweetened beverage(s) daily.He exercises with enough effort to increase his heart rate 10 to 19 minutes per day.  He exercises with enough effort to increase his heart rate 4 days per week.   He is taking medications regularly.                 Review of Systems  Constitutional, HEENT, cardiovascular, pulmonary, gi and gu systems are negative, except as otherwise noted.      Objective    BP (!) 151/102 (BP Location: Right arm, Patient Position: Sitting, Cuff Size: Adult Large)   Pulse 58   Temp 97.8  F (36.6  C)   " Resp 16   Ht 1.753 m (5' 9\")   Wt 68.3 kg (150 lb 9.6 oz)   SpO2 100%   BMI 22.24 kg/m    Body mass index is 22.24 kg/m .  Physical Exam   Alert no apparent distress lungs clear heart regular rate rhythm            Signed Electronically by: Jason Kelley MD    "

## 2024-11-19 ENCOUNTER — OFFICE VISIT (OUTPATIENT)
Dept: FAMILY MEDICINE | Facility: CLINIC | Age: 79
End: 2024-11-19
Payer: MEDICARE

## 2024-11-19 VITALS
OXYGEN SATURATION: 97 % | DIASTOLIC BLOOD PRESSURE: 75 MMHG | HEART RATE: 71 BPM | TEMPERATURE: 97.1 F | WEIGHT: 151.2 LBS | HEIGHT: 69 IN | SYSTOLIC BLOOD PRESSURE: 116 MMHG | BODY MASS INDEX: 22.39 KG/M2 | RESPIRATION RATE: 14 BRPM

## 2024-11-19 DIAGNOSIS — G89.29 CHRONIC ELBOW PAIN, LEFT: Primary | ICD-10-CM

## 2024-11-19 DIAGNOSIS — M25.522 CHRONIC ELBOW PAIN, LEFT: Primary | ICD-10-CM

## 2024-11-19 DIAGNOSIS — Z76.89 ENCOUNTER TO ESTABLISH CARE WITH NEW DOCTOR: ICD-10-CM

## 2024-11-19 PROCEDURE — 99213 OFFICE O/P EST LOW 20 MIN: CPT | Performed by: STUDENT IN AN ORGANIZED HEALTH CARE EDUCATION/TRAINING PROGRAM

## 2024-11-19 RX ORDER — OMEPRAZOLE 20 MG/1
20 TABLET, DELAYED RELEASE ORAL DAILY
COMMUNITY

## 2024-11-19 NOTE — PATIENT INSTRUCTIONS
Very nice to meet you!    Referral placed for PT. Call [phone] if you do not hear from physical therapy team within 2 days.    Try acetaminophen (Tylenol) during your course of PT - take 500 mg two to three times per day, for at least 10-14 days, to see if you have improvement

## 2024-11-19 NOTE — PROGRESS NOTES
"  Assessment & Plan     Chronic elbow pain, left  Suspect lateral epicondylitis but may be a medial issue, not clear from exam.  No concern for tunnel issue or posterior elbow pathology in light of pain pattern and lack of paresthesias.  Recommend PT in light of length of issue.  He is in agreement.  - Physical Therapy  Referral; Future  -Recommend scheduled acetaminophen for 10 to 14 days during initial course of PT.  Avoiding NSAIDs in light of gastropathy/weight loss  -Call if no improvement despite these interventions    Encounter to establish care with new doctor        Lonnie Merlos is a 79 year old, presenting for the following health issues:  Establish Care and Musculoskeletal Problem (Left elbow pain that has been going on for 6 months but has gotten worse. Pt denies any injury to it. )        11/19/2024    11:18 AM   Additional Questions   Roomed by Samantha-KWAKU     History of Present Illness       Reason for visit:  Weight loss    He eats 2-3 servings of fruits and vegetables daily.He consumes 0 sweetened beverage(s) daily.He exercises with enough effort to increase his heart rate 10 to 19 minutes per day.  He exercises with enough effort to increase his heart rate 4 days per week.   He is taking medications regularly.     Calls pain tennis elbow  Progressing over the last months  Couldn't floss a few nights ago due to pain  No pain with rest  Notes pain with weights  No consistent exercises  No radiation of pain    Doesn't recall if he's had this before        Objective    /75   Pulse 71   Temp 97.1  F (36.2  C) (Tympanic)   Resp 14   Ht 1.753 m (5' 9\")   Wt 68.6 kg (151 lb 3.2 oz)   SpO2 97%   BMI 22.33 kg/m    Body mass index is 22.33 kg/m .  Physical Exam   Left elbow: No swelling of the elbow joint or erythema, no pain with palpation of the epicondyles and olecranon.  Normal active range of motion, no pain with resisted pronation or supination, but reports central anterior " pain with resisted flexion.  No reported pain to the posterior elbow, no pain of the biceps with hook test.  Denies any radiation of pain or neuropathy down his arm          Signed Electronically by: La Clements MD

## 2024-12-02 ENCOUNTER — TRANSFERRED RECORDS (OUTPATIENT)
Dept: HEALTH INFORMATION MANAGEMENT | Facility: CLINIC | Age: 79
End: 2024-12-02
Payer: MEDICARE

## 2025-02-19 NOTE — PROGRESS NOTES
Patient:   CHANA BENITEZ            MRN: [id number]            FIN: [id number]               Age:   74 years     Sex:  Male     :  1945   Associated Diagnoses:   Anosmia   Author:   Jason Kelley MD      Visit Information      Date of Service: 2019 10:54 am  Performing Location: [facility]  Encounter#: [id number]      Primary Care Provider (PCP):  Macario Frances MD    NPI# [id number]      Referring Provider:  Jason Kelley MD    NPI# [id number]      Chief Complaint   2019 10:55 AM CDT   Had a cold which has resolved, but taste and smell have not returned. What he does smell is rancid.        History of Present Illness   Patient had a routine cold 3 months ago and since then has had loss of smell and taste.  Very frustrating.  He notes occasionally think small rancid to him but he has had no foul nasal discharge.  Has had some chronic clear nasal discharge for a long time.  No other neurologic symptoms.  No other symptoms         Review of Systems   Constitutional:  Negative except as documented in history of present illness.    Eye:  Negative.    Ear/Nose/Mouth/Throat:  Negative except as documented in history of present illness.    Respiratory:  Negative.    Cardiovascular:  Negative.    Gastrointestinal:  Negative.    Musculoskeletal:  Negative.    Integumentary:  Negative.    Neurologic      Health Status   Allergies:    Nonallergic Reactions (Selected)  Severity Not Documented  HYDROcodone (Nausea and nausea & vomiting.)  OxyCODONE (Nausea)   Medications:  (Selected)   Prescriptions  Prescribed  Flonase 50 mcg/inh nasal spray: = 2 spray(s), Nasal, daily, # 1 EA, 6 Refill(s), Type: Maintenance, Pharmacy: [facility], 2 spray(s) Nasal daily  hydroCHLOROthiazide 12.5 mg oral capsule: = 1 cap(s) ( 12.5 mg ), po, daily, # 90 cap(s), 3 Refill(s), Type: Soft Stop, Pharmacy: [facility], 1 cap(s) Oral daily,x90 day(s)  lisinopril 40 mg oral tablet: = 1 tab(s) ( 40 mg ), po,  Cardiology Team 2 - Progress Note   Gael Ferraro 86 y.o. male MRN: [id number]  Unit/Bed#: [facility] 526-01 Encounter: [id number]    Assessment and Plan      86-year-old male with known history of obstructive coronary artery disease and known RCA  and ostial to proximal 50% left main disease based on prior cardiac catheterization. Also has history of other cardiac catheterization with PCI about 20 years ago. Patient presented with exertional angina going on for about a month with increase in symptom intensity. As a result of worsening symptoms, he initially presented to [facility]. Was loaded with Brilinta and underwent urgent cardiac catheterization and was noted to have further progression in his left main disease along with known RCA . An intra-aortic balloon pump was inserted postcardiac catheterization to help maintain appropriate coronary perfusion pressure and he was subsequently transferred to [facility] critical care unit for evaluation by cardiology and cardiac surgery. He was seen and evaluated by cardiac surgery earlier this morning and noted not to be surgery candidate based on advanced age, deconditioning, gait dysfunction. Was recommended for high risk PCI with cardiology team. Cardiology team following from this standpoint.     Past 24 hours: No events overnight.  Was given a one-time dose of IV Lasix 40 mg yesterday.  Relevant imaging from past 24 hours: No new CXR or EKG from yesterday or this morning.  Labs Vitals (past 24 hours) I/Os (past 24 hours) Weights   Recent Labs     02/18/25  0535 02/19/25  0450   CREATININE 1.28 1.37*   EGFR 50 46   CO2 24 27   K 4.0 3.4*   MG  --  2.0   SODIUM 136 135   BUN 22 27*   HGB 9.6* 10.1*    259    Temp:  [98.2 °F (36.8 °C)-99.8 °F (37.7 °C)] 98.2 °F (36.8 °C)  HR:  [] 87  BP: (104-144)/(53-72) 107/54  Resp:  [16-44] 16  SpO2:  [91 %-96 %] 92 %  O2 Device: Nasal cannula  Nasal Cannula O2 Flow Rate (L/min):  [6 L/min] 6  L/min     Intake/Output Summary (Last 24 hours) at 2/19/2025 0825  Last data filed at 2/19/2025 0811  Gross per 24 hour   Intake 860 ml   Output 1865 ml   Net -1005 ml    Weight (last 2 days)       Date/Time Weight    02/19/25 0542 68.8 (151.68)    02/18/25 0600 67.2 (148.15)    02/17/25 0600 68.3 (150.57)               Plan  -Noted to have elevated serum creatinine at 1.37 this morning, plan to hold losartan 12.5 mg for today.  Systolic BP readings around 100-1 10 so can hold off initiating further antihypertensive therapy at this time.  Once serum creatinine resolves, we will restart losartan.  -Continue on DAPT with aspirin and brilinta, high intensity statin, Brilinta price check in progress  -CXR ordered today to assess for improvement in pulmonary venous congestion  -On rate control with metoprolol tartrate 50 mg twice daily  -On Ranexa 1000 mg every 12 hours for antianginal therapy, if renal function worsens, will have to renally dose ranexa  -Continue telemetry monitoring. Monitor and correct electrolyte derangements.  -Cardiology service will continue to follow.  He will need cardiac rehab upon discharge.    Assessment & Plan  Coronary artery disease involving native coronary artery of native heart with angina pectoris (HCC)  initially presented to [facility].  Was loaded with Brilinta and underwent urgent cardiac catheterization and was noted to have further progression in his left main disease along with known RCA .  An intra-aortic balloon pump was inserted postcardiac catheterization to help maintain appropriate coronary perfusion pressure and he was subsequently transferred to [facility] critical care unit for evaluation by cardiology and cardiac surgery.  He was seen and evaluated by cardiac surgery earlier this morning and noted not to be surgery candidate based on advanced age, deconditioning, gait dysfunction.    Hypertension  Systolics in the ranges of 100-1 10, would hold  daily, # 90 tab(s), 3 Refill(s), Type: Soft Stop, Pharmacy: [facility], 1 tab(s) Oral daily,x90 day(s)  predniSONE 10 mg oral tablet: 4 tabs daily for 3 days taper bey 1 tab every 3 days, Oral, daily, # 30 tab(s), 0 Refill(s), Type: Maintenance, Pharmacy: [facility], 4 tabs daily for 3 days taper bey 1 tab every 3 days Oral daily,    Medications          *denotes recorded medication          Flonase 50 mcg/inh nasal spray: 2 spray(s), Nasal, daily, 1 EA, 6 Refill(s).          hydroCHLOROthiazide 12.5 mg oral capsule: 12.5 mg, 1 cap(s), po, daily, for 90 day(s), 90 cap(s), 3 Refill(s).          lisinopril 40 mg oral tablet: 40 mg, 1 tab(s), po, daily, for 90 day(s), 90 tab(s), 3 Refill(s).          predniSONE 10 mg oral tablet: 4 tabs daily for 3 days taper bey 1 tab every 3 days, Oral, daily, 30 tab(s), 0 Refill(s).       Problem list:    All Problems (Selected)  Agatston coronary artery calcium score less than 100 / SNOMED CT 4296187397 / Confirmed  Family history of heart disease / SNOMED CT [id number] / Confirmed  Dyslipidemia, goal LDL below 130 / SNOMED CT 67525315 / Confirmed  Hypertension / SNOMED CT 6928830617 / Confirmed  Palpitations / SNOMED CT 319652869 / Confirmed  Seasonal allergies / SNOMED CT 371506235 / Confirmed  Hearing loss in left ear / SNOMED CT 140203031 / Confirmed      Histories   Past Medical History:    Active  Hypertension (7803228440)  Dyslipidemia, goal LDL below 130 (92919117)  Comments:  4/15/2014 CDT 11:01 AM Macario Callejas MD  10 year risk 14.8%. Moderate to high intensity statin indicated.    4/6/2017 CDT 2:49 PM Macario Callejas MD  10 year estimated risk 21%    4/26/2018 CDT 10:27 AM Macario Callejas MD  10 year estimated risk 17.5% improved to 14.1% with optimal care  Agatston coronary artery calcium score less than 100 (4825320184)  Comments:  10/16/2015 CDT 8:45 AM CDT - Macario Frances MD  62  Hearing loss in left ear  (443420961)  Seasonal allergies (516167501)  Palpitations (052391773)   Family History:    Hypertension  Mother (): onset at 60 .  Hypothyroidism  Mother ()  Daughter (Aisha)  Myocardial infarction  Father ()  Tobacco abuse  Father ()  MI - Myocardial infarction  Father ()  Comments:  2013 5:04 PM CST - Petty Raymond  First MI late 40s.     Procedure history:    Cardiac computed tomography for calcium scoring (SNOMED CT 7680442401) on 3/25/2014 at 68 Years.  Comments:  2017 3:44 PM CDT - Marya Morrison  Total Agatston calcium score is 62  Colonoscopy (SNOMED CT 856575471) on 2011 at 66 Years.  Comments:  2011 12:58 PM CDT - Macario Frances MD  Negative. Repeat in 10 years.  Knee Arthrosopy in  at 62 Years.  Colonoscopy (SNOMED CT 914990017) on 3/15/1996 at 50 Years.  Comments:  3/31/2011 9:45 AM CDT - Macario Frances MD  normal  Rectal biopsy (SNOMED CT 967165122) on 3/15/1996 at 50 Years.  Appendectomy (SNOMED CT 774951021) in  at 15 Years.   Social History:        Alcohol Assessment: Current            Current, Daily, 2 drinks/episode average.  2 drinks/episode maximum.      Tobacco Assessment: Denies Tobacco Use            Never smoker      Substance Abuse Assessment: Denies Substance Abuse      Employment and Education Assessment            Employed, Work/School description: .      Home and Environment Assessment            Marital status: .  Spouse/Partner name: Alona Simons.  Living situation: Home/Independent.      Exercise and Physical Activity Assessment: Regular exercise        Physical Examination   Vital Signs   2019 10:55 AM CDT Temperature Tympanic 97.6 DegF  LOW    Peripheral Pulse Rate 61 bpm    HR Method Electronic    Systolic Blood Pressure 168 mmHg  HI    Diastolic Blood Pressure 95 mmHg  HI    Mean Arterial Pressure 119 mmHg    BP Method Electronic    Vital Signs Comments Currently on OTC allergy meds.       off on addition of other antihypertensives or GDMT at this point.  CKD (chronic kidney disease) stage 3, GFR 30-59 ml/min (HCC)  Lab Results   Component Value Date    EGFR 46 02/19/2025    EGFR 50 02/18/2025    EGFR 52 02/17/2025    CREATININE 1.37 (H) 02/19/2025    CREATININE 1.28 02/18/2025    CREATININE 1.24 02/17/2025   Appears to be at baseline renal function, will need to monitor at this since patient will likely require another cardiac catheterization early next week.   Pure red cell aplasia (HCC)  Has been previously noted to have this, continue to monitor hemoglobin   Hypercholesterolemia  On high intensity statin   Ambulatory dysfunction  Will likely require rehab/PT evaluation    Case discussed and reviewed, Please wait for final recommendations from Dr. Nayak. Thank you for involving us in the care of this patient.    Subjective     Patient seen and examined.      Objective     Physical Exam  Vitals reviewed.   Constitutional:       Comments: On NC   Cardiovascular:      Rate and Rhythm: Normal rate and regular rhythm.      Pulses: Normal pulses.      Heart sounds: Normal heart sounds.      Comments: No edema in lower extremities  Pulmonary:      Effort: Pulmonary effort is normal.      Breath sounds: Normal breath sounds.   Skin:     General: Skin is warm.      Capillary Refill: Capillary refill takes less than 2 seconds.   Neurological:      Mental Status: He is alert.         Vitals:  Temp:  [98.2 °F (36.8 °C)-99.8 °F (37.7 °C)] 98.2 °F (36.8 °C)  HR:  [] 87  BP: (104-144)/(53-72) 107/54  Resp:  [16-44] 16  SpO2:  [91 %-96 %] 92 %  O2 Device: Nasal cannula  Nasal Cannula O2 Flow Rate (L/min):  [6 L/min] 6 L/min  Orthostatic Blood Pressures      Flowsheet Row Most Recent Value   Blood Pressure 107/54 filed at 02/19/2025 0408   Patient Position - Orthostatic VS Lying filed at 02/19/2025 0710            Cardiac Devices, Imaging & Monitoring     Telemetry:   Personally reviewed by Vani Orr,  MD: Sinus rhythm on telemetry    Results for orders placed during the hospital encounter of 02/15/25    Echo complete w/ contrast if indicated    Interpretation Summary    Left Ventricle: Left ventricular cavity size is normal. Wall thickness is mildly increased. There is concentric remodeling. The left ventricular ejection fraction is 43% by biplane measurement. Systolic function is mildly reduced. There is mild global hypokinesis. Diastolic function is moderately abnormal, consistent with grade II (pseudonormal) relaxation.    The following segments are akinetic: apical anterior, apical septal and apex.    The following segments are mildly hypokinetic: basal anterior, basal anteroseptal, basal inferoseptal, basal inferior, basal inferolateral, basal anterolateral, mid anterior, mid anteroseptal, mid inferoseptal, mid inferior, mid inferolateral, mid anterolateral, apical inferior and apical lateral.    Mitral Valve: There is mild regurgitation with a posteriorly directed jet.    Prior TTE study available for comparison. Prior study date: 1/1/2024. Changes noted when compared to prior study. Changes include: LVEF now mildly reduced, with regional wall motion abnormalities. .    Vani Orr MD  Cardiovascular Disease Fellow, FY-1   Measurements from flowsheet : Measurements   9/11/2019 10:55 AM CDT   Weight Measured - Standard                159.8 lb     General:  Alert and oriented, No acute distress.    Eye:  Pupils are equal, round and reactive to light, Extraocular movements are intact.    HENT:  Tympanic membranes are clear, No pharyngeal erythema.         Nose: Within normal limits.    Neck:  Supple, Non-tender.    Neurologic:  Alert, Oriented, Cranial Nerves II-XII are grossly intact.       Impression and Plan   Diagnosis     Anosmia ([id number] R43.0).     Course:  Not progressing as expected.    Plan:  Patient with anosmia following viral infection about 3 months ago.  No obvious signs of a deep sinus infection we will trial on a burst and taper prednisone along with Flonase for the next 3 months.  If he sees no improvement over the next few weeks we will look at MRI of his head and sinuses.  May look at ENT referral also discussed his blood pressure  .    Patient Instructions:       Counseled: Patient, Regarding diagnosis, Regarding treatment, Regarding medications.

## 2025-03-18 ENCOUNTER — OFFICE VISIT (OUTPATIENT)
Dept: FAMILY MEDICINE | Facility: CLINIC | Age: 80
End: 2025-03-18
Payer: MEDICARE

## 2025-03-18 VITALS
HEART RATE: 65 BPM | TEMPERATURE: 97.3 F | RESPIRATION RATE: 16 BRPM | DIASTOLIC BLOOD PRESSURE: 89 MMHG | WEIGHT: 154.6 LBS | OXYGEN SATURATION: 98 % | SYSTOLIC BLOOD PRESSURE: 135 MMHG | BODY MASS INDEX: 22.83 KG/M2

## 2025-03-18 DIAGNOSIS — F68.8 PERSONALITY CHANGE: ICD-10-CM

## 2025-03-18 DIAGNOSIS — S06.9X1A MILD TRAUMATIC BRAIN INJURY, WITH LOSS OF CONSCIOUSNESS OF 30 MINUTES OR LESS, INITIAL ENCOUNTER (H): Primary | ICD-10-CM
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
## 2025-03-18 DIAGNOSIS — R45.4 IRRITABILITY: ICD-10-CM

## 2025-03-18 PROCEDURE — 99213 OFFICE O/P EST LOW 20 MIN: CPT | Performed by: STUDENT IN AN ORGANIZED HEALTH CARE EDUCATION/TRAINING PROGRAM

## 2025-03-18 PROCEDURE — 3079F DIAST BP 80-89 MM HG: CPT | Performed by: STUDENT IN AN ORGANIZED HEALTH CARE EDUCATION/TRAINING PROGRAM

## 2025-03-18 PROCEDURE — 3075F SYST BP GE 130 - 139MM HG: CPT | Performed by: STUDENT IN AN ORGANIZED HEALTH CARE EDUCATION/TRAINING PROGRAM

## 2025-03-18 NOTE — PATIENT INSTRUCTIONS
Hospital number: [phone]     Let me know if not able to schedulee this week, will send the order elsewhere.
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
## 2025-03-19 ENCOUNTER — TELEPHONE (OUTPATIENT)
Dept: FAMILY MEDICINE | Facility: CLINIC | Age: 80
End: 2025-03-19
Payer: MEDICARE

## 2025-03-19 NOTE — TELEPHONE ENCOUNTER
S06.9X1A (ICD-10-CM) - Mild traumatic brain injury, with loss of consciousness of 30 minutes or less, initial encounter (H)   W19.XXXA (ICD-10-CM) - Falls, initial encounter     [facility] calling, they are missing page 2 of ultrasound orders.  Please fax:  [phone].

## 2025-04-12 ENCOUNTER — HEALTH MAINTENANCE LETTER (OUTPATIENT)
Age: 80
End: 2025-04-12

## 2025-04-15 ENCOUNTER — TELEPHONE (OUTPATIENT)
Dept: FAMILY MEDICINE | Facility: CLINIC | Age: 80
End: 2025-04-15
Payer: MEDICARE

## 2025-04-15 NOTE — TELEPHONE ENCOUNTER
Patient Quality Outreach    Patient is due for the following:   Physical Annual Wellness Visit    Action(s) Taken:   Patient has upcoming appointment, these items will be addressed at that time.    Type of outreach:    Chart review performed, no outreach needed.    Questions for provider review:    None         Thu Anne MA  Chart routed to None.

## 2025-05-03 ENCOUNTER — OFFICE VISIT (OUTPATIENT)
Dept: URGENT CARE | Facility: URGENT CARE | Age: 80
End: 2025-05-03
Payer: MEDICARE

## 2025-05-03 VITALS
OXYGEN SATURATION: 99 % | DIASTOLIC BLOOD PRESSURE: 79 MMHG | HEART RATE: 61 BPM | SYSTOLIC BLOOD PRESSURE: 149 MMHG | WEIGHT: 157 LBS | BODY MASS INDEX: 23.18 KG/M2 | TEMPERATURE: 97.7 F | RESPIRATION RATE: 16 BRPM
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
## 2025-05-03 DIAGNOSIS — S20.162A: Primary | ICD-10-CM

## 2025-05-03 PROCEDURE — 3077F SYST BP >= 140 MM HG: CPT | Performed by: PHYSICIAN ASSISTANT

## 2025-05-03 PROCEDURE — 99213 OFFICE O/P EST LOW 20 MIN: CPT | Performed by: PHYSICIAN ASSISTANT

## 2025-05-03 PROCEDURE — 3078F DIAST BP <80 MM HG: CPT | Performed by: PHYSICIAN ASSISTANT

## 2025-05-03 RX ORDER — DOXYCYCLINE 100 MG/1
200 CAPSULE ORAL ONCE
Qty: 2 CAPSULE | Refills: 0 | Status: SHIPPED | OUTPATIENT
Start: 2025-05-03 | End: 2025-05-03

## 2025-05-03 NOTE — PROGRESS NOTES
Urgent Care Clinic Visit    Chief Complaint   Patient presents with    Tick Bite     Embedded tick on left side chest. Along with red itchy spots on right side abdomen. Noticed last night              5/3/2025     9:07 AM   Additional Questions   Roomed by leti aguilar   Accompanied by self

## 2025-05-03 NOTE — PROGRESS NOTES
Patient presents with:  Tick Bite: Embedded tick on left side chest. Along with red itchy spots on right side abdomen. Noticed last night      Clinical Decision Making:  Remaining parts of the tick were removed under loupe magnification and with the 11 blade and splinter forceps.  Wound was nonreactive.  Patient is treated with single dose of doxycycline for Lyme disease prophylaxis.  Will monitor for stage I Lyme disease symptoms over next 3 to 30 days.  Will return to the walk-in care or primary care provider for testing and treatment if stage I Lyme disease symptoms should present in that timeframe.  Expected resolution and indication for return was gone over and questions were answered to patient satisfaction before discharge.      ICD-10-CM    1. Tick bite of left male breast, initial encounter  S20.162A doxycycline hyclate (VIBRAMYCIN) 100 MG capsule    W57.XXXA           Patient Instructions   Take the oral antibiotic as written    Keep the area clean dry and protected do not apply topical antibiotic    Monitor for stage I Lyme disease symptoms over next 3 to 30 days    If stage I Lyme disease symptoms should present return to the clinic for reevaluation and treatment        HPI:  Gaurang Saeed is a 79 year old male who presents today for 1 day history of a small deer tick embedded in the left anterior chest wall.  Patient identified the tick and tried to remove it himself he was not able to remove the entire tick and is now presenting today ostensibly for removal of the tick and for evaluation of possible Lyme disease.  He does not have any stage I Lyme disease symptoms to present right now.  He has had the tick on him for less than 24 hours according to his history.    History obtained from chart review and the patient.    Problem List:  2023-10: Elevated coronary artery calcium score  2023-07: Statin intolerance  2023-07: Allergic reaction to bee sting  2023-03: Glaucoma suspect, bilateral  2022-06:  Seasonal allergic rhinitis  2022-06: Palpitations  2022-06: History of parasitic disease  2022-06: Hearing loss in left ear  2020-07: Hyperlipidemia  2020-07: Benign prostatic hyperplasia without urinary obstruction  2017-03: [place]'s disease  2017-02: Bacterial infection of skin  2016-07: Benign essential hypertension  2013-01: BCC (basal cell carcinoma), face  2012-12: Neoplasm of uncertain behavior of skin      Past Medical History:   Diagnosis Date    Basal cell carcinoma     Squamous cell carcinoma        Social History     Tobacco Use    Smoking status: Never     Passive exposure: Never    Smokeless tobacco: Never   Substance Use Topics    Alcohol use: Yes       Review of Systems  As above in HPI otherwise negative.    Vitals:    05/03/25 0910   BP: (!) 149/79   BP Location: Right arm   Patient Position: Sitting   Pulse: 61   Resp: 16   Temp: 97.7  F (36.5  C)   TempSrc: Tympanic   SpO2: 99%   Weight: 71.2 kg (157 lb)       General: Patient is resting comfortably no acute distress is afebrile  HEENT: Head is normocephalic atraumatic   eyes are PERRL EOMI sclera anicteric   Skin: With small punctate tick bite site under loupe magnification there is a small amount of tick mouthparts that are remaining.  Using an 11 blade and splinter forceps the remaining mouthparts were removed.  Band-Aid was applied to the area.    Physical Exam      At the end of the encounter, I discussed results, diagnosis, medications. Discussed red flags for immediate return to clinic/ER, as well as indications for follow up if no improvement. Patient understood and agreed to plan. Patient was stable for discharge.

## 2025-05-03 NOTE — PATIENT INSTRUCTIONS
Take the oral antibiotic as written    Keep the area clean dry and protected do not apply topical antibiotic    Monitor for stage I Lyme disease symptoms over next 3 to 30 days    If stage I Lyme disease symptoms should present return to the clinic for reevaluation and treatment      
Current diet order meets estimated nutrient requirements

## 2025-05-27 ENCOUNTER — LAB (OUTPATIENT)
Dept: LAB | Facility: CLINIC | Age: 80
End: 2025-05-27
Payer: MEDICARE

## 2025-05-27 ENCOUNTER — RESULTS FOLLOW-UP (OUTPATIENT)
Dept: FAMILY MEDICINE | Facility: CLINIC | Age: 80
End: 2025-05-27

## 2025-05-27 DIAGNOSIS — Z13.6 SCREENING FOR CARDIOVASCULAR CONDITION: Primary | ICD-10-CM

## 2025-05-27 DIAGNOSIS — I10 BENIGN ESSENTIAL HYPERTENSION: Primary | ICD-10-CM

## 2025-05-27 DIAGNOSIS — I10 BENIGN ESSENTIAL HYPERTENSION: ICD-10-CM

## 2025-05-27 DIAGNOSIS — R93.1 ELEVATED CORONARY ARTERY CALCIUM SCORE: ICD-10-CM

## 2025-05-27 LAB
ANION GAP SERPL CALCULATED.3IONS-SCNC: 8 MMOL/L (ref 7–15)
BUN SERPL-MCNC: 19.9 MG/DL (ref 8–23)
CALCIUM SERPL-MCNC: 9.3 MG/DL (ref 8.8–10.4)
CHLORIDE SERPL-SCNC: 104 MMOL/L (ref 98–107)
CHOLEST SERPL-MCNC: 223 MG/DL
CREAT SERPL-MCNC: 0.84 MG/DL (ref 0.67–1.17)
EGFRCR SERPLBLD CKD-EPI 2021: 88 ML/MIN/1.73M2
FASTING STATUS PATIENT QL REPORTED: YES
FASTING STATUS PATIENT QL REPORTED: YES
GLUCOSE SERPL-MCNC: 97 MG/DL (ref 70–99)
HCO3 SERPL-SCNC: 28 MMOL/L (ref 22–29)
HDLC SERPL-MCNC: 74 MG/DL
LDLC SERPL CALC-MCNC: 135 MG/DL
NONHDLC SERPL-MCNC: 149 MG/DL
POTASSIUM SERPL-SCNC: 4.3 MMOL/L (ref 3.4–5.3)
SODIUM SERPL-SCNC: 140 MMOL/L (ref 135–145)
TRIGL SERPL-MCNC: 69 MG/DL

## 2025-05-27 PROCEDURE — 80061 LIPID PANEL: CPT | Performed by: STUDENT IN AN ORGANIZED HEALTH CARE EDUCATION/TRAINING PROGRAM

## 2025-05-27 PROCEDURE — 36415 COLL VENOUS BLD VENIPUNCTURE: CPT

## 2025-05-27 PROCEDURE — 82947 ASSAY GLUCOSE BLOOD QUANT: CPT | Performed by: STUDENT IN AN ORGANIZED HEALTH CARE EDUCATION/TRAINING PROGRAM

## 2025-05-28 ENCOUNTER — OFFICE VISIT (OUTPATIENT)
Dept: FAMILY MEDICINE | Facility: CLINIC | Age: 80
End: 2025-05-28
Payer: MEDICARE

## 2025-05-28 VITALS
HEIGHT: 69 IN | DIASTOLIC BLOOD PRESSURE: 84 MMHG | OXYGEN SATURATION: 99 % | TEMPERATURE: 96.3 F | WEIGHT: 154.2 LBS | RESPIRATION RATE: 16 BRPM | BODY MASS INDEX: 22.84 KG/M2 | HEART RATE: 66 BPM | SYSTOLIC BLOOD PRESSURE: 120 MMHG

## 2025-05-28 DIAGNOSIS — H61.23 BILATERAL IMPACTED CERUMEN: ICD-10-CM

## 2025-05-28 DIAGNOSIS — E78.2 MIXED HYPERLIPIDEMIA: ICD-10-CM

## 2025-05-28 DIAGNOSIS — Z00.00 ENCOUNTER FOR MEDICARE ANNUAL WELLNESS EXAM: Primary | ICD-10-CM

## 2025-05-28 DIAGNOSIS — Z78.9 STATIN INTOLERANCE: ICD-10-CM

## 2025-05-28 PROBLEM — R00.2 PALPITATIONS: Status: RESOLVED | Noted: 2022-06-27 | Resolved: 2025-05-28

## 2025-05-28 PROCEDURE — 3074F SYST BP LT 130 MM HG: CPT | Performed by: STUDENT IN AN ORGANIZED HEALTH CARE EDUCATION/TRAINING PROGRAM

## 2025-05-28 PROCEDURE — 3079F DIAST BP 80-89 MM HG: CPT | Performed by: STUDENT IN AN ORGANIZED HEALTH CARE EDUCATION/TRAINING PROGRAM

## 2025-05-28 PROCEDURE — G0439 PPPS, SUBSEQ VISIT: HCPCS | Performed by: STUDENT IN AN ORGANIZED HEALTH CARE EDUCATION/TRAINING PROGRAM

## 2025-05-28 PROCEDURE — 99214 OFFICE O/P EST MOD 30 MIN: CPT | Mod: 25 | Performed by: STUDENT IN AN ORGANIZED HEALTH CARE EDUCATION/TRAINING PROGRAM

## 2025-05-28 SDOH — HEALTH STABILITY: PHYSICAL HEALTH: ON AVERAGE, HOW MANY DAYS PER WEEK DO YOU ENGAGE IN MODERATE TO STRENUOUS EXERCISE (LIKE A BRISK WALK)?: 4 DAYS

## 2025-05-28 SDOH — HEALTH STABILITY: PHYSICAL HEALTH: ON AVERAGE, HOW MANY MINUTES DO YOU ENGAGE IN EXERCISE AT THIS LEVEL?: 30 MIN

## 2025-05-28 ASSESSMENT — SOCIAL DETERMINANTS OF HEALTH (SDOH): HOW OFTEN DO YOU GET TOGETHER WITH FRIENDS OR RELATIVES?: MORE THAN THREE TIMES A WEEK

## 2025-05-28 NOTE — PROGRESS NOTES
Preventive Care Visit  [facility]  La Clements MD, Family Medicine  May 28, 2025      Assessment & Plan     Encounter for Medicare annual wellness exam  Health maintenance reviewed and updated    Mixed hyperlipidemia  Statin intolerance  Discussed risk of elevated cholesterol, possible benefits of medication.  Given patient has treated blood pressure and exercises regularly, his preference would be to not use medication and to monitor every 1 to 3 years as needed.      Counseling  Appropriate preventive services were addressed with this patient via screening, questionnaire, or discussion as appropriate for fall prevention, nutrition, physical activity, Tobacco-use cessation, social engagement, weight loss and cognition.  Checklist reviewing preventive services available has been given to the patient.  Reviewed patient's diet, addressing concerns and/or questions.   The patient was provided with written information regarding signs of hearing loss.           Lonnie Merlos is a 80 year old, presenting for the following:  Medicare Visit (AWV)        5/28/2025     9:37 AM   Additional Questions   Roomed by Carmen LOCKWOOD       Elbow pain now a minimal problem now - PT was not too helpful    Shoulder pain better    After mild concussion:  - feels symptoms resolved  - sometimes not focused, though feels fine    Falls  - a few in the last year - 2-3. Lost balance for one, tripped for other        Advance Care Planning    Document on file is a Health Care Directive or POLST.        5/28/2025   General Health   How would you rate your overall physical health? Good   Feel stress (tense, anxious, or unable to sleep) Only a little   (!) STRESS CONCERN      5/28/2025   Nutrition   Diet: Regular (no restrictions)         5/28/2025   Exercise   Days per week of moderate/strenous exercise 4 days   Average minutes spent exercising at this level 30 min         5/28/2025   Social Factors    Frequency of gathering with friends or relatives More than three times a week   Worry food won't last until get money to buy more No   Food not last or not have enough money for food? No   Do you have housing? (Housing is defined as stable permanent housing and does not include staying outside in a car, in a tent, in an abandoned building, in an overnight shelter, or couch-surfing.) Yes   Are you worried about losing your housing? No   Lack of transportation? No   Unable to get utilities (heat,electricity)? No         5/28/2025   Fall Risk   Fallen 2 or more times in the past year? Yes   Trouble with walking or balance? No   Gait Speed Test Interpretation Less than or equal to 5.00 seconds - PASS           5/28/2025   Activities of Daily Living- Home Safety   Needs help with the following daily activites None of the above   Safety concerns in the home None of the above         5/28/2025   Dental   Dentist two times every year? Yes         5/28/2025   Hearing Screening   Hearing concerns? (!) I NEED TO ASK PEOPLE TO SPEAK UP OR REPEAT THEMSELVES.    Going to go to [facility] for hearing assessment         5/28/2025   Driving Risk Screening   Patient/family members have concerns about driving No         5/28/2025   General Alertness/Fatigue Screening   Have you been more tired than usual lately? No         5/28/2025   Urinary Incontinence Screening   Bothered by leaking urine in past 6 months No         Today's PHQ-2 Score:       5/28/2025     9:38 AM   PHQ-2 ( 1999 [employer])   Q1: Little interest or pleasure in doing things 0   Q2: Feeling down, depressed or hopeless 0   PHQ-2 Score 0    Q1: Little interest or pleasure in doing things Not at all   Q2: Feeling down, depressed or hopeless Not at all   PHQ-2 Score 0       Patient-reported           5/28/2025   Substance Use   Alcohol more than 3/day or more than 7/wk No   Do you have a current opioid prescription? No   How severe/bad is pain from 1 to 10? 0/10 (No Pain)   Do  "you use any other substances recreationally? No     Social History     Tobacco Use    Smoking status: Never     Passive exposure: Never    Smokeless tobacco: Never   Vaping Use    Vaping status: Never Used   Substance Use Topics    Alcohol use: Yes    Drug use: Never                 Reviewed and updated as needed this visit by Provider   Tobacco  Allergies  Meds  Problems  Med Hx  Surg Hx  Fam Hx     Sexual Activity          Current providers sharing in care for this patient include:  Patient Care Team:  La Clements MD as PCP - General (Family Medicine)  Jason Kelley MD as Assigned PCP  Raman Hoang MD as MD (Cardiovascular Disease)  Haris Saeed MD as MD (Cardiovascular Disease)    The following health maintenance items are reviewed in Epic and correct as of today:  Health Maintenance   Topic Date Due    RSV VACCINE (1 - 1-dose 75+ series) Never done    COVID-19 VACCINE (6 - 2024-25 season) 04/18/2025    INFLUENZA VACCINE (Season Ended) 09/01/2025    ANNUAL REVIEW OF HM ORDERS  03/18/2026    BMP  05/27/2026    LIPID  05/27/2026    MEDICARE ANNUAL WELLNESS VISIT  05/28/2026    FALL RISK ASSESSMENT  05/28/2026    DTAP/TDAP/TD VACCINE (5 - Td or Tdap) 08/11/2026    COLORECTAL CANCER SCREENING  08/10/2027    DIABETES SCREENING  05/27/2028    ADVANCE CARE PLANNING  05/28/2030    PHQ-2 (once per calendar year)  Completed    PNEUMOCOCCAL VACCINE 50+ YEARS  Completed    ZOSTER VACCINE  Completed    HPV VACCINE  Aged Out    MENINGITIS VACCINE  Aged Out            Objective    Exam  /84   Pulse 66   Temp (!) 96.3  F (35.7  C) (Tympanic)   Resp 16   Ht 1.74 m (5' 8.5\")   Wt 69.9 kg (154 lb 3.2 oz)   SpO2 99%   BMI 23.11 kg/m     Estimated body mass index is 23.11 kg/m  as calculated from the following:    Height as of this encounter: 1.74 m (5' 8.5\").    Weight as of this encounter: 69.9 kg (154 lb 3.2 oz).    Physical Exam  Constitutional:       General: He is not in acute " distress.     Appearance: Normal appearance. He is not ill-appearing or toxic-appearing.   HENT:      Head: Normocephalic and atraumatic.      Nose: Nose normal.   Eyes:      Conjunctiva/sclera: Conjunctivae normal.   Neck:      Thyroid: No thyroid mass, thyromegaly or thyroid tenderness.   Cardiovascular:      Rate and Rhythm: Normal rate and regular rhythm.      Pulses: Normal pulses.      Heart sounds: Normal heart sounds.   Pulmonary:      Effort: Pulmonary effort is normal.      Breath sounds: Normal breath sounds.   Skin:     General: Skin is warm and dry.   Neurological:      General: No focal deficit present.      Mental Status: He is alert. Mental status is at baseline.   Psychiatric:         Mood and Affect: Mood normal.         Behavior: Behavior normal.               5/28/2025   Mini Cog   Clock Draw Score 2 Normal   3 Item Recall 3 objects recalled   Mini Cog Total Score 5              Signed Electronically by: La Clements MD

## 2025-05-28 NOTE — PATIENT INSTRUCTIONS
Patient Education   Preventive Care Advice   This is general advice given by our system to help you stay healthy. However, your care team may have specific advice just for you. Please talk to your care team about your preventive care needs.  Nutrition  Eat 5 or more servings of fruits and vegetables each day.  Try wheat bread, brown rice and whole grain pasta (instead of white bread, rice, and pasta).  Get enough calcium and vitamin D. Check the label on foods and aim for 100% of the RDA (recommended daily allowance).  Lifestyle  Exercise at least 150 minutes each week  (30 minutes a day, 5 days a week).  Do muscle strengthening activities 2 days a week. These help control your weight and prevent disease.  No smoking.  Wear sunscreen to prevent skin cancer.  Have a dental exam and cleaning every 6 months.  Yearly exams  See your health care team every year to talk about:  Any changes in your health.  Any medicines your care team has prescribed.  Preventive care, family planning, and ways to prevent chronic diseases.  Shots (vaccines)   HPV shots (up to age 26), if you've never had them before.  Hepatitis B shots (up to age 59), if you've never had them before.  COVID-19 shot: Get this shot when it's due.  Flu shot: Get a flu shot every year.  Tetanus shot: Get a tetanus shot every 10 years.  Pneumococcal, hepatitis A, and RSV shots: Ask your care team if you need these based on your risk.  Shingles shot (for age 50 and up)  General health tests  Diabetes screening:  Starting at age 35, Get screened for diabetes at least every 3 years.  If you are younger than age 35, ask your care team if you should be screened for diabetes.  Cholesterol test: At age 39, start having a cholesterol test every 5 years, or more often if advised.  Bone density scan (DEXA): At age 50, ask your care team if you should have this scan for osteoporosis (brittle bones).  Hepatitis C: Get tested at least once in your life.  STIs (sexually  transmitted infections)  Before age 24: Ask your care team if you should be screened for STIs.  After age 24: Get screened for STIs if you're at risk. You are at risk for STIs (including HIV) if:  You are sexually active with more than one person.  You don't use condoms every time.  You or a partner was diagnosed with a sexually transmitted infection.  If you are at risk for HIV, ask about PrEP medicine to prevent HIV.  Get tested for HIV at least once in your life, whether you are at risk for HIV or not.  Cancer screening tests  Cervical cancer screening: If you have a cervix, begin getting regular cervical cancer screening tests starting at age 21.  Breast cancer scan (mammogram): If you've ever had breasts, begin having regular mammograms starting at age 40. This is a scan to check for breast cancer.  Colon cancer screening: It is important to start screening for colon cancer at age 45.  Have a colonoscopy test every 10 years (or more often if you're at risk) Or, ask your provider about stool tests like a FIT test every year or Cologuard test every 3 years.  To learn more about your testing options, visit:   .  For help making a decision, visit:   https://bit.ly/fw20308.  Prostate cancer screening test: If you have a prostate, ask your care team if a prostate cancer screening test (PSA) at age 55 is right for you.  Lung cancer screening: If you are a current or former smoker ages 50 to 80, ask your care team if ongoing lung cancer screenings are right for you.  For informational purposes only. Not to replace the advice of your health care provider. Copyright   2023 [facility]. All rights reserved. Clinically reviewed by the [facility]. [place]ewi 715002 - REV 01/24.  Preventing Falls: Care Instructions  Injuries and health problems such as trouble walking or poor eyesight can increase your risk of falling. So can some medicines. But there are things you can do to help  "prevent falls. You can exercise to get stronger. You can also arrange your home to make it safer.    Talk to your doctor about the medicines you take. Ask if any of them increase the risk of falls and whether they can be changed or stopped.   Try to exercise regularly. It can help improve your strength and balance. This can help lower your risk of falling.         Practice fall safety and prevention.   Wear low-heeled shoes that fit well and give your feet good support. Talk to your doctor if you have foot problems that make this hard.  Carry a cellphone or wear a medical alert device that you can use to call for help.  Use stepladders instead of chairs to reach high objects. Don't climb if you're at risk for falls. Ask for help, if needed.  Wear the correct eyeglasses, if you need them.        Make your home safer.   Remove rugs, cords, clutter, and furniture from walkways.  Keep your house well lit. Use night-lights in hallways and bathrooms.  Install and use sturdy handrails on stairways.  Wear nonskid footwear, even inside. Don't walk barefoot or in socks without shoes.        Be safe outside.   Use handrails, curb cuts, and ramps whenever possible.  Keep your hands free by using a shoulder bag or backpack.  Try to walk in well-lit areas. Watch out for uneven ground, changes in pavement, and debris.  Be careful in the winter. Walk on the grass or gravel when sidewalks are slippery. Use de-icer on steps and walkways. Add non-slip devices to shoes.    Put grab bars and nonskid mats in your shower or tub and near the toilet. Try to use a shower chair or bath bench when bathing.   Get into a tub or shower by putting in your weaker leg first. Get out with your strong side first. Have a phone or medical alert device in the bathroom with you.   Where can you learn more?  Go to https://www.[employer].net/patiented  Enter G117 in the search box to learn more about \"Preventing Falls: Care Instructions.\"  Current as of: " July 31, 2024  Content Version: 14.4    3689-0787 "[employer].   Care instructions adapted under license by your healthcare professional. If you have questions about a medical condition or this instruction, always ask your healthcare professional. "[employer] disclaims any warranty or liability for your use of this information.    Hearing Loss: Care Instructions  Overview     Hearing loss is a sudden or slow decrease in how well you hear. It can range from slight to profound. Permanent hearing loss can occur with aging. It also can happen when you are exposed long-term to loud noise. Examples include listening to loud music, riding motorcycles, or being around other loud machines.  Hearing loss can affect your work and home life. It can make you feel lonely or depressed. You may feel that you have lost your independence. But hearing aids and other devices can help you hear better and feel connected to others.  Follow-up care is a key part of your treatment and safety. Be sure to make and go to all appointments, and call your doctor if you are having problems. It's also a good idea to know your test results and keep a list of the medicines you take.  How can you care for yourself at home?  Avoid loud noises whenever possible. This helps keep your hearing from getting worse.  Always wear hearing protection around loud noises.  Wear a hearing aid as directed.  A professional can help you pick a hearing aid that will work best for you.  You can also get hearing aids over the counter for mild to moderate hearing loss.  Have hearing tests as your doctor suggests. They can show whether your hearing has changed. Your hearing aid may need to be adjusted.  Use other devices as needed. These may include:  Telephone amplifiers and hearing aids that can connect to a television, stereo, radio, or microphone.  Devices that use lights or vibrations. These alert you to the doorbell, a ringing telephone, or a  "baby monitor.  Television closed-captioning. This shows the words at the bottom of the screen. Most new TVs can do this.  TTY (text telephone). This lets you type messages back and forth on the telephone instead of talking or listening. These devices are also called TDD. When messages are typed on the keyboard, they are sent over the phone line to a receiving TTY. The message is shown on a monitor.  Use text messaging, social media, and email if it is hard for you to communicate by telephone.  Try to learn a listening technique called speechreading. It is not lipreading. You pay attention to people's gestures, expressions, posture, and tone of voice. These clues can help you understand what a person is saying. Face the person you are talking to, and have them face you. Make sure the lighting is good. You need to see the other person's face clearly.  Think about counseling if you need help to adjust to your hearing loss.  When should you call for help?  Watch closely for changes in your health, and be sure to contact your doctor if:    You think your hearing is getting worse.     You have new symptoms, such as dizziness or nausea.   Where can you learn more?  Go to https://www.[employer].net/patiented  Enter R798 in the search box to learn more about \"Hearing Loss: Care Instructions.\"  Current as of: October 27, 2024  Content Version: 14.4    6907-5642 [employer].   Care instructions adapted under license by your healthcare professional. If you have questions about a medical condition or this instruction, always ask your healthcare professional. [employer] disclaims any warranty or liability for your use of this information.       "

## 2025-08-25 ENCOUNTER — MYC MEDICAL ADVICE (OUTPATIENT)
Dept: FAMILY MEDICINE | Facility: CLINIC | Age: 80
End: 2025-08-25
Payer: MEDICARE